# Patient Record
Sex: MALE | Race: WHITE | NOT HISPANIC OR LATINO | Employment: OTHER | URBAN - METROPOLITAN AREA
[De-identification: names, ages, dates, MRNs, and addresses within clinical notes are randomized per-mention and may not be internally consistent; named-entity substitution may affect disease eponyms.]

---

## 2017-02-21 ENCOUNTER — ALLSCRIPTS OFFICE VISIT (OUTPATIENT)
Dept: OTHER | Facility: OTHER | Age: 62
End: 2017-02-21

## 2017-02-21 DIAGNOSIS — E78.5 HYPERLIPIDEMIA: ICD-10-CM

## 2017-02-21 DIAGNOSIS — Z12.5 ENCOUNTER FOR SCREENING FOR MALIGNANT NEOPLASM OF PROSTATE: ICD-10-CM

## 2017-02-21 DIAGNOSIS — I10 ESSENTIAL (PRIMARY) HYPERTENSION: ICD-10-CM

## 2017-02-21 DIAGNOSIS — E55.9 VITAMIN D DEFICIENCY: ICD-10-CM

## 2017-02-23 ENCOUNTER — APPOINTMENT (OUTPATIENT)
Dept: LAB | Facility: CLINIC | Age: 62
End: 2017-02-23
Payer: COMMERCIAL

## 2017-02-23 DIAGNOSIS — I10 ESSENTIAL (PRIMARY) HYPERTENSION: ICD-10-CM

## 2017-02-23 DIAGNOSIS — Z12.5 ENCOUNTER FOR SCREENING FOR MALIGNANT NEOPLASM OF PROSTATE: ICD-10-CM

## 2017-02-23 DIAGNOSIS — E55.9 VITAMIN D DEFICIENCY: ICD-10-CM

## 2017-02-23 DIAGNOSIS — E78.5 HYPERLIPIDEMIA: ICD-10-CM

## 2017-02-23 LAB
25(OH)D3 SERPL-MCNC: 26.4 NG/ML (ref 30–100)
ALBUMIN SERPL BCP-MCNC: 4.1 G/DL (ref 3.5–5)
ALP SERPL-CCNC: 80 U/L (ref 46–116)
ALT SERPL W P-5'-P-CCNC: 56 U/L (ref 12–78)
ANION GAP SERPL CALCULATED.3IONS-SCNC: 8 MMOL/L (ref 4–13)
AST SERPL W P-5'-P-CCNC: 23 U/L (ref 5–45)
BASOPHILS # BLD AUTO: 0.06 THOUSANDS/ΜL (ref 0–0.1)
BASOPHILS NFR BLD AUTO: 1 % (ref 0–1)
BILIRUB SERPL-MCNC: 1.08 MG/DL (ref 0.2–1)
BUN SERPL-MCNC: 14 MG/DL (ref 5–25)
CALCIUM SERPL-MCNC: 8.7 MG/DL (ref 8.3–10.1)
CHLORIDE SERPL-SCNC: 102 MMOL/L (ref 100–108)
CHOLEST SERPL-MCNC: 177 MG/DL (ref 50–200)
CO2 SERPL-SCNC: 29 MMOL/L (ref 21–32)
CREAT SERPL-MCNC: 1.07 MG/DL (ref 0.6–1.3)
EOSINOPHIL # BLD AUTO: 0.38 THOUSAND/ΜL (ref 0–0.61)
EOSINOPHIL NFR BLD AUTO: 5 % (ref 0–6)
ERYTHROCYTE [DISTWIDTH] IN BLOOD BY AUTOMATED COUNT: 12.5 % (ref 11.6–15.1)
GFR SERPL CREATININE-BSD FRML MDRD: >60 ML/MIN/1.73SQ M
GLUCOSE SERPL-MCNC: 131 MG/DL (ref 65–140)
HCT VFR BLD AUTO: 42.8 % (ref 36.5–49.3)
HDLC SERPL-MCNC: 52 MG/DL (ref 40–60)
HGB BLD-MCNC: 14.7 G/DL (ref 12–17)
LDLC SERPL CALC-MCNC: 99 MG/DL (ref 0–100)
LYMPHOCYTES # BLD AUTO: 2.23 THOUSANDS/ΜL (ref 0.6–4.47)
LYMPHOCYTES NFR BLD AUTO: 29 % (ref 14–44)
MCH RBC QN AUTO: 31.3 PG (ref 26.8–34.3)
MCHC RBC AUTO-ENTMCNC: 34.3 G/DL (ref 31.4–37.4)
MCV RBC AUTO: 91 FL (ref 82–98)
MONOCYTES # BLD AUTO: 0.88 THOUSAND/ΜL (ref 0.17–1.22)
MONOCYTES NFR BLD AUTO: 11 % (ref 4–12)
NEUTROPHILS # BLD AUTO: 4.27 THOUSANDS/ΜL (ref 1.85–7.62)
NEUTS SEG NFR BLD AUTO: 54 % (ref 43–75)
NRBC BLD AUTO-RTO: 0 /100 WBCS
PLATELET # BLD AUTO: 208 THOUSANDS/UL (ref 149–390)
PMV BLD AUTO: 12.1 FL (ref 8.9–12.7)
POTASSIUM SERPL-SCNC: 4.4 MMOL/L (ref 3.5–5.3)
PROT SERPL-MCNC: 7.6 G/DL (ref 6.4–8.2)
PSA SERPL-MCNC: 0.6 NG/ML (ref 0–4)
RBC # BLD AUTO: 4.7 MILLION/UL (ref 3.88–5.62)
SODIUM SERPL-SCNC: 139 MMOL/L (ref 136–145)
TRIGL SERPL-MCNC: 131 MG/DL
TSH SERPL DL<=0.05 MIU/L-ACNC: 0.99 UIU/ML (ref 0.36–3.74)
WBC # BLD AUTO: 7.82 THOUSAND/UL (ref 4.31–10.16)

## 2017-02-23 PROCEDURE — 80053 COMPREHEN METABOLIC PANEL: CPT

## 2017-02-23 PROCEDURE — 84443 ASSAY THYROID STIM HORMONE: CPT

## 2017-02-23 PROCEDURE — 85025 COMPLETE CBC W/AUTO DIFF WBC: CPT

## 2017-02-23 PROCEDURE — 36415 COLL VENOUS BLD VENIPUNCTURE: CPT

## 2017-02-23 PROCEDURE — 80061 LIPID PANEL: CPT

## 2017-02-23 PROCEDURE — G0103 PSA SCREENING: HCPCS

## 2017-02-23 PROCEDURE — 82306 VITAMIN D 25 HYDROXY: CPT

## 2017-06-26 ENCOUNTER — ALLSCRIPTS OFFICE VISIT (OUTPATIENT)
Dept: OTHER | Facility: OTHER | Age: 62
End: 2017-06-26

## 2017-10-30 ENCOUNTER — GENERIC CONVERSION - ENCOUNTER (OUTPATIENT)
Dept: OTHER | Facility: OTHER | Age: 62
End: 2017-10-30

## 2018-01-12 VITALS
TEMPERATURE: 97.8 F | BODY MASS INDEX: 28.74 KG/M2 | DIASTOLIC BLOOD PRESSURE: 72 MMHG | RESPIRATION RATE: 16 BRPM | SYSTOLIC BLOOD PRESSURE: 126 MMHG | HEART RATE: 72 BPM | WEIGHT: 183.1 LBS | HEIGHT: 67 IN

## 2018-01-14 VITALS
HEART RATE: 88 BPM | SYSTOLIC BLOOD PRESSURE: 138 MMHG | HEIGHT: 68 IN | RESPIRATION RATE: 16 BRPM | BODY MASS INDEX: 27.74 KG/M2 | DIASTOLIC BLOOD PRESSURE: 80 MMHG | WEIGHT: 183 LBS

## 2018-02-26 ENCOUNTER — TELEPHONE (OUTPATIENT)
Dept: FAMILY MEDICINE CLINIC | Facility: CLINIC | Age: 63
End: 2018-02-26

## 2018-02-26 DIAGNOSIS — I10 BENIGN ESSENTIAL HTN: ICD-10-CM

## 2018-02-26 DIAGNOSIS — Z12.5 SCREENING PSA (PROSTATE SPECIFIC ANTIGEN): ICD-10-CM

## 2018-02-26 DIAGNOSIS — E78.2 HYPERLIPEMIA, MIXED: Primary | ICD-10-CM

## 2018-02-26 RX ORDER — AMLODIPINE BESYLATE 5 MG/1
5 TABLET ORAL EVERY EVENING
Refills: 3 | COMMUNITY
Start: 2017-12-06 | End: 2018-02-26 | Stop reason: SDUPTHER

## 2018-02-26 RX ORDER — AMLODIPINE BESYLATE 5 MG/1
5 TABLET ORAL EVERY EVENING
Qty: 30 TABLET | Refills: 1 | Status: SHIPPED | OUTPATIENT
Start: 2018-02-26 | End: 2018-03-12 | Stop reason: SDUPTHER

## 2018-02-26 NOTE — TELEPHONE ENCOUNTER
Called Orin Gasca and he will  the lab slips  He set an appt for April 2nd as you are on vacation for a bit  Can you send in 1 month refills of the Amodipine and Metoprolol to the [facility] in [place]?

## 2018-02-26 NOTE — TELEPHONE ENCOUNTER
German Washington called and would like to schedule his yearly visit with you  Would you like him to have his BW first or visit first?  If Bw first, please order

## 2018-03-12 DIAGNOSIS — I10 BENIGN ESSENTIAL HTN: ICD-10-CM

## 2018-03-12 RX ORDER — AMLODIPINE BESYLATE 5 MG/1
TABLET ORAL
Qty: 90 TABLET | Refills: 3 | Status: SHIPPED | OUTPATIENT
Start: 2018-03-12 | End: 2019-05-06 | Stop reason: SDUPTHER

## 2018-03-26 ENCOUNTER — APPOINTMENT (OUTPATIENT)
Dept: LAB | Facility: CLINIC | Age: 63
End: 2018-03-26
Payer: COMMERCIAL

## 2018-03-26 LAB
ALBUMIN SERPL BCP-MCNC: 4.4 G/DL (ref 3.5–5)
ALP SERPL-CCNC: 82 U/L (ref 46–116)
ALT SERPL W P-5'-P-CCNC: 50 U/L (ref 12–78)
ANION GAP SERPL CALCULATED.3IONS-SCNC: 6 MMOL/L (ref 4–13)
AST SERPL W P-5'-P-CCNC: 25 U/L (ref 5–45)
BASOPHILS # BLD AUTO: 0.06 THOUSANDS/ΜL (ref 0–0.1)
BASOPHILS NFR BLD AUTO: 1 % (ref 0–1)
BILIRUB SERPL-MCNC: 0.92 MG/DL (ref 0.2–1)
BUN SERPL-MCNC: 18 MG/DL (ref 5–25)
CALCIUM SERPL-MCNC: 9.2 MG/DL (ref 8.3–10.1)
CHLORIDE SERPL-SCNC: 104 MMOL/L (ref 100–108)
CHOLEST SERPL-MCNC: 212 MG/DL (ref 50–200)
CO2 SERPL-SCNC: 28 MMOL/L (ref 21–32)
CREAT SERPL-MCNC: 1.21 MG/DL (ref 0.6–1.3)
EOSINOPHIL # BLD AUTO: 0.33 THOUSAND/ΜL (ref 0–0.61)
EOSINOPHIL NFR BLD AUTO: 5 % (ref 0–6)
ERYTHROCYTE [DISTWIDTH] IN BLOOD BY AUTOMATED COUNT: 13 % (ref 11.6–15.1)
GFR SERPL CREATININE-BSD FRML MDRD: 63 ML/MIN/1.73SQ M
GLUCOSE P FAST SERPL-MCNC: 138 MG/DL (ref 65–99)
HCT VFR BLD AUTO: 44.1 % (ref 36.5–49.3)
HDLC SERPL-MCNC: 49 MG/DL (ref 40–60)
HGB BLD-MCNC: 15.1 G/DL (ref 12–17)
LDLC SERPL CALC-MCNC: 132 MG/DL (ref 0–100)
LYMPHOCYTES # BLD AUTO: 2.35 THOUSANDS/ΜL (ref 0.6–4.47)
LYMPHOCYTES NFR BLD AUTO: 37 % (ref 14–44)
MCH RBC QN AUTO: 30.9 PG (ref 26.8–34.3)
MCHC RBC AUTO-ENTMCNC: 34.2 G/DL (ref 31.4–37.4)
MCV RBC AUTO: 90 FL (ref 82–98)
MONOCYTES # BLD AUTO: 0.57 THOUSAND/ΜL (ref 0.17–1.22)
MONOCYTES NFR BLD AUTO: 9 % (ref 4–12)
NEUTROPHILS # BLD AUTO: 3.01 THOUSANDS/ΜL (ref 1.85–7.62)
NEUTS SEG NFR BLD AUTO: 48 % (ref 43–75)
NRBC BLD AUTO-RTO: 0 /100 WBCS
PLATELET # BLD AUTO: 218 THOUSANDS/UL (ref 149–390)
PMV BLD AUTO: 12.2 FL (ref 8.9–12.7)
POTASSIUM SERPL-SCNC: 4.5 MMOL/L (ref 3.5–5.3)
PROT SERPL-MCNC: 8.3 G/DL (ref 6.4–8.2)
RBC # BLD AUTO: 4.88 MILLION/UL (ref 3.88–5.62)
SODIUM SERPL-SCNC: 138 MMOL/L (ref 136–145)
TRIGL SERPL-MCNC: 157 MG/DL
TSH SERPL DL<=0.05 MIU/L-ACNC: 1.65 UIU/ML (ref 0.36–3.74)
WBC # BLD AUTO: 6.34 THOUSAND/UL (ref 4.31–10.16)

## 2018-03-26 PROCEDURE — 85025 COMPLETE CBC W/AUTO DIFF WBC: CPT | Performed by: NURSE PRACTITIONER

## 2018-03-26 PROCEDURE — 84443 ASSAY THYROID STIM HORMONE: CPT | Performed by: NURSE PRACTITIONER

## 2018-03-26 PROCEDURE — 84153 ASSAY OF PSA TOTAL: CPT | Performed by: NURSE PRACTITIONER

## 2018-03-26 PROCEDURE — 80061 LIPID PANEL: CPT | Performed by: NURSE PRACTITIONER

## 2018-03-26 PROCEDURE — 84154 ASSAY OF PSA FREE: CPT | Performed by: NURSE PRACTITIONER

## 2018-03-26 PROCEDURE — 36415 COLL VENOUS BLD VENIPUNCTURE: CPT | Performed by: NURSE PRACTITIONER

## 2018-03-26 PROCEDURE — 80053 COMPREHEN METABOLIC PANEL: CPT | Performed by: NURSE PRACTITIONER

## 2018-03-27 LAB
PSA FREE MFR SERPL: 38.6 %
PSA FREE SERPL-MCNC: 0.27 NG/ML
PSA SERPL-MCNC: 0.7 NG/ML (ref 0–4)

## 2018-04-02 ENCOUNTER — OFFICE VISIT (OUTPATIENT)
Dept: FAMILY MEDICINE CLINIC | Facility: CLINIC | Age: 63
End: 2018-04-02
Payer: COMMERCIAL

## 2018-04-02 VITALS
DIASTOLIC BLOOD PRESSURE: 80 MMHG | HEART RATE: 88 BPM | SYSTOLIC BLOOD PRESSURE: 138 MMHG | BODY MASS INDEX: 28.41 KG/M2 | HEIGHT: 67 IN | RESPIRATION RATE: 12 BRPM | WEIGHT: 181 LBS

## 2018-04-02 DIAGNOSIS — I10 HYPERTENSION, UNSPECIFIED TYPE: Primary | ICD-10-CM

## 2018-04-02 DIAGNOSIS — E78.5 HYPERLIPIDEMIA, UNSPECIFIED HYPERLIPIDEMIA TYPE: ICD-10-CM

## 2018-04-02 DIAGNOSIS — E55.9 VITAMIN D DEFICIENCY: ICD-10-CM

## 2018-04-02 DIAGNOSIS — L30.9 ECZEMA, UNSPECIFIED TYPE: ICD-10-CM

## 2018-04-02 DIAGNOSIS — R73.9 ELEVATED BLOOD SUGAR: ICD-10-CM

## 2018-04-02 PROBLEM — Z00.00 HEALTHCARE MAINTENANCE: Status: ACTIVE | Noted: 2018-04-02

## 2018-04-02 PROCEDURE — 3008F BODY MASS INDEX DOCD: CPT | Performed by: NURSE PRACTITIONER

## 2018-04-02 PROCEDURE — 1036F TOBACCO NON-USER: CPT | Performed by: NURSE PRACTITIONER

## 2018-04-02 PROCEDURE — 3075F SYST BP GE 130 - 139MM HG: CPT | Performed by: NURSE PRACTITIONER

## 2018-04-02 PROCEDURE — 93000 ELECTROCARDIOGRAM COMPLETE: CPT | Performed by: NURSE PRACTITIONER

## 2018-04-02 PROCEDURE — 3079F DIAST BP 80-89 MM HG: CPT | Performed by: NURSE PRACTITIONER

## 2018-04-02 PROCEDURE — 99214 OFFICE O/P EST MOD 30 MIN: CPT | Performed by: NURSE PRACTITIONER

## 2018-04-02 RX ORDER — AMLODIPINE BESYLATE 5 MG/1
1 TABLET ORAL
COMMUNITY
Start: 2012-02-28 | End: 2018-04-02 | Stop reason: ALTCHOICE

## 2018-04-02 RX ORDER — AMINO ACIDS/MV,IRON,MIN
1 TABLET ORAL DAILY
COMMUNITY

## 2018-04-02 RX ORDER — MOMETASONE FUROATE 1 MG/G
CREAM TOPICAL DAILY
Qty: 45 G | Refills: 0 | Status: SHIPPED | OUTPATIENT
Start: 2018-04-02 | End: 2019-09-19 | Stop reason: SDUPTHER

## 2018-04-02 NOTE — PROGRESS NOTES
Chief Complaint   Patient presents with    Follow-up     Assessment/Plan:      1  Hypertension, unspecified type  This is good with the amlodipine and the metoprolol  Your ekg is normal   - POCT ECG    2  Hyperlipidemia, unspecified hyperlipidemia type  Your chol is good but did go up a bit  Please watch the diet and increase the exercise  3  Vitamin D deficiency  Please increase your Vitd to 2000 a day and wew ill brenda this in 1 year  4  Elevated blood sugar  We will brenda this in 3 months please clean up  the diet and exercise    - HEMOGLOBIN A1C W/ EAG ESTIMATION; Future  - Comprehensive metabolic panel; Future    5  Eczema, unspecified type  - mometasone (ELOCON) 0 1 % cream; Apply topically daily  Dispense: 45 g; Refill: 0   rto       rto 3 months with labs prior  Subjective:      Patient ID: Kaylee Lowe  is a 57 y o  male  Here today to brenda on several chronic issues  Has changed his diet and is eating more meat and more eggs  Has had chronic L shoulder pain and has stopped his cycling due to t he shoulder pain  Is treating this with massage and this seems to have helped  Is walking most days for 40 minutes  No other issues           The following portions of the patient's history were reviewed and updated as appropriate: allergies, current medications, past family history, past medical history, past social history, past surgical history and problem list     Past Medical History:   Diagnosis Date    Adjustment disorder     last assessed - 25Mar2014    Allergic rhinitis 02/06/2012    Cubital tunnel syndrome 04/20/2010    last assessed - 13Apr2012    Dermatitis 10/17/2011    last assessed - 13Apr2012    Electrolyte or fluid disorder 01/24/2011    Fatty liver 92/33/6881    Lichen planus 89/01/0861     Past Surgical History:   Procedure Laterality Date    CYSTOSCOPY W/ URETEROSCOPY W/ LITHOTRIPSY      MOUTH SURGERY      WISDOM TOOTH EXTRACTION       Family History   Problem Relation Age of Onset    Cancer Mother     Diabetes Mother     Arthritis Father     Heart failure Father      Congesrive heart failure    Hypertension Father     Prostate cancer Father     Substance Abuse Neg Hx     Mental illness Neg Hx      Social History   Social History     Social History    Marital status: Single     Spouse name: N/A    Number of children: N/A    Years of education: N/A     Occupational History    Not on file  Social History Main Topics    Smoking status: Never Smoker    Smokeless tobacco: Never Used    Alcohol use Yes      Comment: social alcohol use, drinks 1 glass of wine twice a week    Drug use: No    Sexual activity: Not on file     Other Topics Concern    Not on file     Social History Narrative    Drinks coffee - drinks 2 cups a day    Has  Smoke detectors    Uses safety equipment - seatbelts         Review of Systems   Constitutional: Negative  Respiratory: Negative  Cardiovascular: Negative  Skin: Negative  Neurological: Negative  Hematological: Negative            Vitals:    04/02/18 0855   BP: 138/80   BP Location: Left arm   Patient Position: Sitting   Pulse: 88   Resp: 12   Weight: 82 1 kg (181 lb)   Height: 5' 7" (1 702 m)       Objective:  Office Visit on 04/02/2018   Component Date Value Ref Range Status    OTHER 04/02/2018    Final    04/02/2018   Orders Only on 02/26/2018   Component Date Value Ref Range Status    Cholesterol 03/26/2018 212* 50 - 200 mg/dL Final    Triglycerides 03/26/2018 157* <=150 mg/dL Final    HDL, Direct 03/26/2018 49  40 - 60 mg/dL Final    LDL Calculated 03/26/2018 132* 0 - 100 mg/dL Final    WBC 03/26/2018 6 34  4 31 - 10 16 Thousand/uL Final    RBC 03/26/2018 4 88  3 88 - 5 62 Million/uL Final    Hemoglobin 03/26/2018 15 1  12 0 - 17 0 g/dL Final    Hematocrit 03/26/2018 44 1  36 5 - 49 3 % Final    MCV 03/26/2018 90  82 - 98 fL Final    MCH 03/26/2018 30 9  26 8 - 34 3 pg Final    MCHC 03/26/2018 34 2  31 4 - 37 4 g/dL Final    RDW 03/26/2018 13 0  11 6 - 15 1 % Final    MPV 03/26/2018 12 2  8 9 - 12 7 fL Final    Platelets 86/57/0714 218  149 - 390 Thousands/uL Final    nRBC 03/26/2018 0  /100 WBCs Final    Neutrophils Relative 03/26/2018 48  43 - 75 % Final    Lymphocytes Relative 03/26/2018 37  14 - 44 % Final    Monocytes Relative 03/26/2018 9  4 - 12 % Final    Eosinophils Relative 03/26/2018 5  0 - 6 % Final    Basophils Relative 03/26/2018 1  0 - 1 % Final    Neutrophils Absolute 03/26/2018 3 01  1 85 - 7 62 Thousands/µL Final    Lymphocytes Absolute 03/26/2018 2 35  0 60 - 4 47 Thousands/µL Final    Monocytes Absolute 03/26/2018 0 57  0 17 - 1 22 Thousand/µL Final    Eosinophils Absolute 03/26/2018 0 33  0 00 - 0 61 Thousand/µL Final    Basophils Absolute 03/26/2018 0 06  0 00 - 0 10 Thousands/µL Final    Sodium 03/26/2018 138  136 - 145 mmol/L Final    Potassium 03/26/2018 4 5  3 5 - 5 3 mmol/L Final    Chloride 03/26/2018 104  100 - 108 mmol/L Final    CO2 03/26/2018 28  21 - 32 mmol/L Final    Anion Gap 03/26/2018 6  4 - 13 mmol/L Final    BUN 03/26/2018 18  5 - 25 mg/dL Final    Creatinine 03/26/2018 1 21  0 60 - 1 30 mg/dL Final    Glucose, Fasting 03/26/2018 138* 65 - 99 mg/dL Final    Calcium 03/26/2018 9 2  8 3 - 10 1 mg/dL Final    AST 03/26/2018 25  5 - 45 U/L Final    ALT 03/26/2018 50  12 - 78 U/L Final    Alkaline Phosphatase 03/26/2018 82  46 - 116 U/L Final    Total Protein 03/26/2018 8 3* 6 4 - 8 2 g/dL Final    Albumin 03/26/2018 4 4  3 5 - 5 0 g/dL Final    Total Bilirubin 03/26/2018 0 92  0 20 - 1 00 mg/dL Final    eGFR 03/26/2018 63  ml/min/1 73sq m Final    Prostate Specific Antigen Total 03/26/2018 0 7  0 0 - 4 0 ng/mL Final    PSA, Free 03/26/2018 0 27  N/A ng/mL Final    PSA, Free Pct 03/26/2018 38 6  % Final    [facility] 1 650  0 358 - 3 740 uIU/mL Final   Appointment on 02/23/2017   Component Date Value Ref Range Status    Cholesterol 02/23/2017 177  50 - 200 mg/dL Final    Triglycerides 02/23/2017 131  <=150 mg/dL Final    HDL, Direct 02/23/2017 52  40 - 60 mg/dL Final    LDL Calculated 02/23/2017 99  0 - 100 mg/dL Final    WBC 02/23/2017 7 82  4 31 - 10 16 Thousand/uL Final    RBC 02/23/2017 4 70  3 88 - 5 62 Million/uL Final    Hemoglobin 02/23/2017 14 7  12 0 - 17 0 g/dL Final    Hematocrit 02/23/2017 42 8  36 5 - 49 3 % Final    MCV 02/23/2017 91  82 - 98 fL Final    MCH 02/23/2017 31 3  26 8 - 34 3 pg Final    MCHC 02/23/2017 34 3  31 4 - 37 4 g/dL Final    RDW 02/23/2017 12 5  11 6 - 15 1 % Final    MPV 02/23/2017 12 1  8 9 - 12 7 fL Final    Platelets 51/25/4758 208  149 - 390 Thousands/uL Final    nRBC 02/23/2017 0  /100 WBCs Final    Neutrophils Relative 02/23/2017 54  43 - 75 % Final    Lymphocytes Relative 02/23/2017 29  14 - 44 % Final    Monocytes Relative 02/23/2017 11  4 - 12 % Final    Eosinophils Relative 02/23/2017 5  0 - 6 % Final    Basophils Relative 02/23/2017 1  0 - 1 % Final    Neutrophils Absolute 02/23/2017 4 27  1 85 - 7 62 Thousands/µL Final    Lymphocytes Absolute 02/23/2017 2 23  0 60 - 4 47 Thousands/µL Final    Monocytes Absolute 02/23/2017 0 88  0 17 - 1 22 Thousand/µL Final    Eosinophils Absolute 02/23/2017 0 38  0 00 - 0 61 Thousand/µL Final    Basophils Absolute 02/23/2017 0 06  0 00 - 0 10 Thousands/µL Final    Sodium 02/23/2017 139  136 - 145 mmol/L Final    Potassium 02/23/2017 4 4  3 5 - 5 3 mmol/L Final    Chloride 02/23/2017 102  100 - 108 mmol/L Final    CO2 02/23/2017 29  21 - 32 mmol/L Final    Anion Gap 02/23/2017 8  4 - 13 mmol/L Final    BUN 02/23/2017 14  5 - 25 mg/dL Final    Creatinine 02/23/2017 1 07  0 60 - 1 30 mg/dL Final    Glucose 02/23/2017 131  65 - 140 mg/dL Final    Calcium 02/23/2017 8 7  8 3 - 10 1 mg/dL Final    AST 02/23/2017 23  5 - 45 U/L Final    ALT 02/23/2017 56  12 - 78 U/L Final    Alkaline Phosphatase 02/23/2017 80  46 - 116 U/L Final  Total Protein 02/23/2017 7 6  6 4 - 8 2 g/dL Final    Albumin 02/23/2017 4 1  3 5 - 5 0 g/dL Final    Total Bilirubin 02/23/2017 1 08* 0 20 - 1 00 mg/dL Final    eGFR 02/23/2017 >60 0  ml/min/1 73sq m Final    PSA 02/23/2017 0 6  0 0 - 4 0 ng/mL Final    TSH 3RD GENERATON 02/23/2017 0 992  0 358 - 3 740 uIU/mL Final    Vit D, 25-Hydroxy 02/23/2017 26 4* 30 0 - 100 0 ng/mL Final          Physical Exam   Constitutional: He appears well-developed and well-nourished  Neck: Normal range of motion  Neck supple  Pulmonary/Chest: Effort normal and breath sounds normal    Musculoskeletal: Normal range of motion  Neurological: He is alert  Skin: Skin is warm and dry  Psychiatric: He has a normal mood and affect   His behavior is normal  Judgment and thought content normal

## 2018-06-02 DIAGNOSIS — I10 BENIGN ESSENTIAL HTN: ICD-10-CM

## 2018-06-25 ENCOUNTER — APPOINTMENT (OUTPATIENT)
Dept: LAB | Facility: CLINIC | Age: 63
End: 2018-06-25
Payer: COMMERCIAL

## 2018-06-25 DIAGNOSIS — R73.9 ELEVATED BLOOD SUGAR: ICD-10-CM

## 2018-06-25 LAB
ALBUMIN SERPL BCP-MCNC: 4.4 G/DL (ref 3.5–5)
ALP SERPL-CCNC: 87 U/L (ref 46–116)
ALT SERPL W P-5'-P-CCNC: 39 U/L (ref 12–78)
ANION GAP SERPL CALCULATED.3IONS-SCNC: 7 MMOL/L (ref 4–13)
AST SERPL W P-5'-P-CCNC: 23 U/L (ref 5–45)
BILIRUB SERPL-MCNC: 1.29 MG/DL (ref 0.2–1)
BUN SERPL-MCNC: 20 MG/DL (ref 5–25)
CALCIUM SERPL-MCNC: 9 MG/DL (ref 8.3–10.1)
CHLORIDE SERPL-SCNC: 102 MMOL/L (ref 100–108)
CO2 SERPL-SCNC: 28 MMOL/L (ref 21–32)
CREAT SERPL-MCNC: 1.15 MG/DL (ref 0.6–1.3)
EST. AVERAGE GLUCOSE BLD GHB EST-MCNC: 117 MG/DL
GFR SERPL CREATININE-BSD FRML MDRD: 67 ML/MIN/1.73SQ M
GLUCOSE P FAST SERPL-MCNC: 100 MG/DL (ref 65–99)
HBA1C MFR BLD: 5.7 % (ref 4.2–6.3)
POTASSIUM SERPL-SCNC: 4.4 MMOL/L (ref 3.5–5.3)
PROT SERPL-MCNC: 7.8 G/DL (ref 6.4–8.2)
SODIUM SERPL-SCNC: 137 MMOL/L (ref 136–145)

## 2018-06-25 PROCEDURE — 83036 HEMOGLOBIN GLYCOSYLATED A1C: CPT

## 2018-06-25 PROCEDURE — 80053 COMPREHEN METABOLIC PANEL: CPT

## 2018-06-25 PROCEDURE — 36415 COLL VENOUS BLD VENIPUNCTURE: CPT

## 2018-07-05 ENCOUNTER — TELEPHONE (OUTPATIENT)
Dept: FAMILY MEDICINE CLINIC | Facility: CLINIC | Age: 63
End: 2018-07-05

## 2018-07-05 NOTE — TELEPHONE ENCOUNTER
Called and left message to inform him /[email or web address] labs are improved  Good job! RDBjr      ----- Message from Bernardo Ballesteros Dr sent at 7/2/2018  7:25 AM EDT -----  Call pt: labs are improved  Good job!

## 2018-07-05 NOTE — TELEPHONE ENCOUNTER
----- Message from Bernardo Ballesteros Dr sent at 7/2/2018  7:25 AM EDT -----  Call pt: labs are improved  Good job!

## 2018-07-05 NOTE — TELEPHONE ENCOUNTER
You left message telling the patient that his blood work had improved  He is curious as to what exactly improved, what numbers, etc     You can leave a message with the details      Best number for Noreen Anthony:  [phone]

## 2018-11-03 DIAGNOSIS — I10 BENIGN ESSENTIAL HTN: ICD-10-CM

## 2018-11-05 ENCOUNTER — TELEPHONE (OUTPATIENT)
Dept: FAMILY MEDICINE CLINIC | Facility: CLINIC | Age: 63
End: 2018-11-05

## 2018-11-05 NOTE — TELEPHONE ENCOUNTER
Pt was informed via VM to schedule an office visit      ----- Message from PAU Ballesteros sent at 11/5/2018  8:57 AM EST -----  Call pt and let him know that I got a refill request for her med's and that I did refill but she is due tracy OV   please have him schedule   Thanks

## 2018-11-05 NOTE — TELEPHONE ENCOUNTER
Patient called back  He said he only ever has to come in once a year for a physical and all his medications are renewed for 1 year  He was in for a physical in the spring  He's questioning why he would need to come in for an OV

## 2018-11-13 DIAGNOSIS — I10 BENIGN ESSENTIAL HTN: ICD-10-CM

## 2018-11-13 NOTE — TELEPHONE ENCOUNTER
Patient is booked for a yearly recheck in March, please refill his Metoprolol, as he will be running out in a few days        [facility]      [phone]

## 2019-03-12 ENCOUNTER — OFFICE VISIT (OUTPATIENT)
Dept: FAMILY MEDICINE CLINIC | Facility: CLINIC | Age: 64
End: 2019-03-12
Payer: COMMERCIAL

## 2019-03-12 VITALS
BODY MASS INDEX: 27.62 KG/M2 | WEIGHT: 176 LBS | HEIGHT: 67 IN | RESPIRATION RATE: 12 BRPM | DIASTOLIC BLOOD PRESSURE: 80 MMHG | HEART RATE: 74 BPM | SYSTOLIC BLOOD PRESSURE: 152 MMHG

## 2019-03-12 DIAGNOSIS — Z00.00 MEDICARE ANNUAL WELLNESS VISIT, INITIAL: Primary | ICD-10-CM

## 2019-03-12 DIAGNOSIS — I10 ESSENTIAL HYPERTENSION: ICD-10-CM

## 2019-03-12 DIAGNOSIS — Z23 NEED FOR VACCINATION: ICD-10-CM

## 2019-03-12 DIAGNOSIS — Z12.5 SCREENING PSA (PROSTATE SPECIFIC ANTIGEN): ICD-10-CM

## 2019-03-12 DIAGNOSIS — E78.49 OTHER HYPERLIPIDEMIA: ICD-10-CM

## 2019-03-12 PROCEDURE — G0009 ADMIN PNEUMOCOCCAL VACCINE: HCPCS

## 2019-03-12 PROCEDURE — 1170F FXNL STATUS ASSESSED: CPT | Performed by: NURSE PRACTITIONER

## 2019-03-12 PROCEDURE — 4040F PNEUMOC VAC/ADMIN/RCVD: CPT

## 2019-03-12 PROCEDURE — 90670 PCV13 VACCINE IM: CPT

## 2019-03-12 PROCEDURE — 99214 OFFICE O/P EST MOD 30 MIN: CPT | Performed by: NURSE PRACTITIONER

## 2019-03-12 PROCEDURE — 1125F AMNT PAIN NOTED PAIN PRSNT: CPT | Performed by: NURSE PRACTITIONER

## 2019-03-12 PROCEDURE — G0438 PPPS, INITIAL VISIT: HCPCS | Performed by: NURSE PRACTITIONER

## 2019-03-12 RX ORDER — HYDROCHLOROTHIAZIDE 25 MG/1
12.5 TABLET ORAL DAILY
Qty: 30 TABLET | Refills: 5 | Status: SHIPPED | OUTPATIENT
Start: 2019-03-12 | End: 2019-10-24 | Stop reason: SDUPTHER

## 2019-03-12 NOTE — PROGRESS NOTES
Chief Complaint   Patient presents with    Follow-up    Hypertension    Vitamin D Deficiency    Medicare Wellness Visit     Assessment/Plan:    1  Essential hypertension  This is not in optimal control  We will add the water pill one half in the am  Continue to monitor and we will brenda this in 6 months   - hydrochlorothiazide (HYDRODIURIL) 25 mg tablet; Take 0 5 tablets (12 5 mg total) by mouth daily  Dispense: 30 tablet; Refill: 5    2  Other hyperlipidemia  You have done a really good job with the diet   We will brenda this lipids in 6 months with all your labs  rto 6 months with labs     Subjective:      Patient ID: Sruthi Troy  is a 70 y o  male  Here today to brenda on several chronic issues  States that his BP is often elevated at the dentist as well  Has really cleaned up his diet and is no longer eating sugars  Feels great         The following portions of the patient's history were reviewed and updated as appropriate: allergies, current medications, past family history, past medical history, past social history, past surgical history and problem list     Past Medical History:   Diagnosis Date    Adjustment disorder     last assessed - 25Mar2014    Allergic rhinitis 02/06/2012    Cubital tunnel syndrome 04/20/2010    last assessed - 13Apr2012    Dermatitis 10/17/2011    last assessed - 13Apr2012    Electrolyte or fluid disorder 01/24/2011    Fatty liver 23/92/5583    Lichen planus 48/80/2778     Past Surgical History:   Procedure Laterality Date    CYSTOSCOPY W/ URETEROSCOPY W/ LITHOTRIPSY      MOUTH SURGERY      WISDOM TOOTH EXTRACTION       Family History   Problem Relation Age of Onset    Cancer Mother     Diabetes Mother     Arthritis Father     Heart failure Father         Congesrive heart failure    Hypertension Father     Prostate cancer Father     Substance Abuse Neg Hx     Mental illness Neg Hx      Social History   Social History     Socioeconomic History    Marital status: Single     Spouse name: Not on file    Number of children: Not on file    Years of education: Not on file    Highest education level: Not on file   Occupational History    Not on file   Social Needs    Financial resource strain: Not on file    Food insecurity:     Worry: Not on file     Inability: Not on file    Transportation needs:     Medical: Not on file     Non-medical: Not on file   Tobacco Use    Smoking status: Never Smoker    Smokeless tobacco: Never Used   Substance and Sexual Activity    Alcohol use: Yes     Comment: social alcohol use, drinks 1 glass of wine twice a week    Drug use: No    Sexual activity: Not on file   Lifestyle    Physical activity:     Days per week: Not on file     Minutes per session: Not on file    Stress: Not on file   Relationships    Social connections:     Talks on phone: Not on file     Gets together: Not on file     Attends Taoism service: Not on file     Active member of club or organization: Not on file     Attends meetings of clubs or organizations: Not on file     Relationship status: Not on file    Intimate partner violence:     Fear of current or ex partner: Not on file     Emotionally abused: Not on file     Physically abused: Not on file     Forced sexual activity: Not on file   Other Topics Concern    Not on file   Social History Narrative    Drinks coffee - drinks 2 cups a day    Has  Smoke detectors    Uses safety equipment - seatbelts     Review of Systems   Constitutional: Negative  Respiratory: Negative  Cardiovascular: Negative  Musculoskeletal: Negative  Skin: Negative  Neurological: Negative  Psychiatric/Behavioral: Negative  Vitals:    03/12/19 0826   BP: 152/80   BP Location: Left arm   Patient Position: Sitting   Pulse: 74   Resp: 12   Weight: 79 8 kg (176 lb)   Height: 5' 7 25" (1 708 m)       Objective:   Wt Readings from Last 3 Encounters:   03/12/19 79 8 kg (176 lb)   04/02/18 82 1 kg (181 lb) 06/26/17 83 1 kg (183 lb 1 6 oz)     BP Readings from Last 3 Encounters:   03/12/19 152/80   04/02/18 138/80   06/26/17 126/72     Pulse Readings from Last 3 Encounters:   03/12/19 74   04/02/18 88   06/26/17 72     BMI Readings from Last 3 Encounters:   03/12/19 27 36 kg/m²   04/02/18 28 35 kg/m²   06/26/17 28 68 kg/m²     Appointment on 06/25/2018   Component Date Value Ref Range Status    Hemoglobin A1C 06/25/2018 5 7  4 2 - 6 3 % Final    EAG 06/25/2018 117  mg/dl Final    Sodium 06/25/2018 137  136 - 145 mmol/L Final    Potassium 06/25/2018 4 4  3 5 - 5 3 mmol/L Final    Chloride 06/25/2018 102  100 - 108 mmol/L Final    CO2 06/25/2018 28  21 - 32 mmol/L Final    ANION GAP 06/25/2018 7  4 - 13 mmol/L Final    BUN 06/25/2018 20  5 - 25 mg/dL Final    Creatinine 06/25/2018 1 15  0 60 - 1 30 mg/dL Final    Standardized to IDMS reference method    Glucose, Fasting 06/25/2018 100* 65 - 99 mg/dL Final      Specimen collection should occur prior to Sulfasalazine administration due to the potential for falsely depressed results  Specimen collection should occur prior to Sulfapyridine administration due to the potential for falsely elevated results   Calcium 06/25/2018 9 0  8 3 - 10 1 mg/dL Final    AST 06/25/2018 23  5 - 45 U/L Final      Specimen collection should occur prior to Sulfasalazine administration due to the potential for falsely depressed results   ALT 06/25/2018 39  12 - 78 U/L Final      Specimen collection should occur prior to Sulfasalazine and/or Sulfapyridine administration due to the potential for falsely depressed results       Alkaline Phosphatase 06/25/2018 87  46 - 116 U/L Final    Total Protein 06/25/2018 7 8  6 4 - 8 2 g/dL Final    Albumin 06/25/2018 4 4  3 5 - 5 0 g/dL Final    Total Bilirubin 06/25/2018 1 29* 0 20 - 1 00 mg/dL Final    eGFR 06/25/2018 67  ml/min/1 73sq m Final   Office Visit on 04/02/2018   Component Date Value Ref Range Status    OTHER 04/02/2018 Final    04/02/2018   Orders Only on 02/26/2018   Component Date Value Ref Range Status    Cholesterol 03/26/2018 212* 50 - 200 mg/dL Final      Cholesterol:       Desirable         <200 mg/dl       Borderline         200-239 mg/dl       High              >239           Triglycerides 03/26/2018 157* <=150 mg/dL Final    Specimen collection should occur prior to N-Acetylcysteine or Metamizole administration due to the potential for falsely depressed results   HDL, Direct 03/26/2018 49  40 - 60 mg/dL Final      HDL Cholesterol:       High    >59 mg/dL       Low     <41 mg/dL    LDL Calculated 03/26/2018 132* 0 - 100 mg/dL Final      This screening LDL is a calculated result  It does not have the accuracy of the Direct Measured LDL in the monitoring of patients with hyperlipidemia and/or statin therapy  Direct Measure LDL (XEY574) must be ordered separately in these patients      WBC 03/26/2018 6 34  4 31 - 10 16 Thousand/uL Final    RBC 03/26/2018 4 88  3 88 - 5 62 Million/uL Final    Hemoglobin 03/26/2018 15 1  12 0 - 17 0 g/dL Final    Hematocrit 03/26/2018 44 1  36 5 - 49 3 % Final    MCV 03/26/2018 90  82 - 98 fL Final    MCH 03/26/2018 30 9  26 8 - 34 3 pg Final    MCHC 03/26/2018 34 2  31 4 - 37 4 g/dL Final    RDW 03/26/2018 13 0  11 6 - 15 1 % Final    MPV 03/26/2018 12 2  8 9 - 12 7 fL Final    Platelets 54/57/7936 218  149 - 390 Thousands/uL Final    nRBC 03/26/2018 0  /100 WBCs Final    Neutrophils Relative 03/26/2018 48  43 - 75 % Final    Lymphocytes Relative 03/26/2018 37  14 - 44 % Final    Monocytes Relative 03/26/2018 9  4 - 12 % Final    Eosinophils Relative 03/26/2018 5  0 - 6 % Final    Basophils Relative 03/26/2018 1  0 - 1 % Final    Neutrophils Absolute 03/26/2018 3 01  1 85 - 7 62 Thousands/µL Final    Lymphocytes Absolute 03/26/2018 2 35  0 60 - 4 47 Thousands/µL Final    Monocytes Absolute 03/26/2018 0 57  0 17 - 1 22 Thousand/µL Final    Eosinophils Absolute 03/26/2018 0 33  0 00 - 0 61 Thousand/µL Final    Basophils Absolute 03/26/2018 0 06  0 00 - 0 10 Thousands/µL Final    Sodium 03/26/2018 138  136 - 145 mmol/L Final    Potassium 03/26/2018 4 5  3 5 - 5 3 mmol/L Final    Chloride 03/26/2018 104  100 - 108 mmol/L Final    CO2 03/26/2018 28  21 - 32 mmol/L Final    ANION GAP 03/26/2018 6  4 - 13 mmol/L Final    BUN 03/26/2018 18  5 - 25 mg/dL Final    Creatinine 03/26/2018 1 21  0 60 - 1 30 mg/dL Final    Standardized to IDMS reference method    Glucose, Fasting 03/26/2018 138* 65 - 99 mg/dL Final      Specimen collection should occur prior to Sulfasalazine administration due to the potential for falsely depressed results  Specimen collection should occur prior to Sulfapyridine administration due to the potential for falsely elevated results   Calcium 03/26/2018 9 2  8 3 - 10 1 mg/dL Final    AST 03/26/2018 25  5 - 45 U/L Final      Specimen collection should occur prior to Sulfasalazine administration due to the potential for falsely depressed results   ALT 03/26/2018 50  12 - 78 U/L Final      Specimen collection should occur prior to Sulfasalazine and/or Sulfapyridine administration due to the potential for falsely depressed results   Alkaline Phosphatase 03/26/2018 82  46 - 116 U/L Final    Total Protein 03/26/2018 8 3* 6 4 - 8 2 g/dL Final    Albumin 03/26/2018 4 4  3 5 - 5 0 g/dL Final    Total Bilirubin 03/26/2018 0 92  0 20 - 1 00 mg/dL Final    eGFR 03/26/2018 63  ml/min/1 73sq m Final    Prostate Specific Antigen Total 03/26/2018 0 7  0 0 - 4 0 ng/mL Final    Roche ECLIA methodology  According to the American Urological Association, Serum PSA should  decrease and remain at undetectable levels after radical  prostatectomy  The AUA defines biochemical recurrence as an initial  PSA value 0 2 ng/mL or greater followed by a subsequent confirmatory  PSA value 0 2 ng/mL or greater    Values obtained with different assay methods or kits cannot be used  interchangeably  Results cannot be interpreted as absolute evidence  of the presence or absence of malignant disease   PSA, Free 03/26/2018 0 27  N/A ng/mL Final    Roche ECLIA methodology   PSA, Free Pct 03/26/2018 38 6  % Final    The table below lists the probability of prostate cancer for  men with non-suspicious JACKSON results and total PSA between  4 and 10 ng/mL, by patient age Timothy Rhodes, [facility],  2:1322)  % Free PSA       50-64 yr        65-75 yr                    0 00-10 00%        56%             55%                   10 01-15 00%        24%             35%                   15 01-20 00%        17%             23%                   20 01-25 00%        10%             20%                        >25 00%         5%              9%  Please note:  Jackie et al did not make specific                recommendations regarding the use of                percent free PSA for any other population                of men   [address] 03/26/2018 1 650  0 358 - 3 740 uIU/mL Final          Physical Exam   Constitutional: He is oriented to person, place, and time  He appears well-developed and well-nourished  HENT:   Right Ear: Tympanic membrane and ear canal normal    Left Ear: Tympanic membrane and ear canal normal    Neck: Normal range of motion  Neck supple  Cardiovascular: Normal rate, regular rhythm, normal heart sounds and intact distal pulses  Exam reveals no decreased pulses  Pulmonary/Chest: Effort normal and breath sounds normal  No respiratory distress  He has no wheezes  He has no rales  Musculoskeletal: Normal range of motion  Neurological: He is alert and oriented to person, place, and time  He has normal reflexes  No cranial nerve deficit  Skin: Skin is warm and dry  Psychiatric: He has a normal mood and affect   His behavior is normal  Judgment and thought content normal

## 2019-03-12 NOTE — PROGRESS NOTES
Assessment and Plan:  Medicare wellness conducted  Pt started the pneumonia imm today with [facility] and will get the pneumovax at [facility] in 6 months  Is UTD with zostavax and flu  Is utd on screenings and has advanced directives  Will bring in a copy     Problem List Items Addressed This Visit     None        Health Maintenance Due   Topic Date Due    Hepatitis C Screening  1955   Kareem Philip Medicare Annual Wellness Visit (AWV)  1955    BMI: Followup Plan  08/08/1971    INFLUENZA VACCINE  07/01/2018    Fall Risk  08/08/2018    Pneumococcal PPSV23/PCV13 65+ Years / Low and Medium Risk (1 of 2 - PCV13) 08/08/2018         HPI:  Patient Active Problem List   Diagnosis    Diverticulosis    Hyperlipidemia    Hypertension    Vitamin D deficiency    Healthcare maintenance    Elevated blood sugar     Past Medical History:   Diagnosis Date    Adjustment disorder     last assessed - [id number]    Allergic rhinitis 02/06/2012    Cubital tunnel syndrome 04/20/2010    last assessed - [id number]    Dermatitis 10/17/2011    last assessed - [id number]    Electrolyte or fluid disorder 01/24/2011    Fatty liver 93/28/2380    Lichen planus 74/31/2864     Past Surgical History:   Procedure Laterality Date    CYSTOSCOPY W/ URETEROSCOPY W/ LITHOTRIPSY      MOUTH SURGERY      WISDOM TOOTH EXTRACTION       Family History   Problem Relation Age of Onset    Cancer Mother     Diabetes Mother     Arthritis Father     Heart failure Father         Congesrive heart failure    Hypertension Father     Prostate cancer Father     Substance Abuse Neg Hx     Mental illness Neg Hx      Social History     Tobacco Use   Smoking Status Never Smoker   Smokeless Tobacco Never Used     Social History     Substance and Sexual Activity   Alcohol Use Yes    Comment: social alcohol use, drinks 1 glass of wine twice a week      Social History     Substance and Sexual Activity   Drug Use No         Current Outpatient Medications   Medication Sig Dispense Refill    amLODIPine (NORVASC) 5 mg tablet TAKE 1 TABLET BY MOUTH EVERY EVENING 90 tablet 3    Cholecalciferol (D-400) 400 units TABS Take 4 tablets by mouth daily      metoprolol tartrate (LOPRESSOR) 25 mg tablet Take 1 tablet (25 mg total) by mouth 2 (two) times a day 180 tablet 2    Misc Natural Products (SAW PALMETTO) CAPS Take 3 capsules by mouth daily      mometasone (ELOCON) 0 1 % cream Apply topically daily 45 g 0    Multiple Vitamins-Minerals (OCUVITE EXTRA) TABS Take 1 tablet by mouth daily       No current facility-administered medications for this visit  Allergies   Allergen Reactions    Shellfish Allergy Hives and GI Intolerance    Allopurinol      Other reaction(s): Reverse reaction    Aspirin Hives    Other Allergic Rhinitis, Cough and Edema    Penicillins Hives    Valsartan Edema     Immunization History   Administered Date(s) Administered    INFLUENZA 10/01/2015, 09/21/2016, 10/30/2017, 11/03/2017    Influenza Quadrivalent Preservative Free 3 years and older IM 10/01/2015    Influenza Quadrivalent, 6-35 Months IM 11/03/2017    Influenza TIV (IM) 10/19/2010, 09/06/2012, 09/16/2013, 09/21/2016    Tdap 01/24/2011    Zoster 04/01/2014       Patient Care Team:  Roddy Pandey as PCP - General (Family Medicine)    Medicare Screening Tests and Risk Assessments:  Yue Barahona is here for his Welcome to Medicare and Initial Wellness visit  Health Risk Assessment:  Patient rates overall health as excellent  Patient feels that their physical health rating is Much better  Eyesight was rated as Same  Hearing was rated as Same  Patient feels that their emotional and mental health rating is Same  Pain experienced by patient in the last 7 days has been None  Patient states that he has experienced no weight loss or gain in last 6 months  Emotional/Mental Health:  Patient has been feeling nervous/anxious      PHQ-9 Depression Screening:    Frequency of the following problems over the past two weeks:      1  Little interest or pleasure in doing things: 0 - not at all      2  Feeling down, depressed, or hopeless: 0 - not at all  PHQ-2 Score: 0          Broken Bones/Falls: Fall Risk Assessment:    In the past year, patient has experienced: No history of falling in past year          Bladder/Bowel:  Patient has not leaked urine accidently in the last six months  Patient reports no loss of bowel control  Immunizations:  Patient has had a flu vaccination within the last year  Patient has not received a pneumonia shot  Patient has received a shingles shot  Patient has received tetanus/diphtheria shot  Home Safety:  Patient does not have trouble with stairs inside or outside of their home  Patient currently reports that there are no safety hazards present in home, working smoke alarms, no working carbon monoxide detectors  Preventative Screenings:   prostate cancer screen performed, colon cancer screen completed, cholesterol screen completed, glaucoma eye exam completed,     Nutrition:  Current diet: Regular with servings of the following:    Medications:  Patient is currently taking over-the-counter supplements  Patient is able to manage medications  Lifestyle Choices:  Patient reports no tobacco use  Patient has not smoked or used tobacco in the past   Patient reports alcohol use  Patient drives a vehicle  Patient wears seat belt  Activities of Daily Living:  Can get out of bed by his or her self, able to dress self, able to make own meals, able to do own shopping, able to bathe self, can do own laundry/housekeeping, can manage own money, pay bills and track expenses    Previous Hospitalizations:  No hospitalization or ED visit in past 12 months        Advanced Directives:  Patient has decided on a power of   Patient has spoken to designated power of   Patient has completed advanced directive          Preventative Screening/Counseling: Cardiovascular:      General: Risks and Benefits Discussed and Screening Current          Diabetes:      General: Risks and Benefits Discussed and Screening Current          Colorectal Cancer:      General: Risks and Benefits Discussed and Screening Current          Prostate Cancer:      General: Risks and Benefits Discussed and Screening Current          Osteoporosis:      General: Screening Not Indicated          AAA:      General: Screening Not Indicated          Glaucoma:      General: Screening Current          HIV:      General: Screening Not Indicated          Hepatitis C:      General: Screening Not Indicated        Advanced Directives:   Patient has living will for healthcare, has durable POA for healthcare, patient has an advanced directive  Immunizations:      Influenza: Risks & Benefits Discussed and Influenza UTD This Year      Pneumococcal: Pneumococcal Due Today      Zostavax: Risks & Benefits Discussed and Zostavax Vaccine UTD            No exam data present    Physical Exam:  Review of Systems   Constitutional: Negative  Respiratory: Negative  Cardiovascular: Negative  Gastrointestinal: Negative for bowel incontinence  Musculoskeletal: Negative  Skin: Negative  Neurological: Negative  Psychiatric/Behavioral: Negative  The patient is not nervous/anxious  Vitals:    03/12/19 0826   BP: 152/80   BP Location: Left arm   Patient Position: Sitting   Pulse: 74   Resp: 12   Weight: 79 8 kg (176 lb)   Height: 5' 7 25" (1 708 m)   Body mass index is 27 36 kg/m²  Physical Exam   Constitutional: He is oriented to person, place, and time  He appears well-developed and well-nourished  Neck: Normal range of motion  Neck supple  Cardiovascular: Normal rate, regular rhythm and normal heart sounds  Pulmonary/Chest: Effort normal and breath sounds normal    Abdominal: Soft  Neurological: He is alert and oriented to person, place, and time  Skin: Skin is warm and dry  Psychiatric: He has a normal mood and affect   His behavior is normal  Judgment and thought content normal

## 2019-05-06 DIAGNOSIS — I10 BENIGN ESSENTIAL HTN: ICD-10-CM

## 2019-05-06 RX ORDER — AMLODIPINE BESYLATE 5 MG/1
TABLET ORAL
Qty: 90 TABLET | Refills: 3 | Status: SHIPPED | OUTPATIENT
Start: 2019-05-06 | End: 2020-05-11

## 2019-08-01 DIAGNOSIS — I10 BENIGN ESSENTIAL HTN: ICD-10-CM

## 2019-09-03 ENCOUNTER — TELEPHONE (OUTPATIENT)
Dept: FAMILY MEDICINE CLINIC | Facility: CLINIC | Age: 64
End: 2019-09-03

## 2019-09-12 ENCOUNTER — APPOINTMENT (OUTPATIENT)
Dept: LAB | Facility: CLINIC | Age: 64
End: 2019-09-12
Payer: COMMERCIAL

## 2019-09-12 DIAGNOSIS — Z12.5 SCREENING PSA (PROSTATE SPECIFIC ANTIGEN): ICD-10-CM

## 2019-09-12 DIAGNOSIS — E78.49 OTHER HYPERLIPIDEMIA: ICD-10-CM

## 2019-09-12 DIAGNOSIS — I10 ESSENTIAL HYPERTENSION: ICD-10-CM

## 2019-09-12 LAB
ALBUMIN SERPL BCP-MCNC: 5 G/DL (ref 3.5–5)
ALP SERPL-CCNC: 87 U/L (ref 46–116)
ALT SERPL W P-5'-P-CCNC: 37 U/L (ref 12–78)
ANION GAP SERPL CALCULATED.3IONS-SCNC: 8 MMOL/L (ref 4–13)
AST SERPL W P-5'-P-CCNC: 23 U/L (ref 5–45)
BASOPHILS # BLD AUTO: 0.04 THOUSANDS/ΜL (ref 0–0.1)
BASOPHILS NFR BLD AUTO: 1 % (ref 0–1)
BILIRUB SERPL-MCNC: 1.52 MG/DL (ref 0.2–1)
BUN SERPL-MCNC: 17 MG/DL (ref 5–25)
CALCIUM SERPL-MCNC: 9.2 MG/DL (ref 8.3–10.1)
CHLORIDE SERPL-SCNC: 103 MMOL/L (ref 100–108)
CHOLEST SERPL-MCNC: 197 MG/DL (ref 50–200)
CO2 SERPL-SCNC: 27 MMOL/L (ref 21–32)
CREAT SERPL-MCNC: 1.07 MG/DL (ref 0.6–1.3)
EOSINOPHIL # BLD AUTO: 0.21 THOUSAND/ΜL (ref 0–0.61)
EOSINOPHIL NFR BLD AUTO: 5 % (ref 0–6)
ERYTHROCYTE [DISTWIDTH] IN BLOOD BY AUTOMATED COUNT: 12.4 % (ref 11.6–15.1)
GFR SERPL CREATININE-BSD FRML MDRD: 72 ML/MIN/1.73SQ M
GLUCOSE P FAST SERPL-MCNC: 118 MG/DL (ref 65–99)
HCT VFR BLD AUTO: 44.2 % (ref 36.5–49.3)
HDLC SERPL-MCNC: 50 MG/DL (ref 40–60)
HGB BLD-MCNC: 14.6 G/DL (ref 12–17)
IMM GRANULOCYTES # BLD AUTO: 0.01 THOUSAND/UL (ref 0–0.2)
IMM GRANULOCYTES NFR BLD AUTO: 0 % (ref 0–2)
LDLC SERPL CALC-MCNC: 122 MG/DL (ref 0–100)
LYMPHOCYTES # BLD AUTO: 1.59 THOUSANDS/ΜL (ref 0.6–4.47)
LYMPHOCYTES NFR BLD AUTO: 34 % (ref 14–44)
MCH RBC QN AUTO: 30.5 PG (ref 26.8–34.3)
MCHC RBC AUTO-ENTMCNC: 33 G/DL (ref 31.4–37.4)
MCV RBC AUTO: 92 FL (ref 82–98)
MONOCYTES # BLD AUTO: 0.59 THOUSAND/ΜL (ref 0.17–1.22)
MONOCYTES NFR BLD AUTO: 13 % (ref 4–12)
NEUTROPHILS # BLD AUTO: 2.2 THOUSANDS/ΜL (ref 1.85–7.62)
NEUTS SEG NFR BLD AUTO: 47 % (ref 43–75)
NONHDLC SERPL-MCNC: 147 MG/DL
NRBC BLD AUTO-RTO: 0 /100 WBCS
PLATELET # BLD AUTO: 195 THOUSANDS/UL (ref 149–390)
PMV BLD AUTO: 11.6 FL (ref 8.9–12.7)
POTASSIUM SERPL-SCNC: 4.4 MMOL/L (ref 3.5–5.3)
PROT SERPL-MCNC: 7.9 G/DL (ref 6.4–8.2)
RBC # BLD AUTO: 4.79 MILLION/UL (ref 3.88–5.62)
SODIUM SERPL-SCNC: 138 MMOL/L (ref 136–145)
TRIGL SERPL-MCNC: 124 MG/DL
TSH SERPL DL<=0.05 MIU/L-ACNC: 1.13 UIU/ML (ref 0.36–3.74)
WBC # BLD AUTO: 4.64 THOUSAND/UL (ref 4.31–10.16)

## 2019-09-12 PROCEDURE — 85025 COMPLETE CBC W/AUTO DIFF WBC: CPT

## 2019-09-12 PROCEDURE — 84153 ASSAY OF PSA TOTAL: CPT

## 2019-09-12 PROCEDURE — 80061 LIPID PANEL: CPT

## 2019-09-12 PROCEDURE — 80053 COMPREHEN METABOLIC PANEL: CPT

## 2019-09-12 PROCEDURE — 84443 ASSAY THYROID STIM HORMONE: CPT

## 2019-09-12 PROCEDURE — 36415 COLL VENOUS BLD VENIPUNCTURE: CPT

## 2019-09-12 PROCEDURE — 84154 ASSAY OF PSA FREE: CPT

## 2019-09-13 LAB
PSA FREE MFR SERPL: 40 %
PSA FREE SERPL-MCNC: 0.44 NG/ML
PSA SERPL-MCNC: 1.1 NG/ML (ref 0–4)

## 2019-09-19 ENCOUNTER — OFFICE VISIT (OUTPATIENT)
Dept: FAMILY MEDICINE CLINIC | Facility: CLINIC | Age: 64
End: 2019-09-19
Payer: COMMERCIAL

## 2019-09-19 VITALS
DIASTOLIC BLOOD PRESSURE: 72 MMHG | RESPIRATION RATE: 12 BRPM | SYSTOLIC BLOOD PRESSURE: 138 MMHG | HEIGHT: 63 IN | HEART RATE: 72 BPM | BODY MASS INDEX: 30.48 KG/M2 | WEIGHT: 172 LBS

## 2019-09-19 DIAGNOSIS — E78.49 OTHER HYPERLIPIDEMIA: ICD-10-CM

## 2019-09-19 DIAGNOSIS — L30.9 ECZEMA, UNSPECIFIED TYPE: ICD-10-CM

## 2019-09-19 DIAGNOSIS — Z23 NEED FOR VACCINATION: ICD-10-CM

## 2019-09-19 DIAGNOSIS — I10 ESSENTIAL HYPERTENSION: Primary | ICD-10-CM

## 2019-09-19 PROCEDURE — G0008 ADMIN INFLUENZA VIRUS VAC: HCPCS

## 2019-09-19 PROCEDURE — 90662 IIV NO PRSV INCREASED AG IM: CPT

## 2019-09-19 PROCEDURE — 93000 ELECTROCARDIOGRAM COMPLETE: CPT | Performed by: NURSE PRACTITIONER

## 2019-09-19 PROCEDURE — 99214 OFFICE O/P EST MOD 30 MIN: CPT | Performed by: NURSE PRACTITIONER

## 2019-09-19 RX ORDER — MOMETASONE FUROATE 1 MG/G
CREAM TOPICAL DAILY
Qty: 45 G | Refills: 0 | Status: SHIPPED | OUTPATIENT
Start: 2019-09-19 | End: 2020-06-11 | Stop reason: ALTCHOICE

## 2019-09-19 NOTE — PROGRESS NOTES
Chief Complaint   Patient presents with    Hypertension    Hyperlipidemia    Vitamin D Deficiency     Assessment/Plan:    1  Essential hypertension  This is good with the amlodipine and the metoprolol and the HCTZ     2  Need for vaccination  Flu shot given   - influenza vaccine, 3212-7633, high-dose, PF 0 5 mL (FLUZONE HIGH-DOSE)    3  BMI 30 0-30 9,adult  Please watch your diet and increase exercise as we discussed  Please let us know if there is anythign we can do to help you     4  Eczema, unspecified type  Refill of the mometasone for occasional use  - mometasone (ELOCON) 0 1 % cream; Apply topically daily  Dispense: 45 g; Refill: 0    5  Other hyperlipidemia  This is improved with lifestyle  Continue same and we will brenda in 6 months      rto 6 months to brenda         Subjective:      Patient ID: Gleda Eisenmenger  is a 75 y o  male  Here today  To brenda on several chronic issues  Has been doing a lot of aerobic exercise and has noted that his BP is improved as well as his weight  Is feeling great   Has been watching his diet  Does not check his BP when he is not here        The following portions of the patient's history were reviewed and updated as appropriate: allergies, current medications, past family history, past medical history, past social history, past surgical history and problem list     Past Medical History:   Diagnosis Date    Adjustment disorder     last assessed - 25Mar2014    Allergic rhinitis 02/06/2012    Cubital tunnel syndrome 04/20/2010    last assessed - 13Apr2012    Dermatitis 10/17/2011    last assessed - 13Apr2012    Electrolyte or fluid disorder 01/24/2011    Fatty liver 12/50/7589    Lichen planus 81/06/4825     Past Surgical History:   Procedure Laterality Date    CYSTOSCOPY W/ URETEROSCOPY W/ LITHOTRIPSY      MOUTH SURGERY      WISDOM TOOTH EXTRACTION       Family History   Problem Relation Age of Onset    Cancer Mother     Diabetes Mother     Arthritis Father     Heart failure Father         Congesrive heart failure    Hypertension Father     Prostate cancer Father     Substance Abuse Neg Hx     Mental illness Neg Hx      Social History   Social History     Socioeconomic History    Marital status: Single     Spouse name: Not on file    Number of children: Not on file    Years of education: Not on file    Highest education level: Not on file   Occupational History    Not on file   Social Needs    Financial resource strain: Not on file    Food insecurity:     Worry: Not on file     Inability: Not on file    Transportation needs:     Medical: Not on file     Non-medical: Not on file   Tobacco Use    Smoking status: Never Smoker    Smokeless tobacco: Never Used   Substance and Sexual Activity    Alcohol use: Yes     Comment: social alcohol use, drinks 1 glass of wine twice a week    Drug use: No    Sexual activity: Not on file   Lifestyle    Physical activity:     Days per week: Not on file     Minutes per session: Not on file    Stress: Not on file   Relationships    Social connections:     Talks on phone: Not on file     Gets together: Not on file     Attends Mosque service: Not on file     Active member of club or organization: Not on file     Attends meetings of clubs or organizations: Not on file     Relationship status: Not on file    Intimate partner violence:     Fear of current or ex partner: Not on file     Emotionally abused: Not on file     Physically abused: Not on file     Forced sexual activity: Not on file   Other Topics Concern    Not on file   Social History Narrative    Drinks coffee - drinks 2 cups a day    Has  Smoke detectors    Uses safety equipment - seatbelts     Review of Systems   Constitutional: Negative  Respiratory: Negative  Cardiovascular: Negative  Musculoskeletal: Negative  Skin: Negative  Neurological: Negative  Psychiatric/Behavioral: Negative            Vitals:    09/19/19 0753   BP: 138/72   BP Location: Left arm   Patient Position: Sitting   Pulse: 72   Resp: 12   Weight: 78 kg (172 lb)   Height: 5' 2 5" (1 588 m)       Objective: Wt Readings from Last 3 Encounters:   09/19/19 78 kg (172 lb)   03/12/19 79 8 kg (176 lb)   04/02/18 82 1 kg (181 lb)     BP Readings from Last 3 Encounters:   09/19/19 138/72   03/12/19 152/80   04/02/18 138/80     Pulse Readings from Last 3 Encounters:   09/19/19 72   03/12/19 74   04/02/18 88     BMI Readings from Last 3 Encounters:   09/19/19 30 96 kg/m²   03/12/19 27 36 kg/m²   04/02/18 28 35 kg/m²     Office Visit on 09/19/2019   Component Date Value Ref Range Status    OTHER 09/19/2019    Final    9/19/2019   Appointment on 09/12/2019   Component Date Value Ref Range Status    Cholesterol 09/12/2019 197  50 - 200 mg/dL Final      Cholesterol:       Desirable         <200 mg/dl       Borderline         200-239 mg/dl       High              >239           Triglycerides 09/12/2019 124  <=150 mg/dL Final      Triglyceride:     Normal          <150 mg/dl     Borderline High 150-199 mg/dl     High            200-499 mg/dl        Very High       >499 mg/dl    Specimen collection should occur prior to N-Acetylcysteine or Metamizole administration due to the potential for falsely depressed results   HDL, Direct 09/12/2019 50  40 - 60 mg/dL Final      HDL Cholesterol:       High    >60 mg/dL       Low     <41 mg/dL  Specimen collection should occur prior to Metamizole administration due to the potential for falsley depressed results   LDL Calculated 09/12/2019 122* 0 - 100 mg/dL Final      LDL Cholesterol:     Optimal           <100 mg/dl     Near Optimal      100-129 mg/dl     Above Optimal       Borderline High 130-159 mg/dl       High            160-189 mg/dl       Very High       >189 mg/dl         This screening LDL is a calculated result     It does not have the accuracy of the Direct Measured LDL in the monitoring of patients with hyperlipidemia and/or statin therapy  Direct Measure LDL (JBI933) must be ordered separately in these patients      Non-HDL-Chol (CHOL-HDL) 09/12/2019 147  mg/dl Final    WBC 09/12/2019 4 64  4 31 - 10 16 Thousand/uL Final    RBC 09/12/2019 4 79  3 88 - 5 62 Million/uL Final    Hemoglobin 09/12/2019 14 6  12 0 - 17 0 g/dL Final    Hematocrit 09/12/2019 44 2  36 5 - 49 3 % Final    MCV 09/12/2019 92  82 - 98 fL Final    MCH 09/12/2019 30 5  26 8 - 34 3 pg Final    MCHC 09/12/2019 33 0  31 4 - 37 4 g/dL Final    RDW 09/12/2019 12 4  11 6 - 15 1 % Final    MPV 09/12/2019 11 6  8 9 - 12 7 fL Final    Platelets 56/30/2310 195  149 - 390 Thousands/uL Final    nRBC 09/12/2019 0  /100 WBCs Final    Neutrophils Relative 09/12/2019 47  43 - 75 % Final    Immat GRANS % 09/12/2019 0  0 - 2 % Final    Lymphocytes Relative 09/12/2019 34  14 - 44 % Final    Monocytes Relative 09/12/2019 13* 4 - 12 % Final    Eosinophils Relative 09/12/2019 5  0 - 6 % Final    Basophils Relative 09/12/2019 1  0 - 1 % Final    Neutrophils Absolute 09/12/2019 2 20  1 85 - 7 62 Thousands/µL Final    Immature Grans Absolute 09/12/2019 0 01  0 00 - 0 20 Thousand/uL Final    Lymphocytes Absolute 09/12/2019 1 59  0 60 - 4 47 Thousands/µL Final    Monocytes Absolute 09/12/2019 0 59  0 17 - 1 22 Thousand/µL Final    Eosinophils Absolute 09/12/2019 0 21  0 00 - 0 61 Thousand/µL Final    Basophils Absolute 09/12/2019 0 04  0 00 - 0 10 Thousands/µL Final    Sodium 09/12/2019 138  136 - 145 mmol/L Final    Potassium 09/12/2019 4 4  3 5 - 5 3 mmol/L Final    Chloride 09/12/2019 103  100 - 108 mmol/L Final    CO2 09/12/2019 27  21 - 32 mmol/L Final    ANION GAP 09/12/2019 8  4 - 13 mmol/L Final    BUN 09/12/2019 17  5 - 25 mg/dL Final    Creatinine 09/12/2019 1 07  0 60 - 1 30 mg/dL Final    Standardized to IDMS reference method    Glucose, Fasting 09/12/2019 118* 65 - 99 mg/dL Final      Specimen collection should occur prior to Sulfasalazine administration due to the potential for falsely depressed results  Specimen collection should occur prior to Sulfapyridine administration due to the potential for falsely elevated results   Calcium 09/12/2019 9 2  8 3 - 10 1 mg/dL Final    AST 09/12/2019 23  5 - 45 U/L Final      Specimen collection should occur prior to Sulfasalazine administration due to the potential for falsely depressed results   ALT 09/12/2019 37  12 - 78 U/L Final      Specimen collection should occur prior to Sulfasalazine and/or Sulfapyridine administration due to the potential for falsely depressed results   Alkaline Phosphatase 09/12/2019 87  46 - 116 U/L Final    Total Protein 09/12/2019 7 9  6 4 - 8 2 g/dL Final    Albumin 09/12/2019 5 0  3 5 - 5 0 g/dL Final    Total Bilirubin 09/12/2019 1 52* 0 20 - 1 00 mg/dL Final    eGFR 09/12/2019 72  ml/min/1 73sq m Final    TSH 3RD GENERATON 09/12/2019 1 130  0 358 - 3 740 uIU/mL Final      Using supplements with high doses of biotin 20 to more than 300 times greater than the adequate daily intake for adults of 30 mcg/day as established by the [place] [employer], can cause falsely depress results   Prostate Specific Antigen Total 09/12/2019 1 1  0 0 - 4 0 ng/mL Final    Roche ECLIA methodology  According to the American Urological Association, Serum PSA should  decrease and remain at undetectable levels after radical  prostatectomy  The AUA defines biochemical recurrence as an initial  PSA value 0 2 ng/mL or greater followed by a subsequent confirmatory  PSA value 0 2 ng/mL or greater  Values obtained with different assay methods or kits cannot be used  interchangeably  Results cannot be interpreted as absolute evidence  of the presence or absence of malignant disease   PSA, Free 09/12/2019 0 44  N/A ng/mL Final    Roche ECLIA methodology      PSA, Free Pct 09/12/2019 40 0  % Final    The table below lists the probability of prostate cancer for  men with non-suspicious JACKSON results and total PSA between  4 and 10 ng/mL, by patient age Zachary Major, [address] [facility],  [phone])  % Free PSA       50-64 yr        65-75 yr                    0 00-10 00%        56%             55%                   10 01-15 00%        24%             35%                   15 01-20 00%        17%             23%                   20 01-25 00%        10%             20%                        >25 00%         5%              9%  Please note:  Jackie et al did not make specific                recommendations regarding the use of                percent free PSA for any other population                of men  Appointment on 06/25/2018   Component Date Value Ref Range Status    Hemoglobin A1C 06/25/2018 5 7  4 2 - 6 3 % Final    EAG 06/25/2018 117  mg/dl Final    Sodium 06/25/2018 137  136 - 145 mmol/L Final    Potassium 06/25/2018 4 4  3 5 - 5 3 mmol/L Final    Chloride 06/25/2018 102  100 - 108 mmol/L Final    CO2 06/25/2018 28  21 - 32 mmol/L Final    ANION GAP 06/25/2018 7  4 - 13 mmol/L Final    BUN 06/25/2018 20  5 - 25 mg/dL Final    Creatinine 06/25/2018 1 15  0 60 - 1 30 mg/dL Final    Standardized to IDMS reference method    Glucose, Fasting 06/25/2018 100* 65 - 99 mg/dL Final      Specimen collection should occur prior to Sulfasalazine administration due to the potential for falsely depressed results  Specimen collection should occur prior to Sulfapyridine administration due to the potential for falsely elevated results   Calcium 06/25/2018 9 0  8 3 - 10 1 mg/dL Final    AST 06/25/2018 23  5 - 45 U/L Final      Specimen collection should occur prior to Sulfasalazine administration due to the potential for falsely depressed results   ALT 06/25/2018 39  12 - 78 U/L Final      Specimen collection should occur prior to Sulfasalazine and/or Sulfapyridine administration due to the potential for falsely depressed results       Alkaline Phosphatase 06/25/2018 87  46 - 116 U/L Final    Total Protein 06/25/2018 7 8  6 4 - 8 2 g/dL Final    Albumin 06/25/2018 4 4  3 5 - 5 0 g/dL Final    Total Bilirubin 06/25/2018 1 29* 0 20 - 1 00 mg/dL Final    eGFR 06/25/2018 67  ml/min/1 73sq m Final   Office Visit on 04/02/2018   Component Date Value Ref Range Status    OTHER 04/02/2018    Final    04/02/2018   Orders Only on 02/26/2018   Component Date Value Ref Range Status    Cholesterol 03/26/2018 212* 50 - 200 mg/dL Final      Cholesterol:       Desirable         <200 mg/dl       Borderline         200-239 mg/dl       High              >239           Triglycerides 03/26/2018 157* <=150 mg/dL Final    Specimen collection should occur prior to N-Acetylcysteine or Metamizole administration due to the potential for falsely depressed results   HDL, Direct 03/26/2018 49  40 - 60 mg/dL Final      HDL Cholesterol:       High    >59 mg/dL       Low     <41 mg/dL    LDL Calculated 03/26/2018 132* 0 - 100 mg/dL Final      This screening LDL is a calculated result  It does not have the accuracy of the Direct Measured LDL in the monitoring of patients with hyperlipidemia and/or statin therapy  Direct Measure LDL ([id number]) must be ordered separately in these patients      WBC 03/26/2018 6 34  4 31 - 10 16 Thousand/uL Final    RBC 03/26/2018 4 88  3 88 - 5 62 Million/uL Final    Hemoglobin 03/26/2018 15 1  12 0 - 17 0 g/dL Final    Hematocrit 03/26/2018 44 1  36 5 - 49 3 % Final    MCV 03/26/2018 90  82 - 98 fL Final    MCH 03/26/2018 30 9  26 8 - 34 3 pg Final    MCHC 03/26/2018 34 2  31 4 - 37 4 g/dL Final    RDW 03/26/2018 13 0  11 6 - 15 1 % Final    MPV 03/26/2018 12 2  8 9 - 12 7 fL Final    Platelets 04/54/2651 218  149 - 390 Thousands/uL Final    nRBC 03/26/2018 0  /100 WBCs Final    Neutrophils Relative 03/26/2018 48  43 - 75 % Final    Lymphocytes Relative 03/26/2018 37  14 - 44 % Final    Monocytes Relative 03/26/2018 9  4 - 12 % Final    Eosinophils Relative 03/26/2018 5  0 - 6 % Final    Basophils Relative 03/26/2018 1  0 - 1 % Final    Neutrophils Absolute 03/26/2018 3 01  1 85 - 7 62 Thousands/µL Final    Lymphocytes Absolute 03/26/2018 2 35  0 60 - 4 47 Thousands/µL Final    Monocytes Absolute 03/26/2018 0 57  0 17 - 1 22 Thousand/µL Final    Eosinophils Absolute 03/26/2018 0 33  0 00 - 0 61 Thousand/µL Final    Basophils Absolute 03/26/2018 0 06  0 00 - 0 10 Thousands/µL Final    Sodium 03/26/2018 138  136 - 145 mmol/L Final    Potassium 03/26/2018 4 5  3 5 - 5 3 mmol/L Final    Chloride 03/26/2018 104  100 - 108 mmol/L Final    CO2 03/26/2018 28  21 - 32 mmol/L Final    ANION GAP 03/26/2018 6  4 - 13 mmol/L Final    BUN 03/26/2018 18  5 - 25 mg/dL Final    Creatinine 03/26/2018 1 21  0 60 - 1 30 mg/dL Final    Standardized to IDMS reference method    Glucose, Fasting 03/26/2018 138* 65 - 99 mg/dL Final      Specimen collection should occur prior to Sulfasalazine administration due to the potential for falsely depressed results  Specimen collection should occur prior to Sulfapyridine administration due to the potential for falsely elevated results   Calcium 03/26/2018 9 2  8 3 - 10 1 mg/dL Final    AST 03/26/2018 25  5 - 45 U/L Final      Specimen collection should occur prior to Sulfasalazine administration due to the potential for falsely depressed results   ALT 03/26/2018 50  12 - 78 U/L Final      Specimen collection should occur prior to Sulfasalazine and/or Sulfapyridine administration due to the potential for falsely depressed results   Alkaline Phosphatase 03/26/2018 82  46 - 116 U/L Final    Total Protein 03/26/2018 8 3* 6 4 - 8 2 g/dL Final    Albumin 03/26/2018 4 4  3 5 - 5 0 g/dL Final    Total Bilirubin 03/26/2018 0 92  0 20 - 1 00 mg/dL Final    eGFR 03/26/2018 63  ml/min/1 73sq m Final    Prostate Specific Antigen Total 03/26/2018 0 7  0 0 - 4 0 ng/mL Final    Roche ECLIA methodology    According to the American Urological Association, Serum PSA should  decrease and remain at undetectable levels after radical  prostatectomy  The AUA defines biochemical recurrence as an initial  PSA value 0 2 ng/mL or greater followed by a subsequent confirmatory  PSA value 0 2 ng/mL or greater  Values obtained with different assay methods or kits cannot be used  interchangeably  Results cannot be interpreted as absolute evidence  of the presence or absence of malignant disease   PSA, Free 03/26/2018 0 27  N/A ng/mL Final    Roche ECLIA methodology   PSA, Free Pct 03/26/2018 38 6  % Final    The table below lists the probability of prostate cancer for  men with non-suspicious JACKSON results and total PSA between  4 and 10 ng/mL, by patient age Ousmane Eli, [address] [place] [address],  [phone])  % Free PSA       50-64 yr        65-75 yr                    0 00-10 00%        56%             55%                   10 01-15 00%        24%             35%                   15 01-20 00%        17%             23%                   20 01-25 00%        10%             20%                        >25 00%         5%              9%  Please note:  Jackie et al did not make specific                recommendations regarding the use of                percent free PSA for any other population                of men   [address] 03/26/2018 1 650  0 358 - 3 740 uIU/mL Final          Physical Exam   Constitutional: He is oriented to person, place, and time  He appears well-developed and well-nourished  HENT:   Right Ear: Tympanic membrane and ear canal normal    Left Ear: Tympanic membrane and ear canal normal    Neck: Normal range of motion  Neck supple  Cardiovascular: Normal rate, regular rhythm, normal heart sounds and intact distal pulses  Exam reveals no decreased pulses  Pulmonary/Chest: Effort normal and breath sounds normal  No respiratory distress  He has no wheezes  He has no rales     Musculoskeletal: Normal range of motion  Neurological: He is alert and oriented to person, place, and time  He has normal reflexes  No cranial nerve deficit  Skin: Skin is warm and dry  Psychiatric: He has a normal mood and affect  His behavior is normal  Judgment and thought content normal        BMI Counseling: Body mass index is 30 96 kg/m²  The BMI is above normal  Nutrition recommendations include reducing portion sizes, decreasing overall calorie intake and 3-5 servings of fruits/vegetables daily  Exercise recommendations include moderate aerobic physical activity for 150 minutes/week

## 2019-10-24 DIAGNOSIS — I10 ESSENTIAL HYPERTENSION: ICD-10-CM

## 2019-10-24 RX ORDER — HYDROCHLOROTHIAZIDE 12.5 MG/1
12.5 TABLET ORAL DAILY
Qty: 90 TABLET | Refills: 3 | Status: SHIPPED | OUTPATIENT
Start: 2019-10-24 | End: 2020-12-21

## 2019-10-24 RX ORDER — HYDROCHLOROTHIAZIDE 25 MG/1
TABLET ORAL
Qty: 45 TABLET | Refills: 3 | Status: SHIPPED | OUTPATIENT
Start: 2019-10-24 | End: 2019-10-24 | Stop reason: SDUPTHER

## 2019-12-27 ENCOUNTER — TELEPHONE (OUTPATIENT)
Dept: GASTROENTEROLOGY | Facility: CLINIC | Age: 64
End: 2019-12-27

## 2020-02-12 NOTE — TELEPHONE ENCOUNTER
Dr Jackson Seen has been contacted to schedule recall colon with no response-please advise   Thank you

## 2020-02-13 NOTE — TELEPHONE ENCOUNTER
Patient had difficulty with the colonoscopy in 2015 - had fair prep in 2015 and had some wretching during exam - recall was for 5 years given the limitations of the prep - have patient get a colo- gurard study - we can order - if he has questions then let me know and I can call him thanks - can use this for sceeening and repeat in 3 years

## 2020-03-16 ENCOUNTER — APPOINTMENT (OUTPATIENT)
Dept: LAB | Facility: CLINIC | Age: 65
End: 2020-03-16
Payer: COMMERCIAL

## 2020-03-16 DIAGNOSIS — I10 ESSENTIAL HYPERTENSION: ICD-10-CM

## 2020-03-16 LAB
ALBUMIN SERPL BCP-MCNC: 4.3 G/DL (ref 3.5–5)
ALP SERPL-CCNC: 92 U/L (ref 46–116)
ALT SERPL W P-5'-P-CCNC: 37 U/L (ref 12–78)
ANION GAP SERPL CALCULATED.3IONS-SCNC: 6 MMOL/L (ref 4–13)
AST SERPL W P-5'-P-CCNC: 22 U/L (ref 5–45)
BILIRUB SERPL-MCNC: 1 MG/DL (ref 0.2–1)
BUN SERPL-MCNC: 16 MG/DL (ref 5–25)
CALCIUM SERPL-MCNC: 9.1 MG/DL (ref 8.3–10.1)
CHLORIDE SERPL-SCNC: 105 MMOL/L (ref 100–108)
CO2 SERPL-SCNC: 28 MMOL/L (ref 21–32)
CREAT SERPL-MCNC: 1.01 MG/DL (ref 0.6–1.3)
GFR SERPL CREATININE-BSD FRML MDRD: 77 ML/MIN/1.73SQ M
GLUCOSE P FAST SERPL-MCNC: 130 MG/DL (ref 65–99)
POTASSIUM SERPL-SCNC: 4.1 MMOL/L (ref 3.5–5.3)
PROT SERPL-MCNC: 8 G/DL (ref 6.4–8.2)
SODIUM SERPL-SCNC: 139 MMOL/L (ref 136–145)

## 2020-03-16 PROCEDURE — 36415 COLL VENOUS BLD VENIPUNCTURE: CPT

## 2020-03-16 PROCEDURE — 80053 COMPREHEN METABOLIC PANEL: CPT

## 2020-04-20 ENCOUNTER — TELEPHONE (OUTPATIENT)
Dept: FAMILY MEDICINE CLINIC | Facility: CLINIC | Age: 65
End: 2020-04-20

## 2020-04-20 ENCOUNTER — TELEMEDICINE (OUTPATIENT)
Dept: FAMILY MEDICINE CLINIC | Facility: CLINIC | Age: 65
End: 2020-04-20
Payer: COMMERCIAL

## 2020-04-20 VITALS — HEIGHT: 63 IN | WEIGHT: 173.2 LBS | BODY MASS INDEX: 30.69 KG/M2 | TEMPERATURE: 96.7 F

## 2020-04-20 DIAGNOSIS — R73.9 ELEVATED BLOOD SUGAR: ICD-10-CM

## 2020-04-20 DIAGNOSIS — R22.0 LIP SWELLING: ICD-10-CM

## 2020-04-20 DIAGNOSIS — I10 ESSENTIAL HYPERTENSION: ICD-10-CM

## 2020-04-20 DIAGNOSIS — E78.49 OTHER HYPERLIPIDEMIA: ICD-10-CM

## 2020-04-20 DIAGNOSIS — Z12.11 ENCOUNTER FOR SCREENING COLONOSCOPY: Primary | ICD-10-CM

## 2020-04-20 PROCEDURE — G2012 BRIEF CHECK IN BY MD/QHP: HCPCS | Performed by: NURSE PRACTITIONER

## 2020-05-09 DIAGNOSIS — I10 BENIGN ESSENTIAL HTN: ICD-10-CM

## 2020-05-11 RX ORDER — AMLODIPINE BESYLATE 5 MG/1
TABLET ORAL
Qty: 90 TABLET | Refills: 3 | Status: SHIPPED | OUTPATIENT
Start: 2020-05-11 | End: 2020-08-13

## 2020-06-03 ENCOUNTER — APPOINTMENT (OUTPATIENT)
Dept: LAB | Facility: HOSPITAL | Age: 65
End: 2020-06-03
Payer: COMMERCIAL

## 2020-06-03 DIAGNOSIS — R73.9 ELEVATED BLOOD SUGAR: ICD-10-CM

## 2020-06-03 DIAGNOSIS — Z12.11 ENCOUNTER FOR SCREENING COLONOSCOPY: ICD-10-CM

## 2020-06-03 DIAGNOSIS — I10 ESSENTIAL HYPERTENSION: ICD-10-CM

## 2020-06-03 DIAGNOSIS — E78.49 OTHER HYPERLIPIDEMIA: ICD-10-CM

## 2020-06-03 DIAGNOSIS — R22.0 LIP SWELLING: ICD-10-CM

## 2020-06-03 LAB
ALBUMIN SERPL BCP-MCNC: 4.4 G/DL (ref 3.5–5)
ALP SERPL-CCNC: 87 U/L (ref 46–116)
ALT SERPL W P-5'-P-CCNC: 44 U/L (ref 12–78)
ANION GAP SERPL CALCULATED.3IONS-SCNC: 4 MMOL/L (ref 4–13)
AST SERPL W P-5'-P-CCNC: 26 U/L (ref 5–45)
BASOPHILS # BLD AUTO: 0.06 THOUSANDS/ΜL (ref 0–0.1)
BASOPHILS NFR BLD AUTO: 1 % (ref 0–1)
BILIRUB SERPL-MCNC: 1.19 MG/DL (ref 0.2–1)
BUN SERPL-MCNC: 19 MG/DL (ref 5–25)
CALCIUM SERPL-MCNC: 9 MG/DL (ref 8.3–10.1)
CHLORIDE SERPL-SCNC: 102 MMOL/L (ref 100–108)
CHOLEST SERPL-MCNC: 198 MG/DL (ref 50–200)
CO2 SERPL-SCNC: 30 MMOL/L (ref 21–32)
CREAT SERPL-MCNC: 1.04 MG/DL (ref 0.6–1.3)
EOSINOPHIL # BLD AUTO: 0.01 THOUSAND/ΜL (ref 0–0.61)
EOSINOPHIL NFR BLD AUTO: 0 % (ref 0–6)
ERYTHROCYTE [DISTWIDTH] IN BLOOD BY AUTOMATED COUNT: 12.4 % (ref 11.6–15.1)
EST. AVERAGE GLUCOSE BLD GHB EST-MCNC: 123 MG/DL
GFR SERPL CREATININE-BSD FRML MDRD: 74 ML/MIN/1.73SQ M
GLUCOSE P FAST SERPL-MCNC: 112 MG/DL (ref 65–99)
HBA1C MFR BLD: 5.9 %
HCT VFR BLD AUTO: 42.4 % (ref 36.5–49.3)
HDLC SERPL-MCNC: 51 MG/DL
HGB BLD-MCNC: 14.4 G/DL (ref 12–17)
IMM GRANULOCYTES # BLD AUTO: 0.01 THOUSAND/UL (ref 0–0.2)
IMM GRANULOCYTES NFR BLD AUTO: 0 % (ref 0–2)
LDLC SERPL CALC-MCNC: 120 MG/DL (ref 0–100)
LYMPHOCYTES # BLD AUTO: 1.47 THOUSANDS/ΜL (ref 0.6–4.47)
LYMPHOCYTES NFR BLD AUTO: 27 % (ref 14–44)
MCH RBC QN AUTO: 31.6 PG (ref 26.8–34.3)
MCHC RBC AUTO-ENTMCNC: 34 G/DL (ref 31.4–37.4)
MCV RBC AUTO: 93 FL (ref 82–98)
MONOCYTES # BLD AUTO: 0.67 THOUSAND/ΜL (ref 0.17–1.22)
MONOCYTES NFR BLD AUTO: 12 % (ref 4–12)
NEUTROPHILS # BLD AUTO: 3.3 THOUSANDS/ΜL (ref 1.85–7.62)
NEUTS SEG NFR BLD AUTO: 60 % (ref 43–75)
NONHDLC SERPL-MCNC: 147 MG/DL
NRBC BLD AUTO-RTO: 0 /100 WBCS
PLATELET # BLD AUTO: 180 THOUSANDS/UL (ref 149–390)
PMV BLD AUTO: 11.8 FL (ref 8.9–12.7)
POTASSIUM SERPL-SCNC: 4 MMOL/L (ref 3.5–5.3)
PROT SERPL-MCNC: 8 G/DL (ref 6.4–8.2)
RBC # BLD AUTO: 4.55 MILLION/UL (ref 3.88–5.62)
SODIUM SERPL-SCNC: 136 MMOL/L (ref 136–145)
TRIGL SERPL-MCNC: 137 MG/DL
WBC # BLD AUTO: 5.52 THOUSAND/UL (ref 4.31–10.16)

## 2020-06-03 PROCEDURE — 85025 COMPLETE CBC W/AUTO DIFF WBC: CPT

## 2020-06-03 PROCEDURE — 80061 LIPID PANEL: CPT

## 2020-06-03 PROCEDURE — 83036 HEMOGLOBIN GLYCOSYLATED A1C: CPT

## 2020-06-03 PROCEDURE — 36415 COLL VENOUS BLD VENIPUNCTURE: CPT

## 2020-06-03 PROCEDURE — 80053 COMPREHEN METABOLIC PANEL: CPT

## 2020-06-11 ENCOUNTER — OFFICE VISIT (OUTPATIENT)
Dept: FAMILY MEDICINE CLINIC | Facility: CLINIC | Age: 65
End: 2020-06-11
Payer: COMMERCIAL

## 2020-06-11 VITALS
WEIGHT: 173 LBS | RESPIRATION RATE: 12 BRPM | HEIGHT: 62 IN | TEMPERATURE: 98.2 F | HEART RATE: 88 BPM | BODY MASS INDEX: 31.83 KG/M2 | SYSTOLIC BLOOD PRESSURE: 130 MMHG | DIASTOLIC BLOOD PRESSURE: 80 MMHG

## 2020-06-11 DIAGNOSIS — M54.42 ACUTE LEFT-SIDED LOW BACK PAIN WITH LEFT-SIDED SCIATICA: ICD-10-CM

## 2020-06-11 DIAGNOSIS — R73.03 PREDIABETES: ICD-10-CM

## 2020-06-11 DIAGNOSIS — T78.3XXD ANGIOEDEMA, SUBSEQUENT ENCOUNTER: ICD-10-CM

## 2020-06-11 DIAGNOSIS — Z00.00 MEDICARE ANNUAL WELLNESS VISIT, SUBSEQUENT: Primary | ICD-10-CM

## 2020-06-11 DIAGNOSIS — I10 ESSENTIAL HYPERTENSION: ICD-10-CM

## 2020-06-11 DIAGNOSIS — Z23 NEED FOR VACCINATION: ICD-10-CM

## 2020-06-11 DIAGNOSIS — E78.49 OTHER HYPERLIPIDEMIA: ICD-10-CM

## 2020-06-11 PROCEDURE — 90732 PPSV23 VACC 2 YRS+ SUBQ/IM: CPT

## 2020-06-11 PROCEDURE — 4040F PNEUMOC VAC/ADMIN/RCVD: CPT | Performed by: NURSE PRACTITIONER

## 2020-06-11 PROCEDURE — G0009 ADMIN PNEUMOCOCCAL VACCINE: HCPCS

## 2020-06-11 PROCEDURE — 1125F AMNT PAIN NOTED PAIN PRSNT: CPT | Performed by: NURSE PRACTITIONER

## 2020-06-11 PROCEDURE — 99214 OFFICE O/P EST MOD 30 MIN: CPT | Performed by: NURSE PRACTITIONER

## 2020-06-11 PROCEDURE — 1160F RVW MEDS BY RX/DR IN RCRD: CPT | Performed by: NURSE PRACTITIONER

## 2020-06-11 PROCEDURE — 3075F SYST BP GE 130 - 139MM HG: CPT | Performed by: NURSE PRACTITIONER

## 2020-06-11 PROCEDURE — G0439 PPPS, SUBSEQ VISIT: HCPCS | Performed by: NURSE PRACTITIONER

## 2020-06-11 PROCEDURE — 1036F TOBACCO NON-USER: CPT | Performed by: NURSE PRACTITIONER

## 2020-06-11 PROCEDURE — 3079F DIAST BP 80-89 MM HG: CPT | Performed by: NURSE PRACTITIONER

## 2020-06-11 PROCEDURE — 3008F BODY MASS INDEX DOCD: CPT | Performed by: NURSE PRACTITIONER

## 2020-06-11 PROCEDURE — 1170F FXNL STATUS ASSESSED: CPT | Performed by: NURSE PRACTITIONER

## 2020-06-12 ENCOUNTER — EVALUATION (OUTPATIENT)
Dept: PHYSICAL THERAPY | Facility: CLINIC | Age: 65
End: 2020-06-12
Payer: COMMERCIAL

## 2020-06-12 DIAGNOSIS — M54.42 ACUTE LEFT-SIDED LOW BACK PAIN WITH LEFT-SIDED SCIATICA: Primary | ICD-10-CM

## 2020-06-12 PROCEDURE — 97161 PT EVAL LOW COMPLEX 20 MIN: CPT | Performed by: PHYSICAL THERAPIST

## 2020-06-12 PROCEDURE — 97110 THERAPEUTIC EXERCISES: CPT | Performed by: PHYSICAL THERAPIST

## 2020-06-16 ENCOUNTER — OFFICE VISIT (OUTPATIENT)
Dept: PHYSICAL THERAPY | Facility: CLINIC | Age: 65
End: 2020-06-16
Payer: COMMERCIAL

## 2020-06-16 DIAGNOSIS — M54.42 ACUTE LEFT-SIDED LOW BACK PAIN WITH LEFT-SIDED SCIATICA: Primary | ICD-10-CM

## 2020-06-16 PROCEDURE — 97140 MANUAL THERAPY 1/> REGIONS: CPT | Performed by: PHYSICAL THERAPIST

## 2020-06-16 PROCEDURE — 97110 THERAPEUTIC EXERCISES: CPT | Performed by: PHYSICAL THERAPIST

## 2020-06-16 PROCEDURE — 97112 NEUROMUSCULAR REEDUCATION: CPT | Performed by: PHYSICAL THERAPIST

## 2020-06-19 ENCOUNTER — OFFICE VISIT (OUTPATIENT)
Dept: PHYSICAL THERAPY | Facility: CLINIC | Age: 65
End: 2020-06-19
Payer: COMMERCIAL

## 2020-06-19 DIAGNOSIS — M54.42 ACUTE LEFT-SIDED LOW BACK PAIN WITH LEFT-SIDED SCIATICA: Primary | ICD-10-CM

## 2020-06-19 PROCEDURE — 97110 THERAPEUTIC EXERCISES: CPT

## 2020-06-19 PROCEDURE — 97530 THERAPEUTIC ACTIVITIES: CPT

## 2020-06-23 ENCOUNTER — OFFICE VISIT (OUTPATIENT)
Dept: PHYSICAL THERAPY | Facility: CLINIC | Age: 65
End: 2020-06-23
Payer: COMMERCIAL

## 2020-06-23 DIAGNOSIS — M54.42 ACUTE LEFT-SIDED LOW BACK PAIN WITH LEFT-SIDED SCIATICA: Primary | ICD-10-CM

## 2020-06-23 PROCEDURE — 97140 MANUAL THERAPY 1/> REGIONS: CPT

## 2020-06-23 PROCEDURE — 97110 THERAPEUTIC EXERCISES: CPT

## 2020-06-26 ENCOUNTER — OFFICE VISIT (OUTPATIENT)
Dept: PHYSICAL THERAPY | Facility: CLINIC | Age: 65
End: 2020-06-26
Payer: COMMERCIAL

## 2020-06-26 DIAGNOSIS — M54.42 ACUTE LEFT-SIDED LOW BACK PAIN WITH LEFT-SIDED SCIATICA: Primary | ICD-10-CM

## 2020-06-26 PROCEDURE — 97140 MANUAL THERAPY 1/> REGIONS: CPT

## 2020-06-26 PROCEDURE — 97110 THERAPEUTIC EXERCISES: CPT

## 2020-06-30 ENCOUNTER — OFFICE VISIT (OUTPATIENT)
Dept: PHYSICAL THERAPY | Facility: CLINIC | Age: 65
End: 2020-06-30
Payer: COMMERCIAL

## 2020-06-30 DIAGNOSIS — M54.42 ACUTE LEFT-SIDED LOW BACK PAIN WITH LEFT-SIDED SCIATICA: Primary | ICD-10-CM

## 2020-06-30 PROCEDURE — 97112 NEUROMUSCULAR REEDUCATION: CPT | Performed by: PHYSICAL THERAPIST

## 2020-06-30 PROCEDURE — 97110 THERAPEUTIC EXERCISES: CPT | Performed by: PHYSICAL THERAPIST

## 2020-06-30 PROCEDURE — 97140 MANUAL THERAPY 1/> REGIONS: CPT | Performed by: PHYSICAL THERAPIST

## 2020-07-02 ENCOUNTER — OFFICE VISIT (OUTPATIENT)
Dept: PHYSICAL THERAPY | Facility: CLINIC | Age: 65
End: 2020-07-02
Payer: COMMERCIAL

## 2020-07-02 DIAGNOSIS — M54.42 ACUTE LEFT-SIDED LOW BACK PAIN WITH LEFT-SIDED SCIATICA: Primary | ICD-10-CM

## 2020-07-02 PROCEDURE — 97140 MANUAL THERAPY 1/> REGIONS: CPT

## 2020-07-02 PROCEDURE — 97110 THERAPEUTIC EXERCISES: CPT

## 2020-07-02 NOTE — PROGRESS NOTES
Daily Note     Today's date: 2020  Patient name: Yamila Omalley  : 1955  MRN: [id number]  Referring provider: PEYTON Schwab  Dx:   Encounter Diagnosis     ICD-10-CM    1  Acute left-sided low back pain with left-sided sciatica M54 42                   Subjective: Patient reports he has been feeling better  Patient reports he walked almost 2 miles yesterday - "I didn't really have any leg cramps or pain "    Objective: See treatment diary below  Assessment: Bridging is slightly more painful today therefore modified therapeutic exercise program as noted  Patient will benefit from continued PT in order to further decrease back pain  Plan: Continue treatment as per PT plan of care         Precautions: chronic lumbar pain, HTN    Manuals        Lumbar pa's kl  KL KL kl KL       L sideglide if unable to perform indep             Laser paraspinals 5' 5' 5' 5' 5' 5'                    Neuro Re-Ed             TaA 5"x10 5"x10           TaA bridge with ball squeeze 5"x20  NP due to pain 5"x20 5"x30 5"x10       TaA bridge with clamshell Green tb 5"x20 green  20 blue  20 blue  20 Blue x20 NP       Prone hip ext nv HEP  20 ea 20 20 ea       TaA step out with press red tb 5"x10ea red  15 ea red  20 ea NP np NP       TaA LPD Blue tb x20 blue  20 blue  20 blue  20 20 black  20       TaA rows      black  20                                                                                                  Ther Ex             HS stretch 30"x4 30"x4 30"x4 30"x4 30"x4 30"x4       ltr 5"x10 HEP 5"x10 5"x10 5"x10 5"x10       Prone press ups 5" 2x10 20 NP 20         L sideglides 10 10 NP          Hep review             Repeated ext stand with strap   20 20 20 20       Hip flexor stretch     High low table with pball 30"x4ea HEP                    Ther Activity             bike 8' 8' 8' 8' 8' lvl 4 8'                    Gait Training                                       Modalities TENS prn             MHP prn

## 2020-07-03 ENCOUNTER — APPOINTMENT (OUTPATIENT)
Dept: PHYSICAL THERAPY | Facility: CLINIC | Age: 65
End: 2020-07-03
Payer: COMMERCIAL

## 2020-07-07 ENCOUNTER — APPOINTMENT (OUTPATIENT)
Dept: PHYSICAL THERAPY | Facility: CLINIC | Age: 65
End: 2020-07-07
Payer: COMMERCIAL

## 2020-07-10 ENCOUNTER — APPOINTMENT (OUTPATIENT)
Dept: PHYSICAL THERAPY | Facility: CLINIC | Age: 65
End: 2020-07-10
Payer: COMMERCIAL

## 2020-07-10 ENCOUNTER — OFFICE VISIT (OUTPATIENT)
Dept: PHYSICAL THERAPY | Facility: CLINIC | Age: 65
End: 2020-07-10
Payer: COMMERCIAL

## 2020-07-10 DIAGNOSIS — M54.42 ACUTE LEFT-SIDED LOW BACK PAIN WITH LEFT-SIDED SCIATICA: Primary | ICD-10-CM

## 2020-07-10 PROCEDURE — 97110 THERAPEUTIC EXERCISES: CPT

## 2020-07-10 NOTE — PROGRESS NOTES
Daily Note     Today's date: 7/10/2020  Patient name: Keiko Chambers  : 1955  MRN: [id number]  Referring provider: PEYTON Sharp  Dx:   Encounter Diagnosis     ICD-10-CM    1  Acute left-sided low back pain with left-sided sciatica M54 42                   Subjective: Patient reports he has been feeling significantly better over the last several days  Patient reports compliance with the HEP as well as walking  Objective: See treatment diary below  Assessment: Progression of therapeutic exercise program is tolerated well without complaints  Patient denies back pain post treatment  Plan: Continue treatment as per PT plan of care         Precautions: chronic lumbar pain, HTN    Manuals 6/16 6/19 6/22 6/26 6/30 7/2 7/10      Lumbar pa's kl  KL KL kl KL       L sideglide if unable to perform indep             Laser paraspinals 5' 5' 5' 5' 5' 5' 5'                   Neuro Re-Ed             TaA 5"x10 5"x10           TaA bridge with ball squeeze 5"x20  NP due to pain 5"x20 5"x30 5"x10 feet on pball  5"x20      TaA bridge with clamshell Green tb 5"x20 green  20 blue  20 blue  20 Blue x20 NP blue  20      Prone hip ext nv HEP  20 ea 20 20 ea 20 ea      TaA step out with press red tb 5"x10ea red  15 ea red  20 ea NP np NP       TaA LPD Blue tb x20 blue  20 blue  20 blue  20 20 black  20 black  30      TaA rows      black  20 black  30                                                                                                 Ther Ex             HS stretch 30"x4 30"x4 30"x4 30"x4 30"x4 30"x4 30"x3      ltr 5"x10 HEP 5"x10 5"x10 5"x10 5"x10       Prone press ups 5" 2x10 20 NP 20         L sideglides 10 10 NP          Hep review             Repeated ext stand with strap   20 20 20 20       Hip flexor stretch     High low table with pball 30"x4ea HEP       sidestepping       blue  12ft x4      Ther Activity             bike 8' 8' 8' 8' 8' lvl 4 8' 8'                   Gait Training Modalities             TENS prn             MHP prn

## 2020-07-14 ENCOUNTER — APPOINTMENT (OUTPATIENT)
Dept: PHYSICAL THERAPY | Facility: CLINIC | Age: 65
End: 2020-07-14
Payer: COMMERCIAL

## 2020-07-15 ENCOUNTER — OFFICE VISIT (OUTPATIENT)
Dept: PHYSICAL THERAPY | Facility: CLINIC | Age: 65
End: 2020-07-15
Payer: COMMERCIAL

## 2020-07-15 DIAGNOSIS — M54.42 ACUTE LEFT-SIDED LOW BACK PAIN WITH LEFT-SIDED SCIATICA: Primary | ICD-10-CM

## 2020-07-15 PROCEDURE — 97110 THERAPEUTIC EXERCISES: CPT

## 2020-07-15 NOTE — PROGRESS NOTES
Daily Note     Today's date: 7/15/2020  Patient name: Priscilla Hicks  : 1955  MRN: [id number]  Referring provider: PEYTON River  Dx:   Encounter Diagnosis     ICD-10-CM    1  Acute left-sided low back pain with left-sided sciatica M54 42                   Subjective: Patient reports he continues to be feeling better  Patient states, "If anything, I felt a slight tingling in my left leg this morning "  Patient reports he walked 2 1/2 miles yesterday  Patient reports he has been able to get on and off his bicycle and motorcycle more easily  Objective: See treatment diary below  Assessment: Increased weight when performing rows is tolerated well  Fatigue noted in bilateral lower extremities when performing theraband sidestepping  Plan: Continue treatment as per PT plan of care         Precautions: chronic lumbar pain, HTN    Manuals 6/16 6/19 6/22 6/26 6/30 7/2 7/10 7/15     Lumbar pa's kl  KL KL kl KL       L sideglide if unable to perform indep             Laser paraspinals 5' 5' 5' 5' 5' 5' 5' 5'                  Neuro Re-Ed             TaA 5"x10 5"x10           TaA bridge with ball squeeze 5"x20  NP due to pain 5"x20 5"x30 5"x10 feet on pball  5"x20 feet on pball  5"x20     TaA bridge with clamshell Green tb 5"x20 green  20 blue  20 blue  20 Blue x20 NP blue  20 blue  25     Prone hip ext nv HEP  20 ea 20 20 ea 20 ea 25 ea     TaA step out with press red tb 5"x10ea red  15 ea red  20 ea NP np NP       TaA LPD Blue tb x20 blue  20 blue  20 blue  20 20 black  20 black  30 black  30     TaA rows      black  20 black  30 30#  20                                                                                                Ther Ex             HS stretch 30"x4 30"x4 30"x4 30"x4 30"x4 30"x4 30"x3 30"x3     ltr 5"x10 HEP 5"x10 5"x10 5"x10 5"x10  5"x10     Prone press ups 5" 2x10 20 NP 20         L sideglides 10 10 NP          Hep review             Repeated ext stand with strap   20 20 20 20 Hip flexor stretch     High low table with pball 30"x4ea HEP       sidestepping       blue  12ft x4 blue  12ft x6     Ther Activity             bike 8' 8' 8' 8' 8' lvl 4 8' 8' 10'                  Gait Training                                       Modalities             TENS prn             MHP prn

## 2020-07-17 ENCOUNTER — OFFICE VISIT (OUTPATIENT)
Dept: PHYSICAL THERAPY | Facility: CLINIC | Age: 65
End: 2020-07-17
Payer: COMMERCIAL

## 2020-07-17 DIAGNOSIS — M54.42 ACUTE LEFT-SIDED LOW BACK PAIN WITH LEFT-SIDED SCIATICA: Primary | ICD-10-CM

## 2020-07-17 PROCEDURE — 97110 THERAPEUTIC EXERCISES: CPT

## 2020-07-17 NOTE — PROGRESS NOTES
Daily Note     Today's date: 2020  Patient name: Dharmesh Kim  : 1955  MRN: [id number]  Referring provider: PEYTON Arteaga  Dx:   Encounter Diagnosis     ICD-10-CM    1  Acute left-sided low back pain with left-sided sciatica M54 42                   Subjective: Patient reports he had tingling in the left leg after Wednesday's PT treatment however it didn't last   Patient reports he had left sided low back pain first thing this morning however it also didn't last   Patient reports performing the HEP offers pain relief and he performed this early this morning  Objective: See treatment diary below  Assessment: Treatment is tolerated well overall  Mild discomfort reported left side low back with progression to performing prone alternating arm and leg lifts  Bilateral lower extremity fatigue noted when performing theraband sidestepping  Plan: Continue treatment as per PT plan of care         Precautions: chronic lumbar pain, HTN    Manuals 6/16 6/19 6/22 6/26 6/30 7/2 7/10 7/15 7/17    Lumbar pa's kl  KL KL kl KL       L sideglide if unable to perform indep             Laser paraspinals 5' 5' 5' 5' 5' 5' 5' 5' 5'                 Neuro Re-Ed             TaA 5"x10 5"x10           TaA bridge with ball squeeze 5"x20  NP due to pain 5"x20 5"x30 5"x10 feet on pball  5"x20 feet on pball  5"x20 feet on pball  5"x20    TaA bridge with clamshell Green tb 5"x20 green  20 blue  20 blue  20 Blue x20 NP blue  20 blue  25 blue  2x10    Prone hip ext nv HEP  20 ea 20 20 ea 20 ea 25 ea     TaA step out with press red tb 5"x10ea red  15 ea red  20 ea NP np NP       TaA LPD Blue tb x20 blue  20 blue  20 blue  20 20 black  20 black  30 black  30 black  3x10    TaA rows      black  20 black  30 30#  20 30#  3x10    Prone alt ue/le         2x10                                                                                  Ther Ex             HS stretch 30"x4 30"x4 30"x4 30"x4 30"x4 30"x4 30"x3 30"x3 30"x3 ltr 5"x10 HEP 5"x10 5"x10 5"x10 5"x10  5"x10 5"x10    Prone press ups 5" 2x10 20 NP 20         L sideglides 10 10 NP          Hep review             Repeated ext stand with strap   20 20 20 20       Hip flexor stretch     High low table with pball 30"x4ea HEP       sidestepping       blue  12ft x4 blue  12ft x6 blue  12ft x6    LPD          30#   2x10                              Ther Activity             bike 8' 8' 8' 8' 8' lvl 4 8' 8' 10' NP                 Gait Training                                       Modalities             TENS prn             MHP prn

## 2020-07-21 ENCOUNTER — OFFICE VISIT (OUTPATIENT)
Dept: PHYSICAL THERAPY | Facility: CLINIC | Age: 65
End: 2020-07-21
Payer: COMMERCIAL

## 2020-07-21 DIAGNOSIS — M54.42 ACUTE LEFT-SIDED LOW BACK PAIN WITH LEFT-SIDED SCIATICA: Primary | ICD-10-CM

## 2020-07-21 PROCEDURE — 97112 NEUROMUSCULAR REEDUCATION: CPT

## 2020-07-21 PROCEDURE — 97110 THERAPEUTIC EXERCISES: CPT

## 2020-07-21 PROCEDURE — 97530 THERAPEUTIC ACTIVITIES: CPT

## 2020-07-21 NOTE — PROGRESS NOTES
Daily Note     Today's date: 2020  Patient name: Keiko Chambers  : 1955  MRN: [id number]  Referring provider: PEYTON Sharp  Dx:   Encounter Diagnosis     ICD-10-CM    1  Acute left-sided low back pain with left-sided sciatica M54 42                   Subjective: Over the weekend, patient reports he was able to comfortably ride his bike on the trail for 40 miles  Patient reports feeling occasional tingling in the left leg when walking in the evening - "It comes and goes" adding it completely resolved in 15 minutes during his walk last night  Patient reports he is doing well with the HEP  Objective: See treatment diary below  Assessment: Progression of therapeutic exercise program is tolerated well  After reviewing proper body mechanics, patient is able to lift 15 pound crate without significant difficulty  Plan: Continue treatment as per PT plan of care         Precautions: chronic lumbar pain, HTN    Manuals  7/2 7/10 7/15 7/17 7/21   Lumbar pa's kl  KL KL kl KL       L sideglide if unable to perform indep             Laser paraspinals 5' 5' 5' 5' 5' 5' 5' 5' 5'                 Neuro Re-Ed             TaA 5"x10 5"x10           TaA bridge with ball squeeze 5"x20  NP due to pain 5"x20 5"x30 5"x10 feet on pball  5"x20 feet on pball  5"x20 feet on pball  5"x20 feet on pball  5"x20   TaA bridge with clamshell Green tb 5"x20 green  20 blue  20 blue  20 Blue x20 NP blue  20 blue  25 blue  2x10    Prone hip ext nv HEP  20 ea 20 20 ea 20 ea 25 ea     TaA step out with press red tb 5"x10ea red  15 ea red  20 ea NP np NP    red  2x10 ea side   TaA LPD Blue tb x20 blue  20 blue  20 blue  20 20 black  20 black  30 black  30 black  3x10 black  2x10   TaA rows      black  20 black  30 30#  20 30#  3x10 40#  2x10   Prone alt ue/le         2x10                                                                                  Ther Ex             HS stretch 30"x4 30"x4 30"x4 30"x4 30"x4 30"x4 30"x3 30"x3 30"x3 30"x3   ltr 5"x10 HEP 5"x10 5"x10 5"x10 5"x10  5"x10 5"x10    Prone press ups 5" 2x10 20 NP 20      2x10   L sideglides 10 10 NP          Hep review             Repeated ext stand with strap   20 20 20 20       Hip flexor stretch     High low table with pball 30"x4ea HEP       sidestepping       blue  12ft x4 blue  12ft x6 blue  12ft x6 blue  12ft x6   LPD          30#   2x10 40#  2x10   Leg press squats          30#  2x10                Ther Activity             bike 8' 8' 8' 8' 8' lvl 4 8' 8' 10' NP 5'   Crate lift          15 lbs  x10   Gait Training                                       Modalities             TENS prn             MHP prn

## 2020-07-24 ENCOUNTER — OFFICE VISIT (OUTPATIENT)
Dept: PHYSICAL THERAPY | Facility: CLINIC | Age: 65
End: 2020-07-24
Payer: COMMERCIAL

## 2020-07-24 DIAGNOSIS — M54.42 ACUTE LEFT-SIDED LOW BACK PAIN WITH LEFT-SIDED SCIATICA: Primary | ICD-10-CM

## 2020-07-24 PROCEDURE — 97110 THERAPEUTIC EXERCISES: CPT

## 2020-07-24 PROCEDURE — 97530 THERAPEUTIC ACTIVITIES: CPT

## 2020-07-24 PROCEDURE — 97112 NEUROMUSCULAR REEDUCATION: CPT

## 2020-07-24 NOTE — PROGRESS NOTES
Daily Note     Today's date: 2020  Patient name: Irene Smith  : 1955  MRN: [id number]  Referring provider: PEYTON Epstein  Dx:   Encounter Diagnosis     ICD-10-CM    1  Acute left-sided low back pain with left-sided sciatica M54 42                   Subjective: Patient reports he has been feeling good  Patient states, "I was asymptomatic yesterday "    Objective: See treatment diary below  Assessment: Therapeutic exercise program is tolerated well  Slight discomfort in the left low back noted when performing prone alternating arm and leg lift however after performing prone press ups symptoms resolve  Plan: Continue treatment as per PT plan of care         Precautions: chronic lumbar pain, HTN    Manuals 7/24    6/30 7/2 7/10 7/15 7/17 7/21   Lumbar pa's     kl KL       L sideglide if unable to perform indep             Laser paraspinals     5' 5' 5' 5' 5'                 Neuro Re-Ed             TaA             TaA bridge with ball squeeze     5"x30 5"x10 feet on pball  5"x20 feet on pball  5"x20 feet on pball  5"x20 feet on pball  5"x20   TaA bridge with clamshell blue  3x10    Blue x20 NP blue  20 blue  25 blue  2x10    Prone hip ext     20 20 ea 20 ea 25 ea     TaA step out with press green  2x10 ea side    np NP    red  2x10 ea side   TaA LPD black  3x10    20 black  20 black  30 black  30 black  3x10 black  2x10   TaA rows 40#  3x10     black  20 black  30 30#  20 30#  3x10 40#  2x10   Prone alt ue/le 3x10        2x10                                                                                  Ther Ex             HS stretch 30"x3    30"x4 30"x4 30"x3 30"x3 30"x3 30"x3   ltr     5"x10 5"x10  5"x10 5"x10    Prone press ups 3x10         2x10   L sideglides             Repeated ext stand with strap     20 20       Hip flexor stretch     High low table with pball 30"x4ea HEP       sidestepping blue  12ft x10      blue  12ft x4 blue  12ft x6 blue  12ft x6 blue  12ft x6   LPD 40#  3x10 30#   2x10 40#  2x10   Leg press squats 30#  3x10         30#  2x10                Ther Activity             bike 8'    8' lvl 4 8' 8' 10' NP 5'   Crate lift 20 lbs  x10         15 lbs  x10   Gait Training                                       Modalities             TENS prn             MHP prn

## 2020-07-28 ENCOUNTER — OFFICE VISIT (OUTPATIENT)
Dept: PHYSICAL THERAPY | Facility: CLINIC | Age: 65
End: 2020-07-28
Payer: COMMERCIAL

## 2020-07-28 DIAGNOSIS — M54.42 ACUTE LEFT-SIDED LOW BACK PAIN WITH LEFT-SIDED SCIATICA: Primary | ICD-10-CM

## 2020-07-28 PROCEDURE — 97530 THERAPEUTIC ACTIVITIES: CPT

## 2020-07-28 PROCEDURE — 97112 NEUROMUSCULAR REEDUCATION: CPT

## 2020-07-28 PROCEDURE — 97110 THERAPEUTIC EXERCISES: CPT

## 2020-07-28 NOTE — PROGRESS NOTES
Daily Note     Today's date: 2020  Patient name: Migue Del Toro  : 1955  MRN: [id number]  Referring provider: PEYTON Goode  Dx:   Encounter Diagnosis     ICD-10-CM    1  Acute left-sided low back pain with left-sided sciatica M54 42                   Subjective: Patient reports he continues to feel better  Patient reports he has been more comfortable walking and riding his motorcycle  Objective: See treatment diary below  Assessment: Patient is doing well with therapeutic exercise program   No complaints of significant back pain or left leg pain reported throughout today's PT treatment  Patient will benefit from continued PT to further increase overall strength and mobility  Plan: Continue treatment as per PT plan of care          Precautions: chronic lumbar pain, HTN    Manuals 7/24 7/28   6/30 7/2 7/10 7/15 7/17 7/21   Lumbar pa's     kl KL       L sideglide if unable to perform indep             Laser paraspinals     5' 5' 5' 5' 5'                 Neuro Re-Ed             TaA             TaA bridge with ball squeeze     5"x30 5"x10 feet on pball  5"x20 feet on pball  5"x20 feet on pball  5"x20 feet on pball  5"x20   TaA bridge with clamshell blue  3x10 blue  3x10   Blue x20 NP blue  20 blue  25 blue  2x10    Prone hip ext     20 20 ea 20 ea 25 ea     TaA step out with press green  2x10 ea side green  2x10 ea   np NP    red  2x10 ea side   TaA LPD black  3x10 black  3x10   20 black  20 black  30 black  30 black  3x10 black  2x10   TaA rows 40#  3x10 40#  3x10    black  20 black  30 30#  20 30#  3x10 40#  2x10   Prone alt ue/le 3x10        2x10    bosu step up forward and lateral  10 ea                                                                            Ther Ex             HS stretch 30"x3 30"x4   30"x4 30"x4 30"x3 30"x3 30"x3 30"x3   ltr     5"x10 5"x10  5"x10 5"x10    Prone press ups 3x10 3x10        2x10   L sideglides             Repeated ext stand with strap     20 20 Hip flexor stretch     High low table with pball 30"x4ea HEP       sidestepping blue  12ft x10 blue  12ft x10     blue  12ft x4 blue  12ft x6 blue  12ft x6 blue  12ft x6   LPD 40#  3x10  40#   3x10        30#   2x10 40#  2x10   Leg press squats 30#  3x10  30#   3x10        30#  2x10                Ther Activity             bike 8' 8'   8' lvl 4 8' 8' 10' NP 5'   Crate lift 20 lbs  x10 20 lbs  x10        15 lbs  x10   Gait Training                                       Modalities             TENS prn             MHP prn

## 2020-07-31 ENCOUNTER — OFFICE VISIT (OUTPATIENT)
Dept: PHYSICAL THERAPY | Facility: CLINIC | Age: 65
End: 2020-07-31
Payer: COMMERCIAL

## 2020-07-31 DIAGNOSIS — M54.42 ACUTE LEFT-SIDED LOW BACK PAIN WITH LEFT-SIDED SCIATICA: Primary | ICD-10-CM

## 2020-07-31 PROCEDURE — 97110 THERAPEUTIC EXERCISES: CPT

## 2020-07-31 PROCEDURE — 97530 THERAPEUTIC ACTIVITIES: CPT

## 2020-07-31 PROCEDURE — 97112 NEUROMUSCULAR REEDUCATION: CPT

## 2020-07-31 NOTE — PROGRESS NOTES
Daily Note     Today's date: 2020  Patient name: Jace Chadwick  : 1955  MRN: [id number]  Referring provider: PEYTON Marino  Dx:   Encounter Diagnosis     ICD-10-CM    1  Acute left-sided low back pain with left-sided sciatica M54 42                   Subjective: Patient reports his back is feeling "a little sore" this morning  Patient reports he rode his motorcycle and cut grass yesterday  Objective: See treatment diary below  Assessment: Fatigue noted with progression of therapeutic exercise program   Good body mechanics and improved lower extremity strength noted when performing crate lifts  Plan: Continue treatment as per PT plan of care         Precautions: chronic lumbar pain, HTN    Manuals 7/24 7/28 7/31   7/2 7/10 7/15 7/17 7/21   Lumbar pa's      KL       L sideglide if unable to perform indep             Laser paraspinals      5' 5' 5' 5'                 Neuro Re-Ed             TaA             TaA bridge with ball squeeze      5"x10 feet on pball  5"x20 feet on pball  5"x20 feet on pball  5"x20 feet on pball  5"x20   TaA bridge with clamshell blue  3x10 blue  3x10 blue  3x10   NP blue  20 blue  25 blue  2x10    Prone hip ext      20 ea 20 ea 25 ea     TaA step out with press green  2x10 ea side green  2x10 ea green  30 ea   NP    red  2x10 ea side   TaA LPD black  3x10 black  3x10 black   3x10   black  20 black  30 black  30 black  3x10 black  2x10   TaA rows 40#  3x10 40#  3x10 40#  3x10   black  20 black  30 30#  20 30#  3x10 40#  2x10   Prone alt ue/le 3x10        2x10    bosu step up forward and lateral  10 ea 15 ea                                                                           Ther Ex             HS stretch 30"x3 30"x4 30"x4   30"x4 30"x3 30"x3 30"x3 30"x3   ltr      5"x10  5"x10 5"x10    Prone press ups 3x10 3x10 3x10       2x10   L sideglides             Repeated ext stand with strap      20       Hip flexor stretch      HEP       sidestepping blue  12ft x10 blue  12ft x10 blue  12ft x10    blue  12ft x4 blue  12ft x6 blue  12ft x6 blue  12ft x6   LPD 40#  3x10  40#   3x10  40#   3x10       30#   2x10 40#  2x10   Leg press squats 30#  3x10  30#   3x10  40#   3x10       30#  2x10                Ther Activity             bike 8' 8' 8'   8' 8' 10' NP 5'   Crate lift 20 lbs  x10 20 lbs  x10 25 lbs  x10       15 lbs  x10   Gait Training                                       Modalities             TENS prn             MHP prn

## 2020-08-04 ENCOUNTER — OFFICE VISIT (OUTPATIENT)
Dept: PHYSICAL THERAPY | Facility: CLINIC | Age: 65
End: 2020-08-04
Payer: COMMERCIAL

## 2020-08-04 DIAGNOSIS — M54.42 ACUTE LEFT-SIDED LOW BACK PAIN WITH LEFT-SIDED SCIATICA: Primary | ICD-10-CM

## 2020-08-04 PROCEDURE — 97110 THERAPEUTIC EXERCISES: CPT | Performed by: PHYSICAL THERAPIST

## 2020-08-04 PROCEDURE — 97112 NEUROMUSCULAR REEDUCATION: CPT | Performed by: PHYSICAL THERAPIST

## 2020-08-04 PROCEDURE — 97530 THERAPEUTIC ACTIVITIES: CPT | Performed by: PHYSICAL THERAPIST

## 2020-08-04 NOTE — PROGRESS NOTES
Daily Note/DC summary    Today's date: 2020  Patient name: Shelly Major  : 1955  MRN: [id number]  Referring provider: PEYTON Heart  Dx:   Encounter Diagnosis     ICD-10-CM    1  Acute left-sided low back pain with left-sided sciatica  M54 42                   Subjective: Pt reports that he is pleased with his progress  Reports that he is compliant with his hep and feel confident in his ability to perform it indep  Objective: See treatment diary below  Assessment: Pt demonstrates independence with therex program  rom is [facility] t/o lumbar spine    Pt was instructed in proper progression of his stabilization program      Plan: DC to hep with instruction to call the clinic       Precautions: chronic lumbar pain, HTN    Manuals 7/24 7/28 7/31 8/4  7/2 7/10 7/15 7/17 7/21   Lumbar pa's      KL       L sideglide if unable to perform indep             Laser paraspinals      5' 5' 5' 5'                 Neuro Re-Ed             TaA             TaA bridge with ball squeeze      5"x10 feet on pball  5"x20 feet on pball  5"x20 feet on pball  5"x20 feet on pball  5"x20   TaA bridge with clamshell blue  3x10 blue  3x10 blue  3x10 Black x30  NP blue  20 blue  25 blue  2x10    Prone hip ext      20 ea 20 ea 25 ea     TaA step out with press green  2x10 ea side green  2x10 ea green  30 ea Green x30  NP    red  2x10 ea side   TaA LPD black  3x10 black  3x10 black   3x10 Black x30  black  20 black  30 black  30 black  3x10 black  2x10   TaA rows 40#  3x10 40#  3x10 40#  3x10 40#x30  black  20 black  30 30#  20 30#  3x10 40#  2x10   Prone alt ue/le 3x10        2x10    bosu step up forward and lateral  10 ea 15 ea 15ea                                                                          Ther Ex             HS stretch 30"x3 30"x4 30"x4 30"x4  30"x4 30"x3 30"x3 30"x3 30"x3   ltr      5"x10  5"x10 5"x10    Prone press ups 3x10 3x10 3x10 3x10      2x10   L sideglides             Repeated ext stand with strap 20       Hip flexor stretch      HEP       sidestepping blue  12ft x10 blue  12ft x10 blue  12ft x10 Blue x10   blue  12ft x4 blue  12ft x6 blue  12ft x6 blue  12ft x6   LPD 40#  3x10  40#   3x10  40#   3x10 40#x30      30#   2x10 40#  2x10   Leg press squats 30#  3x10  30#   3x10  40#   3x10 40#x30      30#  2x10                Ther Activity             bike 8' 8' 8' 8'  8' 8' 10' NP 5'   Crate lift 20 lbs  x10 20 lbs  x10 25 lbs  x10 25lbs      15 lbs  x10   Gait Training    10                                   Modalities             TENS prn             MHP prn

## 2020-08-07 ENCOUNTER — APPOINTMENT (OUTPATIENT)
Dept: PHYSICAL THERAPY | Facility: CLINIC | Age: 65
End: 2020-08-07
Payer: COMMERCIAL

## 2020-08-13 PROBLEM — J30.1 CHRONIC SEASONAL ALLERGIC RHINITIS DUE TO POLLEN: Status: ACTIVE | Noted: 2020-08-13

## 2020-08-13 PROBLEM — T46.4X5A ANGIOTENSIN CONVERTING ENZYME INHIBITOR (ACE-I) INDUCED ANGIOEDEMA OF INTESTINE: Status: ACTIVE | Noted: 2020-08-13

## 2020-08-13 PROBLEM — H10.13 ALLERGIC CONJUNCTIVITIS OF BOTH EYES: Status: ACTIVE | Noted: 2020-08-13

## 2020-08-13 PROBLEM — T78.3XXA ANGIOTENSIN CONVERTING ENZYME INHIBITOR (ACE-I) INDUCED ANGIOEDEMA OF INTESTINE: Status: ACTIVE | Noted: 2020-08-13

## 2020-08-13 PROBLEM — J30.89 ALLERGIC RHINITIS DUE TO HOUSE DUST MITE: Status: ACTIVE | Noted: 2020-08-13

## 2020-08-13 PROBLEM — L20.84 INTRINSIC ATOPIC DERMATITIS: Status: ACTIVE | Noted: 2020-08-13

## 2020-08-28 ENCOUNTER — LAB (OUTPATIENT)
Dept: LAB | Facility: HOSPITAL | Age: 65
End: 2020-08-28
Payer: COMMERCIAL

## 2020-08-28 DIAGNOSIS — T78.3XXA ANGIOEDEMA, INITIAL ENCOUNTER: Primary | ICD-10-CM

## 2020-08-28 DIAGNOSIS — T78.3XXA ANGIOTENSIN CONVERTING ENZYME INHIBITOR (ACE-I) INDUCED ANGIOEDEMA OF INTESTINE: ICD-10-CM

## 2020-08-28 DIAGNOSIS — L20.84 INTRINSIC ATOPIC DERMATITIS: ICD-10-CM

## 2020-08-28 LAB
BASOPHILS # BLD AUTO: 0.04 THOUSANDS/ΜL (ref 0–0.1)
BASOPHILS NFR BLD AUTO: 1 % (ref 0–1)
C4 SERPL-MCNC: 23 MG/DL (ref 10–40)
EOSINOPHIL # BLD AUTO: 0.13 THOUSAND/ΜL (ref 0–0.61)
EOSINOPHIL NFR BLD AUTO: 2 % (ref 0–6)
ERYTHROCYTE [DISTWIDTH] IN BLOOD BY AUTOMATED COUNT: 12.2 % (ref 11.6–15.1)
HCT VFR BLD AUTO: 43 % (ref 36.5–49.3)
HGB BLD-MCNC: 14.4 G/DL (ref 12–17)
IGA SERPL-MCNC: 224 MG/DL (ref 70–400)
IGG SERPL-MCNC: 1090 MG/DL (ref 700–1600)
IGM SERPL-MCNC: 83 MG/DL (ref 40–230)
IMM GRANULOCYTES # BLD AUTO: 0.01 THOUSAND/UL (ref 0–0.2)
IMM GRANULOCYTES NFR BLD AUTO: 0 % (ref 0–2)
LYMPHOCYTES # BLD AUTO: 1.14 THOUSANDS/ΜL (ref 0.6–4.47)
LYMPHOCYTES NFR BLD AUTO: 21 % (ref 14–44)
MCH RBC QN AUTO: 31.6 PG (ref 26.8–34.3)
MCHC RBC AUTO-ENTMCNC: 33.5 G/DL (ref 31.4–37.4)
MCV RBC AUTO: 94 FL (ref 82–98)
MONOCYTES # BLD AUTO: 0.48 THOUSAND/ΜL (ref 0.17–1.22)
MONOCYTES NFR BLD AUTO: 9 % (ref 4–12)
NEUTROPHILS # BLD AUTO: 3.61 THOUSANDS/ΜL (ref 1.85–7.62)
NEUTS SEG NFR BLD AUTO: 67 % (ref 43–75)
NRBC BLD AUTO-RTO: 0 /100 WBCS
PLATELET # BLD AUTO: 193 THOUSANDS/UL (ref 149–390)
PMV BLD AUTO: 11.8 FL (ref 8.9–12.7)
RBC # BLD AUTO: 4.56 MILLION/UL (ref 3.88–5.62)
TSH SERPL DL<=0.05 MIU/L-ACNC: 1.1 UIU/ML (ref 0.36–3.74)
WBC # BLD AUTO: 5.41 THOUSAND/UL (ref 4.31–10.16)

## 2020-08-28 PROCEDURE — 84166 PROTEIN E-PHORESIS/URINE/CSF: CPT

## 2020-08-28 PROCEDURE — 84165 PROTEIN E-PHORESIS SERUM: CPT

## 2020-08-28 PROCEDURE — 36415 COLL VENOUS BLD VENIPUNCTURE: CPT

## 2020-08-28 PROCEDURE — 85025 COMPLETE CBC W/AUTO DIFF WBC: CPT

## 2020-08-28 PROCEDURE — 86160 COMPLEMENT ANTIGEN: CPT

## 2020-08-28 PROCEDURE — 86161 COMPLEMENT/FUNCTION ACTIVITY: CPT

## 2020-08-28 PROCEDURE — 83520 IMMUNOASSAY QUANT NOS NONAB: CPT

## 2020-08-28 PROCEDURE — 84443 ASSAY THYROID STIM HORMONE: CPT

## 2020-08-28 PROCEDURE — 84165 PROTEIN E-PHORESIS SERUM: CPT | Performed by: PATHOLOGY

## 2020-08-28 PROCEDURE — 86038 ANTINUCLEAR ANTIBODIES: CPT

## 2020-08-28 PROCEDURE — 84166 PROTEIN E-PHORESIS/URINE/CSF: CPT | Performed by: PATHOLOGY

## 2020-08-28 PROCEDURE — 82784 ASSAY IGA/IGD/IGG/IGM EACH: CPT

## 2020-08-30 LAB — TRYPTASE SERPL-MCNC: 5.1 UG/L (ref 2.2–13.2)

## 2020-08-31 LAB
ALBUMIN SERPL ELPH-MCNC: 4.79 G/DL (ref 3.5–5)
ALBUMIN SERPL ELPH-MCNC: 62.2 % (ref 52–65)
ALBUMIN UR ELPH-MCNC: 100 %
ALPHA1 GLOB MFR UR ELPH: 0 %
ALPHA1 GLOB SERPL ELPH-MCNC: 0.33 G/DL (ref 0.1–0.4)
ALPHA1 GLOB SERPL ELPH-MCNC: 4.3 % (ref 2.5–5)
ALPHA2 GLOB MFR UR ELPH: 0 %
ALPHA2 GLOB SERPL ELPH-MCNC: 0.7 G/DL (ref 0.4–1.2)
ALPHA2 GLOB SERPL ELPH-MCNC: 9.1 % (ref 7–13)
B-GLOBULIN MFR UR ELPH: 0 %
BETA GLOB ABNORMAL SERPL ELPH-MCNC: 0.45 G/DL (ref 0.4–0.8)
BETA1 GLOB SERPL ELPH-MCNC: 5.9 % (ref 5–13)
BETA2 GLOB SERPL ELPH-MCNC: 5 % (ref 2–8)
BETA2+GAMMA GLOB SERPL ELPH-MCNC: 0.39 G/DL (ref 0.2–0.5)
C1INH ACT/NOR SERPL: 87 %MEAN NORMAL
C1INH SERPL-MCNC: 32 MG/DL (ref 21–39)
GAMMA GLOB ABNORMAL SERPL ELPH-MCNC: 1.04 G/DL (ref 0.5–1.6)
GAMMA GLOB MFR UR ELPH: 0 %
GAMMA GLOB SERPL ELPH-MCNC: 13.5 % (ref 12–22)
IGG/ALB SER: 1.65 {RATIO} (ref 1.1–1.8)
PROT PATTERN SERPL ELPH-IMP: NORMAL
PROT PATTERN UR ELPH-IMP: NORMAL
PROT SERPL-MCNC: 7.7 G/DL (ref 6.4–8.2)
PROT UR-MCNC: 6 MG/DL

## 2020-09-02 LAB — MISCELLANEOUS LAB TEST RESULT: NORMAL

## 2020-09-21 ENCOUNTER — APPOINTMENT (OUTPATIENT)
Dept: LAB | Facility: HOSPITAL | Age: 65
End: 2020-09-21
Payer: COMMERCIAL

## 2020-09-21 DIAGNOSIS — E78.49 OTHER HYPERLIPIDEMIA: ICD-10-CM

## 2020-09-21 DIAGNOSIS — T78.3XXD ANGIOEDEMA, SUBSEQUENT ENCOUNTER: ICD-10-CM

## 2020-09-21 DIAGNOSIS — M54.42 ACUTE LEFT-SIDED LOW BACK PAIN WITH LEFT-SIDED SCIATICA: ICD-10-CM

## 2020-09-21 DIAGNOSIS — I10 ESSENTIAL HYPERTENSION: ICD-10-CM

## 2020-09-21 DIAGNOSIS — R73.03 PREDIABETES: ICD-10-CM

## 2020-09-21 LAB
ALBUMIN SERPL BCP-MCNC: 4.4 G/DL (ref 3.5–5)
ALP SERPL-CCNC: 94 U/L (ref 46–116)
ALT SERPL W P-5'-P-CCNC: 35 U/L (ref 12–78)
ANION GAP SERPL CALCULATED.3IONS-SCNC: 3 MMOL/L (ref 4–13)
AST SERPL W P-5'-P-CCNC: 23 U/L (ref 5–45)
BILIRUB SERPL-MCNC: 1.41 MG/DL (ref 0.2–1)
BUN SERPL-MCNC: 17 MG/DL (ref 5–25)
CALCIUM SERPL-MCNC: 9 MG/DL (ref 8.3–10.1)
CHLORIDE SERPL-SCNC: 103 MMOL/L (ref 100–108)
CO2 SERPL-SCNC: 31 MMOL/L (ref 21–32)
CREAT SERPL-MCNC: 0.95 MG/DL (ref 0.6–1.3)
GFR SERPL CREATININE-BSD FRML MDRD: 82 ML/MIN/1.73SQ M
GLUCOSE P FAST SERPL-MCNC: 110 MG/DL (ref 65–99)
POTASSIUM SERPL-SCNC: 4.2 MMOL/L (ref 3.5–5.3)
PROT SERPL-MCNC: 7.7 G/DL (ref 6.4–8.2)
SODIUM SERPL-SCNC: 137 MMOL/L (ref 136–145)

## 2020-09-21 PROCEDURE — 84153 ASSAY OF PSA TOTAL: CPT

## 2020-09-21 PROCEDURE — 36415 COLL VENOUS BLD VENIPUNCTURE: CPT

## 2020-09-21 PROCEDURE — 80053 COMPREHEN METABOLIC PANEL: CPT

## 2020-09-21 PROCEDURE — 84154 ASSAY OF PSA FREE: CPT

## 2020-09-23 LAB
PSA FREE MFR SERPL: 27.5 %
PSA FREE SERPL-MCNC: 0.22 NG/ML
PSA SERPL-MCNC: 0.8 NG/ML (ref 0–4)

## 2020-09-28 LAB — MISCELLANEOUS LAB TEST RESULT: NORMAL

## 2020-09-29 ENCOUNTER — OFFICE VISIT (OUTPATIENT)
Dept: FAMILY MEDICINE CLINIC | Facility: CLINIC | Age: 65
End: 2020-09-29
Payer: COMMERCIAL

## 2020-09-29 VITALS
SYSTOLIC BLOOD PRESSURE: 122 MMHG | HEART RATE: 77 BPM | HEIGHT: 67 IN | TEMPERATURE: 97.8 F | RESPIRATION RATE: 18 BRPM | DIASTOLIC BLOOD PRESSURE: 68 MMHG | BODY MASS INDEX: 25.47 KG/M2 | WEIGHT: 162.3 LBS

## 2020-09-29 DIAGNOSIS — Z12.11 SCREENING FOR MALIGNANT NEOPLASM OF COLON: Primary | ICD-10-CM

## 2020-09-29 DIAGNOSIS — R73.03 PREDIABETES: ICD-10-CM

## 2020-09-29 DIAGNOSIS — Z23 NEED FOR PROPHYLACTIC VACCINATION AND INOCULATION AGAINST INFLUENZA: ICD-10-CM

## 2020-09-29 DIAGNOSIS — E78.49 OTHER HYPERLIPIDEMIA: ICD-10-CM

## 2020-09-29 DIAGNOSIS — Z12.5 SCREENING PSA (PROSTATE SPECIFIC ANTIGEN): ICD-10-CM

## 2020-09-29 DIAGNOSIS — I10 ESSENTIAL HYPERTENSION: ICD-10-CM

## 2020-09-29 DIAGNOSIS — R73.9 ELEVATED BLOOD SUGAR: ICD-10-CM

## 2020-09-29 PROBLEM — T78.3XXA ANGIOEDEMA: Status: ACTIVE | Noted: 2020-09-29

## 2020-09-29 PROCEDURE — 99214 OFFICE O/P EST MOD 30 MIN: CPT | Performed by: NURSE PRACTITIONER

## 2020-09-29 PROCEDURE — 93000 ELECTROCARDIOGRAM COMPLETE: CPT | Performed by: NURSE PRACTITIONER

## 2020-09-29 PROCEDURE — G0008 ADMIN INFLUENZA VIRUS VAC: HCPCS

## 2020-09-29 PROCEDURE — 90662 IIV NO PRSV INCREASED AG IM: CPT

## 2020-09-29 NOTE — PROGRESS NOTES
@[facility]@  Chief Complaint   Patient presents with    Hypertension    Hyperlipidemia    pre diabetes     Assessment/Plan:      1  Screening for malignant neoplasm of colon  Has the script and will schedule   - Ambulatory referral to Gastroenterology; Future    2  Essential hypertension  This is good with the HCTZ,  Will hold the HCTZ and pt will monitor his BP at home and will brenda this here in 3 months   - POCT ECG    3  Need for prophylactic vaccination and inoculation against influenza  Flu shot today   - influenza vaccine, high-dose, PF 0 7 mL (FLUZONE HIGH-DOSE)    4  Other hyperlipidemia  Will brenda this in 3 months   5  Angioedema  We will hold the HCTZ and see if this is helpful  Will continue to follow with allergy   6  Elevated Blood sugar  Will continue to watch diet and will brenda     rto 3 months with labs prior   Subjective:      Patient ID: Migue Del Toro  is a 77 y o  male  Here today to reckon his BP and angioedema  States that he has lost weight due to increased activity   Has stopped the metoprolol and amlodipine due to angioedema of the lips   Has has been seeing allergist for this and has been being worked up for this   Has been holding the ibuprofen for this as well but is still getting the swelling 2 times a week  Is taking allegra for this when he gets this and it this helps  Is still on the HCTZ  Follows BP at home and gets consistently good readings  Hypertension     Hyperlipidemia         The following portions of the patient's history were reviewed and updated as appropriate: allergies, current medications, past family history, past medical history, past social history, past surgical history and problem list   BMI Counseling: Body mass index is 25 42 kg/m²  The BMI is above normal  Nutrition recommendations include reducing portion sizes, decreasing overall calorie intake and 3-5 servings of fruits/vegetables daily   Exercise recommendations include moderate aerobic physical activity for 150 minutes/week    Past Medical History:   Diagnosis Date    Adjustment disorder     last assessed - 25Mar2014    Allergic rhinitis 02/06/2012    Cubital tunnel syndrome 04/20/2010    last assessed - 13Apr2012    Dermatitis 10/17/2011    last assessed - 13Apr2012    Electrolyte or fluid disorder 01/24/2011    Fatty liver 10/06/2008    Gout     Hypertension     Lichen planus 39/15/1049    Varicella     Wears glasses      Past Surgical History:   Procedure Laterality Date    COLONOSCOPY      CYSTOSCOPY W/ URETEROSCOPY W/ LITHOTRIPSY      MOUTH SURGERY      duct blockage after injury-inside cheek     VAS DUPLEX (HISTORICAL)  (DUPLEX EXT VEINS COMPLETE BL)       Family History   Problem Relation Age of Onset    Cancer Mother     Diabetes Mother     Arthritis Father     Heart failure Father         Congesrive heart failure    Hypertension Father     Prostate cancer Father     COPD Sister     Substance Abuse Neg Hx     Mental illness Neg Hx      Social History   Social History     Socioeconomic History    Marital status: Single     Spouse name: Not on file    Number of children: 0    Years of education: Not on file    Highest education level: Not on file   Occupational History    Occupation: [employer]      Comment: retired     Occupation: OR Tech      Employer: [employer]     Comment: retired    Social Needs    Financial resource strain: Not on file    Food insecurity     Worry: Not on file     Inability: Not on file    Transportation needs     Medical: Not on file     Non-medical: Not on file   Tobacco Use    Smoking status: Never Smoker    Smokeless tobacco: Never Used   Substance and Sexual Activity    Alcohol use: Yes     Comment: social alcohol use, drinks 1 glass of wine twice a week    Drug use: No    Sexual activity: Not on file   Lifestyle    Physical activity     Days per week: 7 days     Minutes per session: 40 min    Stress: Not on file Relationships    Social connections     Talks on phone: Not on file     Gets together: Not on file     Attends Faith service: Not on file     Active member of club or organization: Not on file     Attends meetings of clubs or organizations: Not on file     Relationship status: Not on file    Intimate partner violence     Fear of current or ex partner: Not on file     Emotionally abused: Not on file     Physically abused: Not on file     Forced sexual activity: Not on file   Other Topics Concern    Not on file   Social History Narrative    Drinks coffee - drinks 2 cups a day    Has  Smoke detectors    Uses safety equipment - seatbelts            Who lives in your home: alone    What type of home do you live in: Single house    Age of your home: 59 yrs old     How long have you been living there: 20 yrs    Type of heat: Baseboard    Type of fuel: Oil and Gas    What type of annabelle is in your bedroom: Hardwood floor    Do you have the following in or near your home:    Air products: Central air and Dehumidifier    Pests: None    Pets: None    Are pets allowed in bedroom: N/A    Open fields, wooded areas nearby: Open fields and Wooded areas    Basement: Damp, Musty and Unfinished    Exposure to second hand smoke: No        Habits:    Caffeine: coffee 2 cups daily-hot tea occasionally     Chocolate: occasionally     Other:     Review of Systems   Constitutional: Negative  Respiratory: Negative  Cardiovascular: Negative  Musculoskeletal: Negative  Skin: Negative  Neurological: Negative  Psychiatric/Behavioral: Negative  Vitals:    09/29/20 0924   BP: 122/68   BP Location: Left arm   Patient Position: Sitting   Cuff Size: Standard   Pulse: 77   Resp: 18   Temp: 97 8 °F (36 6 °C)   TempSrc: Temporal   Weight: 73 6 kg (162 lb 4 8 oz)   Height: 5' 7" (1 702 m)       Objective:   Wt Readings from Last 3 Encounters:   09/29/20 73 6 kg (162 lb 4 8 oz)   08/13/20 79 8 kg (176 lb)   06/11/20 78 5 kg (173 lb)     BP Readings from Last 3 Encounters:   09/29/20 122/68   08/13/20 123/71   06/11/20 130/80     Pulse Readings from Last 3 Encounters:   09/29/20 77   08/13/20 88   06/11/20 88     BMI Readings from Last 3 Encounters:   09/29/20 25 42 kg/m²   08/13/20 27 16 kg/m²   06/11/20 31 39 kg/m²     Office Visit on 09/29/2020   Component Date Value Ref Range Status    OTHER 09/29/2020    Final    09/29/2020   Appointment on 09/21/2020   Component Date Value Ref Range Status    Sodium 09/21/2020 137  136 - 145 mmol/L Final    Potassium 09/21/2020 4 2  3 5 - 5 3 mmol/L Final    Chloride 09/21/2020 103  100 - 108 mmol/L Final    CO2 09/21/2020 31  21 - 32 mmol/L Final    ANION GAP 09/21/2020 3* 4 - 13 mmol/L Final    BUN 09/21/2020 17  5 - 25 mg/dL Final    Creatinine 09/21/2020 0 95  0 60 - 1 30 mg/dL Final    Standardized to IDMS reference method    Glucose, Fasting 09/21/2020 110* 65 - 99 mg/dL Final    Specimen collection should occur prior to Sulfasalazine administration due to the potential for falsely depressed results  Specimen collection should occur prior to Sulfapyridine administration due to the potential for falsely elevated results   Calcium 09/21/2020 9 0  8 3 - 10 1 mg/dL Final    AST 09/21/2020 23  5 - 45 U/L Final    Specimen collection should occur prior to Sulfasalazine administration due to the potential for falsely depressed results   ALT 09/21/2020 35  12 - 78 U/L Final    Specimen collection should occur prior to Sulfasalazine and/or Sulfapyridine administration due to the potential for falsely depressed results       Alkaline Phosphatase 09/21/2020 94  46 - 116 U/L Final    Total Protein 09/21/2020 7 7  6 4 - 8 2 g/dL Final    Albumin 09/21/2020 4 4  3 5 - 5 0 g/dL Final    Total Bilirubin 09/21/2020 1 41* 0 20 - 1 00 mg/dL Final    Use of this assay is not recommended for patients undergoing treatment with eltrombopag due to the potential for falsely elevated results   eGFR 09/21/2020 82  ml/min/1 73sq m Final    Prostate Specific Antigen Total 09/21/2020 0 8  0 0 - 4 0 ng/mL Final    Roche ECLIA methodology  According to the American U[employer] Association, Serum PSA should  decrease and remain at undetectable levels after radical  prostatectomy  The AUA defines biochemical recurrence as an initial  PSA value 0 2 ng/mL or greater followed by a subsequent confirmatory  PSA value 0 2 ng/mL or greater  Values obtained with different assay methods or kits cannot be used  interchangeably  Results cannot be interpreted as absolute evidence  of the presence or absence of malignant disease   PSA, Free 09/21/2020 0 22  N/A ng/mL Final    Roche ECLIA methodology   PSA, Free Pct 09/21/2020 27 5  % Final    The table below lists the probability of prostate cancer for  men with non-suspicious JACKSON results and total PSA between  4 and 10 ng/mL, by patient age Timothy Rhodes, [facility],  [id number])  % Free PSA       50-64 yr        65-75 yr                    0 00-10 00%        56%             55%                   10 01-15 00%        24%             35%                   15 01-20 00%        17%             23%                   20 01-25 00%        10%             20%                        >25 00%         5%              9%  Please note:  Jackie et al did not make specific                recommendations regarding the use of                percent free PSA for any other population                of men  Appointment on 08/28/2020   Component Date Value Ref Range Status    C1 Esterase Inhibitor 08/28/2020 32  21 - 39 mg/dL Final    C1 Est Inhib  Funct   08/28/2020 87  %mean normal Final                                      Abnormal       <41                                    Equivocal  41 - 67                                    Normal         >67    C4, COMPLEMENT 08/28/2020 23 0  10 0 - 40 0 mg/dL Final    WBC 08/28/2020 5 41  4 31 - 10 16 Thousand/uL Final    RBC 08/28/2020 4 56  3 88 - 5 62 Million/uL Final    Hemoglobin 08/28/2020 14 4  12 0 - 17 0 g/dL Final    Hematocrit 08/28/2020 43 0  36 5 - 49 3 % Final    MCV 08/28/2020 94  82 - 98 fL Final    MCH 08/28/2020 31 6  26 8 - 34 3 pg Final    MCHC 08/28/2020 33 5  31 4 - 37 4 g/dL Final    RDW 08/28/2020 12 2  11 6 - 15 1 % Final    MPV 08/28/2020 11 8  8 9 - 12 7 fL Final    Platelets 55/49/0349 193  149 - 390 Thousands/uL Final    nRBC 08/28/2020 0  /100 WBCs Final    Neutrophils Relative 08/28/2020 67  43 - 75 % Final    Immat GRANS % 08/28/2020 0  0 - 2 % Final    Lymphocytes Relative 08/28/2020 21  14 - 44 % Final    Monocytes Relative 08/28/2020 9  4 - 12 % Final    Eosinophils Relative 08/28/2020 2  0 - 6 % Final    Basophils Relative 08/28/2020 1  0 - 1 % Final    Neutrophils Absolute 08/28/2020 3 61  1 85 - 7 62 Thousands/µL Final    Immature Grans Absolute 08/28/2020 0 01  0 00 - 0 20 Thousand/uL Final    Lymphocytes Absolute 08/28/2020 1 14  0 60 - 4 47 Thousands/µL Final    Monocytes Absolute 08/28/2020 0 48  0 17 - 1 22 Thousand/µL Final    Eosinophils Absolute 08/28/2020 0 13  0 00 - 0 61 Thousand/µL Final    Basophils Absolute 08/28/2020 0 04  0 00 - 0 10 Thousands/µL Final    Miscellaneous Lab Test Result 08/28/2020 SEE WRITTEN REPORT   Final    Miscellaneous Lab Test Result 08/28/2020 SEE WRITTEN REPORT   Final    A/G Ratio 08/28/2020 1 65  1 10 - 1 80 Final    Albumin Electrophoresis 08/28/2020 62 2  52 0 - 65 0 % Final    Albumin CONC 08/28/2020 4 79  3 50 - 5 00 g/dl Final    Alpha 1 08/28/2020 4 3  2 5 - 5 0 % Final    ALPHA 1 CONC 08/28/2020 0 33  0 10 - 0 40 g/dL Final    Alpha 2 08/28/2020 9 1  7 0 - 13 0 % Final    ALPHA 2 CONC 08/28/2020 0 70  0 40 - 1 20 g/dL Final    Beta-1 08/28/2020 5 9  5 0 - 13 0 % Final    BETA 1 CONC 08/28/2020 0 45  0 40 - 0 80 g/dL Final    Beta-2 08/28/2020 5 0  2 0 - 8 0 % Final    BETA 2 CONC 08/28/2020 0  39  0 20 - 0 50 g/dL Final    Gamma Globulin 08/28/2020 13 5  12 0 - 22 0 % Final    GAMMA CONC 08/28/2020 1 04  0 50 - 1 60 g/dL Final    Total Protein 08/28/2020 7 7  6 4 - 8 2 g/dL Final    SPEP Interpretation 08/28/2020 No monoclonal bands noted  Reviewed by: Naman Louise MD ([id number]) **Electronic Signature**   Final    Urine Protein 08/28/2020 6 0  2 0 - 17 5 mg/dL Final    Albumin ELP, Urine 08/28/2020 100 0  % Final    Alpha 1, Urine 08/28/2020 0 0  % Final    Alpha 2, Urine 08/28/2020 0 0  % Final    Beta, Urine 08/28/2020 0 0  % Final    Gamma Globulin, Urine 08/28/2020 0 0  % Final    UPEP Interp 08/28/2020 No monoclonal bands noted  Reviewed by: Naman Louise MD ([id number])  **Electronic Signature**   Final    Tryptase 08/28/2020 5 1  2 2 - 13 2 ug/L Final    TSH 3RD GENERATON 08/28/2020 1 100  0 358 - 3 740 uIU/mL Final    Using supplements with high doses of biotin 20 to more than 300 times greater than the adequate daily intake for adults of 30 mcg/day as established by the [place] of Medicine, can cause falsely depress results   IGA 08/28/2020 224 0  70 0 - 400 0 mg/dL Final    IGG 08/28/2020 1,090 0  700 0-1,600 0 mg/dL Final    IGM 08/28/2020 83 0  40 0 - 230 0 mg/dL Final   Appointment on 06/03/2020   Component Date Value Ref Range Status    Cholesterol 06/03/2020 198  50 - 200 mg/dL Final      Cholesterol:       Desirable         <200 mg/dl       Borderline         200-239 mg/dl       High              >239           Triglycerides 06/03/2020 137  <=150 mg/dL Final      Triglyceride:     Normal          <150 mg/dl     Borderline High 150-199 mg/dl     High            200-499 mg/dl        Very High       >499 mg/dl    Specimen collection should occur prior to N-Acetylcysteine or Metamizole administration due to the potential for falsely depressed results      HDL, Direct 06/03/2020 51  >=40 mg/dL Final      HDL Cholesterol:       Low     <41 mg/dL  Specimen collection should occur prior to Metamizole administration due to the potential for falsley depressed results   LDL Calculated 06/03/2020 120* 0 - 100 mg/dL Final      LDL Cholesterol:     Optimal           <100 mg/dl     Near Optimal      100-129 mg/dl     Above Optimal       Borderline High 130-159 mg/dl       High            160-189 mg/dl       Very High       >189 mg/dl         This screening LDL is a calculated result  It does not have the accuracy of the Direct Measured LDL in the monitoring of patients with hyperlipidemia and/or statin therapy  Direct Measure LDL (CUC997) must be ordered separately in these patients      Non-HDL-Chol (CHOL-HDL) 06/03/2020 147  mg/dl Final    WBC 06/03/2020 5 52  4 31 - 10 16 Thousand/uL Final    RBC 06/03/2020 4 55  3 88 - 5 62 Million/uL Final    Hemoglobin 06/03/2020 14 4  12 0 - 17 0 g/dL Final    Hematocrit 06/03/2020 42 4  36 5 - 49 3 % Final    MCV 06/03/2020 93  82 - 98 fL Final    MCH 06/03/2020 31 6  26 8 - 34 3 pg Final    MCHC 06/03/2020 34 0  31 4 - 37 4 g/dL Final    RDW 06/03/2020 12 4  11 6 - 15 1 % Final    MPV 06/03/2020 11 8  8 9 - 12 7 fL Final    Platelets 22/03/4212 180  149 - 390 Thousands/uL Final    nRBC 06/03/2020 0  /100 WBCs Final    Neutrophils Relative 06/03/2020 60  43 - 75 % Final    Immat GRANS % 06/03/2020 0  0 - 2 % Final    Lymphocytes Relative 06/03/2020 27  14 - 44 % Final    Monocytes Relative 06/03/2020 12  4 - 12 % Final    Eosinophils Relative 06/03/2020 0  0 - 6 % Final    Basophils Relative 06/03/2020 1  0 - 1 % Final    Neutrophils Absolute 06/03/2020 3 30  1 85 - 7 62 Thousands/µL Final    Immature Grans Absolute 06/03/2020 0 01  0 00 - 0 20 Thousand/uL Final    Lymphocytes Absolute 06/03/2020 1 47  0 60 - 4 47 Thousands/µL Final    Monocytes Absolute 06/03/2020 0 67  0 17 - 1 22 Thousand/µL Final    Eosinophils Absolute 06/03/2020 0 01  0 00 - 0 61 Thousand/µL Final    Basophils Absolute 06/03/2020 0 06  0 00 - 0 10 Thousands/µL Final    Sodium 06/03/2020 136  136 - 145 mmol/L Final    Potassium 06/03/2020 4 0  3 5 - 5 3 mmol/L Final    Chloride 06/03/2020 102  100 - 108 mmol/L Final    CO2 06/03/2020 30  21 - 32 mmol/L Final    ANION GAP 06/03/2020 4  4 - 13 mmol/L Final    BUN 06/03/2020 19  5 - 25 mg/dL Final    Creatinine 06/03/2020 1 04  0 60 - 1 30 mg/dL Final    Standardized to IDMS reference method    Glucose, Fasting 06/03/2020 112* 65 - 99 mg/dL Final      Specimen collection should occur prior to Sulfasalazine administration due to the potential for falsely depressed results  Specimen collection should occur prior to Sulfapyridine administration due to the potential for falsely elevated results   Calcium 06/03/2020 9 0  8 3 - 10 1 mg/dL Final    AST 06/03/2020 26  5 - 45 U/L Final      Specimen collection should occur prior to Sulfasalazine administration due to the potential for falsely depressed results   ALT 06/03/2020 44  12 - 78 U/L Final      Specimen collection should occur prior to Sulfasalazine and/or Sulfapyridine administration due to the potential for falsely depressed results   Alkaline Phosphatase 06/03/2020 87  46 - 116 U/L Final    Total Protein 06/03/2020 8 0  6 4 - 8 2 g/dL Final    Albumin 06/03/2020 4 4  3 5 - 5 0 g/dL Final    Total Bilirubin 06/03/2020 1 19* 0 20 - 1 00 mg/dL Final      Use of this assay is not recommended for patients undergoing treatment with eltrombopag due to the potential for falsely elevated results   eGFR 06/03/2020 74  ml/min/1 73sq m Final    Hemoglobin A1C 06/03/2020 5 9* Normal 3 8-5 6%; PreDiabetic 5 7-6 4%;  Diabetic >=6 5%; Glycemic control for adults with diabetes <7 0% % Final    EAG 06/03/2020 123  mg/dl Final   Appointment on 03/16/2020   Component Date Value Ref Range Status    Sodium 03/16/2020 139  136 - 145 mmol/L Final    Potassium 03/16/2020 4 1  3 5 - 5 3 mmol/L Final    Chloride 03/16/2020 105  100 - 108 mmol/L Final  CO2 03/16/2020 28  21 - 32 mmol/L Final    ANION GAP 03/16/2020 6  4 - 13 mmol/L Final    BUN 03/16/2020 16  5 - 25 mg/dL Final    Creatinine 03/16/2020 1 01  0 60 - 1 30 mg/dL Final    Standardized to IDMS reference method    Glucose, Fasting 03/16/2020 130* 65 - 99 mg/dL Final      Specimen collection should occur prior to Sulfasalazine administration due to the potential for falsely depressed results  Specimen collection should occur prior to Sulfapyridine administration due to the potential for falsely elevated results   Calcium 03/16/2020 9 1  8 3 - 10 1 mg/dL Final    AST 03/16/2020 22  5 - 45 U/L Final      Specimen collection should occur prior to Sulfasalazine administration due to the potential for falsely depressed results   ALT 03/16/2020 37  12 - 78 U/L Final      Specimen collection should occur prior to Sulfasalazine and/or Sulfapyridine administration due to the potential for falsely depressed results   Alkaline Phosphatase 03/16/2020 92  46 - 116 U/L Final    Total Protein 03/16/2020 8 0  6 4 - 8 2 g/dL Final    Albumin 03/16/2020 4 3  3 5 - 5 0 g/dL Final    Total Bilirubin 03/16/2020 1 00  0 20 - 1 00 mg/dL Final    eGFR 03/16/2020 77  ml/min/1 73sq m Final   Office Visit on 09/19/2019   Component Date Value Ref Range Status    OTHER 09/19/2019    Final    9/19/2019   Appointment on 09/12/2019   Component Date Value Ref Range Status    Cholesterol 09/12/2019 197  50 - 200 mg/dL Final      Cholesterol:       Desirable         <200 mg/dl       Borderline         200-239 mg/dl       High              >239           Triglycerides 09/12/2019 124  <=150 mg/dL Final      Triglyceride:     Normal          <150 mg/dl     Borderline High 150-199 mg/dl     High            200-499 mg/dl        Very High       >499 mg/dl    Specimen collection should occur prior to N-Acetylcysteine or Metamizole administration due to the potential for falsely depressed results      HDL, Direct 09/12/2019 50  40 - 60 mg/dL Final      HDL Cholesterol:       High    >60 mg/dL       Low     <41 mg/dL  Specimen collection should occur prior to Metamizole administration due to the potential for falsley depressed results   LDL Calculated 09/12/2019 122* 0 - 100 mg/dL Final      LDL Cholesterol:     Optimal           <100 mg/dl     Near Optimal      100-129 mg/dl     Above Optimal       Borderline High 130-159 mg/dl       High            160-189 mg/dl       Very High       >189 mg/dl         This screening LDL is a calculated result  It does not have the accuracy of the Direct Measured LDL in the monitoring of patients with hyperlipidemia and/or statin therapy  Direct Measure LDL ([id number]) must be ordered separately in these patients      Non-HDL-Chol (CHOL-HDL) 09/12/2019 147  mg/dl Final    WBC 09/12/2019 4 64  4 31 - 10 16 Thousand/uL Final    RBC 09/12/2019 4 79  3 88 - 5 62 Million/uL Final    Hemoglobin 09/12/2019 14 6  12 0 - 17 0 g/dL Final    Hematocrit 09/12/2019 44 2  36 5 - 49 3 % Final    MCV 09/12/2019 92  82 - 98 fL Final    MCH 09/12/2019 30 5  26 8 - 34 3 pg Final    MCHC 09/12/2019 33 0  31 4 - 37 4 g/dL Final    RDW 09/12/2019 12 4  11 6 - 15 1 % Final    MPV 09/12/2019 11 6  8 9 - 12 7 fL Final    Platelets 17/32/9265 195  149 - 390 Thousands/uL Final    nRBC 09/12/2019 0  /100 WBCs Final    Neutrophils Relative 09/12/2019 47  43 - 75 % Final    Immat GRANS % 09/12/2019 0  0 - 2 % Final    Lymphocytes Relative 09/12/2019 34  14 - 44 % Final    Monocytes Relative 09/12/2019 13* 4 - 12 % Final    Eosinophils Relative 09/12/2019 5  0 - 6 % Final    Basophils Relative 09/12/2019 1  0 - 1 % Final    Neutrophils Absolute 09/12/2019 2 20  1 85 - 7 62 Thousands/µL Final    Immature Grans Absolute 09/12/2019 0 01  0 00 - 0 20 Thousand/uL Final    Lymphocytes Absolute 09/12/2019 1 59  0 60 - 4 47 Thousands/µL Final    Monocytes Absolute 09/12/2019 0 59  0 17 - 1 22 Thousand/µL Final    Eosinophils Absolute 09/12/2019 0 21  0 00 - 0 61 Thousand/µL Final    Basophils Absolute 09/12/2019 0 04  0 00 - 0 10 Thousands/µL Final    Sodium 09/12/2019 138  136 - 145 mmol/L Final    Potassium 09/12/2019 4 4  3 5 - 5 3 mmol/L Final    Chloride 09/12/2019 103  100 - 108 mmol/L Final    CO2 09/12/2019 27  21 - 32 mmol/L Final    ANION GAP 09/12/2019 8  4 - 13 mmol/L Final    BUN 09/12/2019 17  5 - 25 mg/dL Final    Creatinine 09/12/2019 1 07  0 60 - 1 30 mg/dL Final    Standardized to IDMS reference method    Glucose, Fasting 09/12/2019 118* 65 - 99 mg/dL Final      Specimen collection should occur prior to Sulfasalazine administration due to the potential for falsely depressed results  Specimen collection should occur prior to Sulfapyridine administration due to the potential for falsely elevated results   Calcium 09/12/2019 9 2  8 3 - 10 1 mg/dL Final    AST 09/12/2019 23  5 - 45 U/L Final      Specimen collection should occur prior to Sulfasalazine administration due to the potential for falsely depressed results   ALT 09/12/2019 37  12 - 78 U/L Final      Specimen collection should occur prior to Sulfasalazine and/or Sulfapyridine administration due to the potential for falsely depressed results   Alkaline Phosphatase 09/12/2019 87  46 - 116 U/L Final    Total Protein 09/12/2019 7 9  6 4 - 8 2 g/dL Final    Albumin 09/12/2019 5 0  3 5 - 5 0 g/dL Final    Total Bilirubin 09/12/2019 1 52* 0 20 - 1 00 mg/dL Final    eGFR 09/12/2019 72  ml/min/1 73sq m Final    TSH 3RD GENERATON 09/12/2019 1 130  0 358 - 3 740 uIU/mL Final      Using supplements with high doses of biotin 20 to more than 300 times greater than the adequate daily intake for adults of 30 mcg/day as established by the [place] of Medicine, can cause falsely depress results   Prostate Specific Antigen Total 09/12/2019 1 1  0 0 - 4 0 ng/mL Final    Roche ECLIA methodology    According to the [address] [place] [address] Urological Association, Serum PSA should  decrease and remain at undetectable levels after radical  prostatectomy  The AUA defines biochemical recurrence as an initial  PSA value 0 2 ng/mL or greater followed by a subsequent confirmatory  PSA value 0 2 ng/mL or greater  Values obtained with different assay methods or kits cannot be used  interchangeably  Results cannot be interpreted as absolute evidence  of the presence or absence of malignant disease   PSA, Free 09/12/2019 0 44  N/A ng/mL Final    Roche ECLIA methodology   PSA, Free Pct 09/12/2019 40 0  % Final    The table below lists the probability of prostate cancer for  men with non-suspicious JACKSON results and total PSA between  4 and 10 ng/mL, by patient age Kapil Smith, [facility],  354:2020)  % Free PSA       50-64 yr        65-75 yr                    0 00-10 00%        56%             55%                   10 01-15 00%        24%             35%                   15 01-20 00%        17%             23%                   20 01-25 00%        10%             20%                        >25 00%         5%              9%  Please note:  Jackie et al did not make specific                recommendations regarding the use of                percent free PSA for any other population                of men  Physical Exam  Constitutional:       Appearance: He is well-developed  HENT:      Right Ear: Tympanic membrane and ear canal normal       Left Ear: Tympanic membrane and ear canal normal    Neck:      Musculoskeletal: Normal range of motion and neck supple  Cardiovascular:      Rate and Rhythm: Normal rate and regular rhythm  Pulses: No decreased pulses  Heart sounds: Normal heart sounds  Pulmonary:      Effort: Pulmonary effort is normal  No respiratory distress  Breath sounds: Normal breath sounds  No wheezing or rales  Musculoskeletal: Normal range of motion  Skin:     General: Skin is warm and dry  Neurological:      Mental Status: He is alert and oriented to person, place, and time  Cranial Nerves: No cranial nerve deficit  Deep Tendon Reflexes: Reflexes are normal and symmetric  Psychiatric:         Behavior: Behavior normal          Thought Content:  Thought content normal          Judgment: Judgment normal

## 2020-12-20 DIAGNOSIS — I10 ESSENTIAL HYPERTENSION: ICD-10-CM

## 2020-12-21 DIAGNOSIS — I10 ESSENTIAL HYPERTENSION: ICD-10-CM

## 2020-12-21 RX ORDER — HYDROCHLOROTHIAZIDE 12.5 MG/1
12.5 TABLET ORAL DAILY
Qty: 90 TABLET | Refills: 0 | Status: SHIPPED | OUTPATIENT
Start: 2020-12-21 | End: 2021-06-09

## 2020-12-21 RX ORDER — HYDROCHLOROTHIAZIDE 12.5 MG/1
TABLET ORAL
Qty: 90 TABLET | Refills: 0 | Status: SHIPPED | OUTPATIENT
Start: 2020-12-21 | End: 2020-12-21 | Stop reason: SDUPTHER

## 2020-12-28 ENCOUNTER — LAB (OUTPATIENT)
Dept: LAB | Facility: HOSPITAL | Age: 65
End: 2020-12-28
Payer: COMMERCIAL

## 2020-12-28 DIAGNOSIS — I10 ESSENTIAL HYPERTENSION: ICD-10-CM

## 2020-12-28 DIAGNOSIS — E78.49 OTHER HYPERLIPIDEMIA: ICD-10-CM

## 2020-12-28 LAB
ALBUMIN SERPL BCP-MCNC: 4.6 G/DL (ref 3.5–5)
ALP SERPL-CCNC: 94 U/L (ref 46–116)
ALT SERPL W P-5'-P-CCNC: 39 U/L (ref 12–78)
ANION GAP SERPL CALCULATED.3IONS-SCNC: 3 MMOL/L (ref 4–13)
AST SERPL W P-5'-P-CCNC: 24 U/L (ref 5–45)
BILIRUB SERPL-MCNC: 1.24 MG/DL (ref 0.2–1)
BUN SERPL-MCNC: 16 MG/DL (ref 5–25)
CALCIUM SERPL-MCNC: 9.1 MG/DL (ref 8.3–10.1)
CHLORIDE SERPL-SCNC: 104 MMOL/L (ref 100–108)
CHOLEST SERPL-MCNC: 197 MG/DL (ref 50–200)
CO2 SERPL-SCNC: 32 MMOL/L (ref 21–32)
CREAT SERPL-MCNC: 0.99 MG/DL (ref 0.6–1.3)
GFR SERPL CREATININE-BSD FRML MDRD: 78 ML/MIN/1.73SQ M
GLUCOSE P FAST SERPL-MCNC: 112 MG/DL (ref 65–99)
HDLC SERPL-MCNC: 52 MG/DL
LDLC SERPL CALC-MCNC: 116 MG/DL (ref 0–100)
NONHDLC SERPL-MCNC: 145 MG/DL
POTASSIUM SERPL-SCNC: 4 MMOL/L (ref 3.5–5.3)
PROT SERPL-MCNC: 8 G/DL (ref 6.4–8.2)
SODIUM SERPL-SCNC: 139 MMOL/L (ref 136–145)
TRIGL SERPL-MCNC: 147 MG/DL

## 2020-12-28 PROCEDURE — 80053 COMPREHEN METABOLIC PANEL: CPT

## 2020-12-28 PROCEDURE — 36415 COLL VENOUS BLD VENIPUNCTURE: CPT

## 2020-12-28 PROCEDURE — 80061 LIPID PANEL: CPT

## 2021-01-08 ENCOUNTER — TELEPHONE (OUTPATIENT)
Dept: GASTROENTEROLOGY | Facility: CLINIC | Age: 66
End: 2021-01-08

## 2021-01-08 ENCOUNTER — OFFICE VISIT (OUTPATIENT)
Dept: FAMILY MEDICINE CLINIC | Facility: CLINIC | Age: 66
End: 2021-01-08
Payer: COMMERCIAL

## 2021-01-08 VITALS
HEART RATE: 74 BPM | DIASTOLIC BLOOD PRESSURE: 60 MMHG | WEIGHT: 161.5 LBS | SYSTOLIC BLOOD PRESSURE: 136 MMHG | RESPIRATION RATE: 16 BRPM | BODY MASS INDEX: 25.35 KG/M2 | TEMPERATURE: 97.7 F | HEIGHT: 67 IN

## 2021-01-08 DIAGNOSIS — T78.3XXD ANGIOEDEMA, SUBSEQUENT ENCOUNTER: ICD-10-CM

## 2021-01-08 DIAGNOSIS — I10 ESSENTIAL HYPERTENSION: ICD-10-CM

## 2021-01-08 DIAGNOSIS — R73.9 ELEVATED BLOOD SUGAR: ICD-10-CM

## 2021-01-08 DIAGNOSIS — E78.49 OTHER HYPERLIPIDEMIA: ICD-10-CM

## 2021-01-08 DIAGNOSIS — Z12.11 ENCOUNTER FOR SCREENING FOR MALIGNANT NEOPLASM OF COLON: Primary | ICD-10-CM

## 2021-01-08 PROCEDURE — 99214 OFFICE O/P EST MOD 30 MIN: CPT | Performed by: PHYSICIAN ASSISTANT

## 2021-01-08 NOTE — TELEPHONE ENCOUNTER
I returned call to [facility], she was not available (with a patient)  I left message that our office will take responsibility to contact patient regarding recall colonoscopy

## 2021-01-08 NOTE — TELEPHONE ENCOUNTER
Jeff Hernandez from [address] left  mssg stating their records indicate Pt has a 5-yr colon recall  Pt reports Insurance is not accepting that  Req CB [id number] for Jeff Hernandez to review recall

## 2021-01-08 NOTE — PROGRESS NOTES
Assessment/Plan:    1  Essential hypertension    - on HCTZ, well controlled with diet and exercise, no complaints     2  Other hyperlipidemia    - controlling well with diet, will repeat in 6 months, if stable will switch to yearly    3  Angioedema    - saw Dr Clarke Smoke, had allergy testing, thought to be hereditary, controlled with Allegra    4  Elevated Blood sugar    - will monitor A1C going forward every 6 months, c/w diet and exercise    5  Colon polyps    - due for colonoscopy now, will follow up with [facility]     rto 6 months with labs prior         Subjective:   Chief Complaint   Patient presents with    Hyperlipidemia    Hypertension    Vitamin D Deficiency      Patient ID: Anya Barerto  is a 77 y o  male  Patient here for check BP  Monitoring at home, getting 115/71, 130/80, 120/70  Has been more aware of diet, smaller portions, more veggies, bicycling  Has no complaints        The following portions of the patient's history were reviewed and updated as appropriate: allergies, current medications, past family history, past medical history, past social history, past surgical history and problem list     Past Medical History:   Diagnosis Date    Adjustment disorder     last assessed - 25Mar2014    Allergic rhinitis 02/06/2012    Cubital tunnel syndrome 04/20/2010    last assessed - 13Apr2012    Dermatitis 10/17/2011    last assessed - [id number]    Electrolyte or fluid disorder 01/24/2011    Fatty liver 10/06/2008    Gout     Hypertension     Lichen planus 70/27/9087    Varicella     Wears glasses      Past Surgical History:   Procedure Laterality Date    COLONOSCOPY      CYSTOSCOPY W/ URETEROSCOPY W/ LITHOTRIPSY      MOUTH SURGERY      duct blockage after injury-inside cheek     VAS DUPLEX (HISTORICAL)  (DUPLEX EXT VEINS COMPLETE BL)       Family History   Problem Relation Age of Onset    Cancer Mother     Diabetes Mother     Arthritis Father     Heart failure Father Congesrive heart failure    Hypertension Father     Prostate cancer Father     COPD Sister     Substance Abuse Neg Hx     Mental illness Neg Hx     Alcohol abuse Neg Hx      Social History     Socioeconomic History    Marital status: Single     Spouse name: Not on file    Number of children: 0    Years of education: Not on file    Highest education level: Not on file   Occupational History    Occupation: [employer]      Comment: retired     Occupation: OR Tech      Employer: [employer] EM[employer]YEES     Comment: retired    Social Needs    Financial resource strain: Not on file    Food insecurity     Worry: Not on file     Inability: Not on file    Transportation needs     Medical: Not on file     Non-medical: Not on file   Tobacco Use    Smoking status: Never Smoker    Smokeless tobacco: Never Used   Substance and Sexual Activity    Alcohol use: Yes     Comment: social alcohol use, drinks 1 glass of wine twice a week    Drug use: Never    Sexual activity: Not on file   Lifestyle    Physical activity     Days per week: 7 days     Minutes per session: 40 min    Stress: Not on file   Relationships    Social connections     Talks on phone: Not on file     Gets together: Not on file     Attends Latter-day service: Not on file     Active member of club or organization: Not on file     Attends meetings of clubs or organizations: Not on file     Relationship status: Not on file    Intimate partner violence     Fear of current or ex partner: Not on file     Emotionally abused: Not on file     Physically abused: Not on file     Forced sexual activity: Not on file   Other Topics Concern    Not on file   Social History Narrative    Drinks coffee - drinks 2 cups a day    Has  Smoke detectors    Uses safety equipment - seatbelts            Who lives in your home: alone    What type of home do you live in: Single house    Age of your home: 59 yrs old     How long have you been living there: 20 yrs    Type of heat: Baseboard    Type of fuel: Oil and Gas    What type of annabelle is in your bedroom: Hardwood floor    Do you have the following in or near your home:    Air products: Central air and Dehumidifier    Pests: None    Pets: None    Are pets allowed in bedroom: N/A    Open fields, wooded areas nearby: Open fields and Wooded areas    Basement: Damp, Musty and Unfinished    Exposure to second hand smoke: No        Habits:    Caffeine: coffee 2 cups daily-hot tea occasionally     Chocolate: occasionally     Other:       Current Outpatient Medications:     Calcium Carb-Cholecalciferol (CALCIUM/VITAMIN D PO), Take by mouth, Disp: , Rfl:     fexofenadine (ALLEGRA) 180 MG tablet, Take 1 tablet (180 mg total) by mouth daily IN AM, Disp: 30 tablet, Rfl: 11    hydrochlorothiazide (HYDRODIURIL) 12 5 mg tablet, Take 1 tablet (12 5 mg total) by mouth daily, Disp: 90 tablet, Rfl: 0    Misc Natural Products (SAW PALMETTO) CAPS, Take 3 capsules by mouth daily, Disp: , Rfl:     Multiple Vitamins-Minerals (OCUVITE EXTRA) TABS, Take 1 tablet by mouth daily, Disp: , Rfl:     Review of Systems   Constitutional: Negative  Eyes: Negative  Respiratory: Negative  Cardiovascular: Negative  Neurological: Negative  Objective:    Vitals:    01/08/21 0843   BP: 136/60   BP Location: Left arm   Patient Position: Sitting   Cuff Size: Standard   Pulse: 74   Resp: 16   Temp: 97 7 °F (36 5 °C)   TempSrc: Oral   Weight: 73 3 kg (161 lb 8 oz)   Height: 5' 7" (1 702 m)        Physical Exam  Constitutional:       Appearance: Normal appearance  He is well-developed and normal weight  Neck:      Musculoskeletal: Neck supple  Vascular: No carotid bruit  Cardiovascular:      Rate and Rhythm: Normal rate and regular rhythm  Pulses: Normal pulses  Heart sounds: Normal heart sounds  Pulmonary:      Effort: Pulmonary effort is normal       Breath sounds: Normal breath sounds     Musculoskeletal:      Right lower leg: No edema  Left lower leg: No edema  Skin:     General: Skin is warm  Neurological:      General: No focal deficit present  Mental Status: He is alert and oriented to person, place, and time  Psychiatric:         Mood and Affect: Mood normal          Behavior: Behavior normal          Thought Content: Thought content normal          Judgment: Judgment normal              BMI Counseling: Body mass index is 25 29 kg/m²  The BMI is above normal  Nutrition recommendations include reducing portion sizes

## 2021-01-12 NOTE — TELEPHONE ENCOUNTER
Mckayla-Dr Lua Breeding wants pt to have cologuard done instead of recall colon-wants them done every 3 three years after that

## 2021-01-12 NOTE — TELEPHONE ENCOUNTER
Spoke to pt; he is agreeable to go forward with cologuard; order was faxed successfully to [facility]; order form scanned into chart

## 2021-01-18 NOTE — TELEPHONE ENCOUNTER
Pt asks to janna vicente/ Mckayla about Cologuard - found out Ins (Medicare) will not pay for Cologuard/he would have to pay out of pocket; [phone]

## 2021-01-19 NOTE — TELEPHONE ENCOUNTER
Per nursing, we should reach out to pt to follow up in a few weeks to see if he can schedule with us

## 2021-01-19 NOTE — TELEPHONE ENCOUNTER
Pt called back to let us know his friend and transporter may not be available for a few months, in the mean time he may need to have colonoscopy at [facility] near [facility] instead as it is closer to his   Pt states we are his preferred choice and will call us when/if he can schedule with us  He is very appreciative for all our efforts

## 2021-01-19 NOTE — TELEPHONE ENCOUNTER
Spoke to pt; transferred him to Kennard Shown regarding benefits; he is ok to be scheduled and will call back to do so once he figures out a ride; I provided him with 2 local agencies for transport and care post op

## 2021-06-09 DIAGNOSIS — I10 ESSENTIAL HYPERTENSION: ICD-10-CM

## 2021-06-09 RX ORDER — HYDROCHLOROTHIAZIDE 12.5 MG/1
TABLET ORAL
Qty: 90 TABLET | Refills: 0 | Status: SHIPPED | OUTPATIENT
Start: 2021-06-09 | End: 2021-09-07

## 2021-07-01 ENCOUNTER — TELEPHONE (OUTPATIENT)
Dept: FAMILY MEDICINE CLINIC | Facility: CLINIC | Age: 66
End: 2021-07-01

## 2021-07-01 DIAGNOSIS — Z12.11 SCREENING FOR MALIGNANT NEOPLASM OF COLON: Primary | ICD-10-CM

## 2021-07-01 NOTE — TELEPHONE ENCOUNTER
Pt called back and said that he needed an order to have a regular screening colonoscopy done sent to this place, because it is time for him to have another one  Can you please enter an order for a screening colonoscopy? Beata Gutierrez - can you do this for Hillary Garay?

## 2021-07-01 NOTE — TELEPHONE ENCOUNTER
Please put a new order in for patient's referral to GI  He cannot see Dr Frankie Tubbs due to transportation issues  He would like to be referred to Dr Lillie Tolentino at [facility]  He would like the order faxed to [phone] once entered

## 2021-07-01 NOTE — TELEPHONE ENCOUNTER
Received a call from Cuauhtemoc Fields at [employer]  She received an order for a colonoscopy on this patient  She said she called the patient and he was confused as to what doctor and where this practice was  I spoke with patient  He is going to call the phone number he has and find out the name of the doctor, the practice name, the address and fax number  He will call us back with the information and we will enter the correct information on the ambulatory referral for his colonoscopy

## 2021-07-06 NOTE — TELEPHONE ENCOUNTER
Patient called back and gave me the following information:    Fax [phone] [facility]  [address] [place] [address]    I faxed the colonoscopy script to them at the above number

## 2021-07-09 ENCOUNTER — LAB (OUTPATIENT)
Dept: LAB | Facility: HOSPITAL | Age: 66
End: 2021-07-09
Payer: COMMERCIAL

## 2021-07-09 DIAGNOSIS — E78.49 OTHER HYPERLIPIDEMIA: ICD-10-CM

## 2021-07-09 LAB
ALBUMIN SERPL BCP-MCNC: 4.2 G/DL (ref 3.5–5)
ALP SERPL-CCNC: 74 U/L (ref 46–116)
ALT SERPL W P-5'-P-CCNC: 42 U/L (ref 12–78)
ANION GAP SERPL CALCULATED.3IONS-SCNC: 3 MMOL/L (ref 4–13)
AST SERPL W P-5'-P-CCNC: 17 U/L (ref 5–45)
BILIRUB SERPL-MCNC: 1.94 MG/DL (ref 0.2–1)
BUN SERPL-MCNC: 15 MG/DL (ref 5–25)
CALCIUM SERPL-MCNC: 9.1 MG/DL (ref 8.3–10.1)
CHLORIDE SERPL-SCNC: 99 MMOL/L (ref 100–108)
CHOLEST SERPL-MCNC: 171 MG/DL (ref 50–200)
CO2 SERPL-SCNC: 30 MMOL/L (ref 21–32)
CREAT SERPL-MCNC: 0.99 MG/DL (ref 0.6–1.3)
EST. AVERAGE GLUCOSE BLD GHB EST-MCNC: 114 MG/DL
GFR SERPL CREATININE-BSD FRML MDRD: 78 ML/MIN/1.73SQ M
GLUCOSE P FAST SERPL-MCNC: 99 MG/DL (ref 65–99)
HBA1C MFR BLD: 5.6 %
HDLC SERPL-MCNC: 48 MG/DL
LDLC SERPL CALC-MCNC: 102 MG/DL (ref 0–100)
POTASSIUM SERPL-SCNC: 3.6 MMOL/L (ref 3.5–5.3)
PROT SERPL-MCNC: 7.5 G/DL (ref 6.4–8.2)
SODIUM SERPL-SCNC: 132 MMOL/L (ref 136–145)
TRIGL SERPL-MCNC: 103 MG/DL

## 2021-07-09 PROCEDURE — 80061 LIPID PANEL: CPT

## 2021-07-09 PROCEDURE — 36415 COLL VENOUS BLD VENIPUNCTURE: CPT | Performed by: PHYSICIAN ASSISTANT

## 2021-07-09 PROCEDURE — 83036 HEMOGLOBIN GLYCOSYLATED A1C: CPT | Performed by: PHYSICIAN ASSISTANT

## 2021-07-09 PROCEDURE — 80053 COMPREHEN METABOLIC PANEL: CPT

## 2021-07-16 ENCOUNTER — OFFICE VISIT (OUTPATIENT)
Dept: FAMILY MEDICINE CLINIC | Facility: CLINIC | Age: 66
End: 2021-07-16
Payer: COMMERCIAL

## 2021-07-16 VITALS
OXYGEN SATURATION: 98 % | DIASTOLIC BLOOD PRESSURE: 70 MMHG | RESPIRATION RATE: 16 BRPM | BODY MASS INDEX: 25.58 KG/M2 | WEIGHT: 163 LBS | HEIGHT: 67 IN | HEART RATE: 76 BPM | TEMPERATURE: 98.3 F | SYSTOLIC BLOOD PRESSURE: 130 MMHG

## 2021-07-16 DIAGNOSIS — I10 ESSENTIAL HYPERTENSION: Primary | ICD-10-CM

## 2021-07-16 DIAGNOSIS — Z12.5 PROSTATE CANCER SCREENING: ICD-10-CM

## 2021-07-16 DIAGNOSIS — R73.9 ELEVATED BLOOD SUGAR: ICD-10-CM

## 2021-07-16 DIAGNOSIS — E78.49 OTHER HYPERLIPIDEMIA: ICD-10-CM

## 2021-07-16 PROBLEM — R73.03 PREDIABETES: Status: RESOLVED | Noted: 2020-06-11 | Resolved: 2021-07-16

## 2021-07-16 PROCEDURE — 99214 OFFICE O/P EST MOD 30 MIN: CPT | Performed by: PHYSICIAN ASSISTANT

## 2021-07-16 PROCEDURE — G0439 PPPS, SUBSEQ VISIT: HCPCS | Performed by: PHYSICIAN ASSISTANT

## 2021-07-16 NOTE — PROGRESS NOTES
Assessment/Plan:    1  Essential hypertension     - on HCTZ, well controlled with diet and exercise, no complaints     2  Other hyperlipidemia     - controlling well with diet, !! will repeat in 6 months, if stable will switch to yearly     3  Angioedema     - saw Dr Artem Reis, had allergy testing, thought to be hereditary, controlled with Allegra     4  Elevated Blood sugar     -  Excellent 5 6%, will monitor A1C in 6 months, c/w diet and exercise, will consider yearly     5  Colon polyps     - due for colonoscopy now, Dr Mino Chapman 6 months with labs prior         Subjective:   Chief Complaint   Patient presents with    Follow-up      Patient ID: Terrie Willis  is a 77 y o  male  Patient here in follow up  Here ot review labs  No complaints at this time  Eating healthy, riding bike, walking  AC broken  Still working on colonoscopy        The following portions of the patient's history were reviewed and updated as appropriate: allergies, current medications, past family history, past medical history, past social history, past surgical history and problem list     Past Medical History:   Diagnosis Date    Adjustment disorder     last assessed - 25Mar2014    Allergic rhinitis 02/06/2012    Cubital tunnel syndrome 04/20/2010    last assessed - 13Apr2012    Dermatitis 10/17/2011    last assessed - 13Apr2012    Electrolyte or fluid disorder 01/24/2011    Fatty liver 10/06/2008    Gout     Hypertension     Lichen planus 76/98/0383    Varicella     Wears glasses      Past Surgical History:   Procedure Laterality Date    COLONOSCOPY      CYSTOSCOPY W/ URETEROSCOPY W/ LITHOTRIPSY      MOUTH SURGERY      duct blockage after injury-inside cheek     VAS DUPLEX (HISTORICAL)  (DUPLEX EXT VEINS COMPLETE BL)       Family History   Problem Relation Age of Onset    Cancer Mother     Diabetes Mother     Arthritis Father     Heart failure Father         Congesrive heart failure    Hypertension Father     Prostate cancer Father     COPD Sister     Substance Abuse Neg Hx     Mental illness Neg Hx     Alcohol abuse Neg Hx      Social History     Socioeconomic History    Marital status: Single     Spouse name: Not on file    Number of children: 0    Years of education: Not on file    Highest education level: Not on file   Occupational History    Occupation: [employer]      Comment: retired     Occupation: OR Tech      Employer: [address] [place] [address] [address] [employer]     Comment: retired    Tobacco Use    Smoking status: Never Smoker    Smokeless tobacco: Never Used   Vaping Use    Vaping Use: Never used   Substance and Sexual Activity    Alcohol use: Yes     Comment: social alcohol use, drinks 1 glass of wine twice a week    Drug use: Never    Sexual activity: Not on file   Other Topics Concern    Not on file   Social History Narrative    Drinks coffee - drinks 2 cups a day    Has  Smoke detectors    Uses safety equipment - seatbelts            Who lives in your home: alone    What type of home do you live in: Single house    Age of your home: 59 yrs old     How long have you been living there: 20 yrs    Type of heat: Baseboard    Type of fuel: Oil and Gas    What type of annabelle is in your bedroom: Hardwood floor    Do you have the following in or near your home:    Air products: Central air and Dehumidifier    Pests: None    Pets: None    Are pets allowed in bedroom: N/A    Open fields, wooded areas nearby: Open fields and Wooded areas    Basement: Damp, Musty and Unfinished    Exposure to second hand smoke: No        Habits:    Caffeine: coffee 2 cups daily-hot tea occasionally     Chocolate: occasionally     Other:     Social Determinants of Health     Financial Resource Strain:     Difficulty of Paying Living Expenses:    Food Insecurity:     Worried About Running Out of Food in the Last Year:     Ran Out of Food in the Last Year:    Transportation Needs:     Lack of Transportation (Medical):  Lack of Transportation (Non-Medical):    Physical Activity: Sufficiently Active    Days of Exercise per Week: 7 days    Minutes of Exercise per Session: 40 min   Stress:     Feeling of Stress :    Social Connections:     Frequency of Communication with Friends and Family:     Frequency of Social Gatherings with Friends and Family:     Attends Christianity Services:     Active Member of Clubs or Organizations:     Attends Club or Organization Meetings:     Marital Status:    Intimate Partner Violence:     Fear of Current or Ex-Partner:     Emotionally Abused:     Physically Abused:     Sexually Abused:        Current Outpatient Medications:     Calcium Carb-Cholecalciferol (CALCIUM/VITAMIN D PO), Take by mouth, Disp: , Rfl:     fexofenadine (ALLEGRA) 180 MG tablet, Take 1 tablet (180 mg total) by mouth daily IN AM, Disp: 30 tablet, Rfl: 11    hydrochlorothiazide (HYDRODIURIL) 12 5 mg tablet, TAKE 1 TABLET BY MOUTH EVERY DAY, Disp: 90 tablet, Rfl: 0    Misc Natural Products (SAW PALMETTO) CAPS, Take 3 capsules by mouth daily, Disp: , Rfl:     Multiple Vitamins-Minerals (OCUVITE EXTRA) TABS, Take 1 tablet by mouth daily (Patient not taking: Reported on 7/16/2021), Disp: , Rfl:     Review of Systems   Constitutional: Negative for chills and fever  HENT: Negative for ear pain and sore throat  Eyes: Negative for pain and visual disturbance  Respiratory: Negative for cough and shortness of breath  Cardiovascular: Negative for chest pain and palpitations  Gastrointestinal: Negative for abdominal pain and vomiting  Genitourinary: Negative for dysuria and hematuria  Musculoskeletal: Negative for arthralgias and back pain  Skin: Negative for color change and rash  Neurological: Negative for seizures and syncope  All other systems reviewed and are negative              Objective:    Vitals:    07/16/21 0753   BP: 160/80   BP Location: Left arm   Patient Position: Sitting Cuff Size: Adult   Pulse: 76   Resp: 16   Temp: 98 3 °F (36 8 °C)   TempSrc: Oral   SpO2: 98%   Weight: 73 9 kg (163 lb)   Height: 5' 7" (1 702 m)     BP Readings from Last 3 Encounters:   07/16/21 130/70   01/08/21 136/60   10/06/20 160/82        Physical Exam  Constitutional:       Appearance: Normal appearance  He is well-developed  Cardiovascular:      Rate and Rhythm: Normal rate and regular rhythm  Heart sounds: Normal heart sounds  Pulmonary:      Effort: Pulmonary effort is normal       Breath sounds: Normal breath sounds  Musculoskeletal:      Cervical back: Neck supple  Skin:     General: Skin is warm  Neurological:      General: No focal deficit present  Mental Status: He is alert and oriented to person, place, and time  Psychiatric:         Mood and Affect: Mood normal          Behavior: Behavior normal          Thought Content: Thought content normal          Judgment: Judgment normal          BMI Counseling: Body mass index is 25 53 kg/m²  The BMI is above normal  Nutrition recommendations include reducing portion sizes  Lab on 07/09/2021   Component Date Value Ref Range Status    Cholesterol 07/09/2021 171  50 - 200 mg/dL Final    Cholesterol:       Desirable         <200 mg/dl       Borderline         200-239 mg/dl       High              >239           Triglycerides 07/09/2021 103  <=150 mg/dL Final    Triglyceride:     Normal          <150 mg/dl     Borderline High 150-199 mg/dl     High            200-499 mg/dl        Very High       >499 mg/dl    Specimen collection should occur prior to N-Acetylcysteine or Metamizole administration due to the potential for falsely depressed results   HDL, Direct 07/09/2021 48  >=40 mg/dL Final    HDL Cholesterol:       Low     <41 mg/dL  Specimen collection should occur prior to Metamizole administration due to the potential for falsley depressed results      LDL Calculated 07/09/2021 102* 0 - 100 mg/dL Final    LDL Cholesterol:   Optimal           <100 mg/dl     Near Optimal      100-129 mg/dl     Above Optimal       Borderline High 130-159 mg/dl       High            160-189 mg/dl       Very High       >189 mg/dl         This screening LDL is a calculated result  It does not have the accuracy of the Direct Measured LDL in the monitoring of patients with hyperlipidemia and/or statin therapy  Direct Measure LDL ([id number]) must be ordered separately in these patients   Sodium 07/09/2021 132* 136 - 145 mmol/L Final    Potassium 07/09/2021 3 6  3 5 - 5 3 mmol/L Final    Chloride 07/09/2021 99* 100 - 108 mmol/L Final    CO2 07/09/2021 30  21 - 32 mmol/L Final    ANION GAP 07/09/2021 3* 4 - 13 mmol/L Final    BUN 07/09/2021 15  5 - 25 mg/dL Final    Creatinine 07/09/2021 0 99  0 60 - 1 30 mg/dL Final    Standardized to IDMS reference method    Glucose, Fasting 07/09/2021 99  65 - 99 mg/dL Final    Specimen collection should occur prior to Sulfasalazine administration due to the potential for falsely depressed results  Specimen collection should occur prior to Sulfapyridine administration due to the potential for falsely elevated results   Calcium 07/09/2021 9 1  8 3 - 10 1 mg/dL Final    AST 07/09/2021 17  5 - 45 U/L Final    Specimen collection should occur prior to Sulfasalazine administration due to the potential for falsely depressed results   ALT 07/09/2021 42  12 - 78 U/L Final    Specimen collection should occur prior to Sulfasalazine and/or Sulfapyridine administration due to the potential for falsely depressed results   Alkaline Phosphatase 07/09/2021 74  46 - 116 U/L Final    Total Protein 07/09/2021 7 5  6 4 - 8 2 g/dL Final    Albumin 07/09/2021 4 2  3 5 - 5 0 g/dL Final    Total Bilirubin 07/09/2021 1 94* 0 20 - 1 00 mg/dL Final    Use of this assay is not recommended for patients undergoing treatment with eltrombopag due to the potential for falsely elevated results      eGFR 07/09/2021 78 ml/min/1 73sq m Final   ]

## 2021-07-16 NOTE — PROGRESS NOTES
Assessment and Plan:     AWV - conducted     Preventive health issues were discussed with patient, and age appropriate screening tests were ordered as noted in patient's After Visit Summary  Personalized health advice and appropriate referrals for health education or preventive services given if needed, as noted in patient's After Visit Summary       History of Present Illness:     Patient presents for Medicare Annual Wellness visit    Patient Care Team:  Trev George PA-C as PCP - General (Family Medicine)  Kalpana Stout MD as PCP - [address] [place] [address] (RTE)     Problem List:     Patient Active Problem List   Diagnosis    Diverticulosis    Hyperlipidemia    Hypertension    Vitamin D deficiency    Healthcare maintenance    Elevated blood sugar    Prediabetes    Allergic conjunctivitis of both eyes    Allergic rhinitis due to house dust mite    Intrinsic atopic dermatitis    Chronic seasonal allergic rhinitis due to pollen    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    Angioedema      Past Medical and Surgical History:     Past Medical History:   Diagnosis Date    Adjustment disorder     last assessed - [id number]    Allergic rhinitis 02/06/2012    Cubital tunnel syndrome 04/20/2010    last assessed - [id number]    Dermatitis 10/17/2011    last assessed - [id number]    Electrolyte or fluid disorder 01/24/2011    Fatty liver 10/06/2008    Gout     Hypertension     Lichen planus 65/49/6910    Varicella     Wears glasses      Past Surgical History:   Procedure Laterality Date    COLONOSCOPY      CYSTOSCOPY W/ URETEROSCOPY W/ LITHOTRIPSY      MOUTH SURGERY      duct blockage after injury-inside cheek     VAS DUPLEX (HISTORICAL)  (DUPLEX EXT VEINS COMPLETE BL)        Family History:     Family History   Problem Relation Age of Onset    Cancer Mother     Diabetes Mother     Arthritis Father     Heart failure Father         Congesrive heart failure  Hypertension Father     Prostate cancer Father     COPD Sister     Substance Abuse Neg Hx     Mental illness Neg Hx     Alcohol abuse Neg Hx       Social History:     Social History     Socioeconomic History    Marital status: Single     Spouse name: None    Number of children: 0    Years of education: None    Highest education level: None   Occupational History    Occupation: [employer]      Comment: retired     Occupation: OR Tech      Employer: [address] [place] [address] [employer]     Comment: retired    Tobacco Use    Smoking status: Never Smoker    Smokeless tobacco: Never Used   Vaping Use    Vaping Use: Never used   Substance and Sexual Activity    Alcohol use: Yes     Comment: social alcohol use, drinks 1 glass of wine twice a week    Drug use: Never    Sexual activity: None   Other Topics Concern    None   Social History Narrative    Drinks coffee - drinks 2 cups a day    Has  Smoke detectors    Uses safety equipment - seatbelts            Who lives in your home: alone    What type of home do you live in: Single house    Age of your home: 59 yrs old     How long have you been living there: 20 yrs    Type of heat: Baseboard    Type of fuel: Oil and Gas    What type of annabelle is in your bedroom: Hardwood floor    Do you have the following in or near your home:    Air products: Central air and Dehumidifier    Pests: None    Pets: None    Are pets allowed in bedroom: N/A    Open fields, wooded areas nearby: Open fields and Wooded areas    Basement: Damp, Musty and Unfinished    Exposure to second hand smoke: No        Habits:    Caffeine: coffee 2 cups daily-hot tea occasionally     Chocolate: occasionally     Other:     Social Determinants of Health     Financial Resource Strain:     Difficulty of Paying Living Expenses:    Food Insecurity:     Worried About Running Out of Food in the Last Year:     Ran Out of Food in the Last Year:    Transportation Needs:     Lack of Transportation (Medical):  Lack of Transportation (Non-Medical):    Physical Activity: Sufficiently Active    Days of Exercise per Week: 7 days    Minutes of Exercise per Session: 40 min   Stress:     Feeling of Stress :    Social Connections:     Frequency of Communication with Friends and Family:     Frequency of Social Gatherings with Friends and Family:     Attends Christian Services:     Active Member of Clubs or Organizations:     Attends Club or Organization Meetings:     Marital Status:    Intimate Partner Violence:     Fear of Current or Ex-Partner:     Emotionally Abused:     Physically Abused:     Sexually Abused:       Medications and Allergies:     Current Outpatient Medications   Medication Sig Dispense Refill    Calcium Carb-Cholecalciferol (CALCIUM/VITAMIN D PO) Take by mouth      fexofenadine (ALLEGRA) 180 MG tablet Take 1 tablet (180 mg total) by mouth daily IN AM 30 tablet 11    hydrochlorothiazide (HYDRODIURIL) 12 5 mg tablet TAKE 1 TABLET BY MOUTH EVERY DAY 90 tablet 0    Misc Natural Products (SAW PALMETTO) CAPS Take 3 capsules by mouth daily      Multiple Vitamins-Minerals (OCUVITE EXTRA) TABS Take 1 tablet by mouth daily (Patient not taking: Reported on 7/16/2021)       No current facility-administered medications for this visit       Allergies   Allergen Reactions    Shellfish Allergy - Food Allergy Hives and GI Intolerance    Allopurinol      Other reaction(s): Reverse reaction    Aspirin Hives    Lisinopril Swelling    Other Allergic Rhinitis, Cough and Edema    Penicillins Hives    Valsartan Edema      Immunizations:     Immunization History   Administered Date(s) Administered    INFLUENZA 10/01/2015, 09/21/2016, 10/30/2017, 10/30/2017, 11/05/2018    Influenza Quadrivalent Preservative Free 3 years and older IM 10/01/2015    Influenza Quadrivalent, 6-35 Months IM 11/03/2017    Influenza Split High Dose Preservative Free IM 11/05/2018    Influenza, high dose seasonal 0 7 mL 09/19/2019, 09/29/2020    Influenza, seasonal, injectable 10/19/2010, 09/06/2012, 09/16/2013, 09/21/2016    Pneumococcal Conjugate 13-Valent 03/12/2019    Pneumococcal Polysaccharide PPV23 06/11/2020    SARS-CoV-2 / COVID-19 mRNA IM (Moderna) 03/24/2021, 04/21/2021    Tdap 01/24/2011    Zoster 04/01/2014    Zoster Vaccine Recombinant 04/01/2014      Health Maintenance:         Topic Date Due    Hepatitis C Screening  Never done    Colorectal Cancer Screening  01/12/2020         Topic Date Due    DTaP,Tdap,and Td Vaccines (2 - Td or Tdap) 01/24/2021    Influenza Vaccine (1) 09/01/2021      Medicare Health Risk Assessment: Wt 73 9 kg (163 lb)   BMI 25 53 kg/m²      Luna Sanchez is here for his Subsequent Wellness visit  Health Risk Assessment:   Patient rates overall health as excellent  Patient feels that their physical health rating is much better  Patient is very satisfied with their life  Eyesight was rated as same  Hearing was rated as same  Patient feels that their emotional and mental health rating is same  Patients states they are never, rarely angry  Patient states they are never, rarely unusually tired/fatigued  Pain experienced in the last 7 days has been none  Patient states that he has experienced no weight loss or gain in last 6 months  Depression Screening:   PHQ-2 Score: 2      Fall Risk Screening: In the past year, patient has experienced: history of falling in past year    Number of falls: 1  Injured during fall?: No    Feels unsteady when standing or walking?: No    Worried about falling?: No      Home Safety:  Patient does not have trouble with stairs inside or outside of their home  Patient has working smoke alarms and has working carbon monoxide detector  Home safety hazards include: none  Nutrition:   Current diet is Regular and Low Carb  Medications:   Patient is currently taking over-the-counter supplements   OTC medications include: see medication list  Patient is able to manage medications  Activities of Daily Living (ADLs)/Instrumental Activities of Daily Living (IADLs):   Walk and transfer into and out of bed and chair?: Yes  Dress and groom yourself?: Yes    Bathe or shower yourself?: Yes    Feed yourself? Yes  Do your laundry/housekeeping?: Yes  Manage your money, pay your bills and track your expenses?: Yes  Make your own meals?: Yes    Do your own shopping?: Yes    Previous Hospitalizations:   Any hospitalizations or ED visits within the last 12 months?: No      Advance Care Planning:   Living will: Yes    Durable POA for healthcare: Yes    Advanced directive: Yes    End of Life Decisions reviewed with patient: Yes    Provider agrees with end of life decisions: Yes      Cognitive Screening:   Provider or family/friend/caregiver concerned regarding cognition?: No    PREVENTIVE SCREENINGS      Cardiovascular Screening:    General: Screening Not Indicated and History Lipid Disorder      Diabetes Screening:     General: Screening Current      Colorectal Cancer Screening:     General: Screening Current      Prostate Cancer Screening:    General: Screening Current      Osteoporosis Screening:    General: Screening Not Indicated      Abdominal Aortic Aneurysm (AAA) Screening:    Risk factors include: age between 73-69 yo        Lung Cancer Screening:     General: Screening Not Indicated      Hepatitis C Screening:    General: Screening Not Indicated    Screening, Brief Intervention, and Referral to Treatment (SBIRT)    Screening  Typical number of drinks in a day: 1  Typical number of drinks in a week: 7  Interpretation: Low risk drinking behavior      Single Item Drug Screening:  How often have you used an illegal drug (including marijuana) or a prescription medication for non-medical reasons in the past year? never    Single Item Drug Screen Score: 0  Interpretation: Negative screen for possible drug use disorder    Brief Intervention  Alcohol & drug use screenings were reviewed  No concerns regarding substance use disorder identified  Other Counseling Topics:   Car/seat belt/driving safety, skin self-exam, sunscreen and calcium and vitamin D intake and regular weightbearing exercise         Trev George PA-C

## 2021-07-16 NOTE — PATIENT INSTRUCTIONS
Medicare Preventive Visit Patient Instructions  Thank you for completing your Welcome to Medicare Visit or Medicare Annual Wellness Visit today  Your next wellness visit will be due in one year (7/17/2022)  The screening/preventive services that you may require over the next 5-10 years are detailed below  Some tests may not apply to you based off risk factors and/or age  Screening tests ordered at today's visit but not completed yet may show as past due  Also, please note that scanned in results may not display below  Preventive Screenings:  Service Recommendations Previous Testing/Comments   Colorectal Cancer Screening  · Colonoscopy    · Fecal Occult Blood Test (FOBT)/Fecal Immunochemical Test (FIT)  · Fecal DNA/Cologuard Test  · Flexible Sigmoidoscopy Age: 54-65 years old   Colonoscopy: every 10 years (May be performed more frequently if at higher risk)  OR  FOBT/FIT: every 1 year  OR  Cologuard: every 3 years  OR  Sigmoidoscopy: every 5 years  Screening may be recommended earlier than age 48 if at higher risk for colorectal cancer  Also, an individualized decision between you and your healthcare provider will decide whether screening between the ages of 74-80 would be appropriate   Colonoscopy: 01/12/2015  FOBT/FIT: Not on file  Cologuard: Not on file  Sigmoidoscopy: Not on file    Screening Current     Prostate Cancer Screening Individualized decision between patient and health care provider in men between ages of 53-78   Medicare will cover every 12 months beginning on the day after your 50th birthday PSA: 0 8 ng/mL     Screening Current     Hepatitis C Screening Once for adults born between 1945 and 1965  More frequently in patients at high risk for Hepatitis C Hep C Antibody: Not on file        Diabetes Screening 1-2 times per year if you're at risk for diabetes or have pre-diabetes Fasting glucose: 99 mg/dL   A1C: 5 6 %    Screening Current   Cholesterol Screening Once every 5 years if you don't have a lipid disorder  May order more often based on risk factors  Lipid panel: 07/09/2021    Screening Not Indicated  History Lipid Disorder      Other Preventive Screenings Covered by Medicare:  1  Abdominal Aortic Aneurysm (AAA) Screening: covered once if your at risk  You're considered to be at risk if you have a family history of AAA or a male between the age of 73-68 who smoking at least 100 cigarettes in your lifetime  2  Lung Cancer Screening: covers low dose CT scan once per year if you meet all of the following conditions: (1) Age 50-69; (2) No signs or symptoms of lung cancer; (3) Current smoker or have quit smoking within the last 15 years; (4) You have a tobacco smoking history of at least 30 pack years (packs per day x number of years you smoked); (5) You get a written order from a healthcare provider  3  Glaucoma Screening: covered annually if you're considered high risk: (1) You have diabetes OR (2) Family history of glaucoma OR (3)  aged 48 and older OR (3)  American aged 72 and older  3  Osteoporosis Screening: covered every 2 years if you meet one of the following conditions: (1) Have a vertebral abnormality; (2) On glucocorticoid therapy for more than 3 months; (3) Have primary hyperparathyroidism; (4) On osteoporosis medications and need to assess response to drug therapy  5  HIV Screening: covered annually if you're between the age of 12-76  Also covered annually if you are younger than 13 and older than 72 with risk factors for HIV infection  For pregnant patients, it is covered up to 3 times per pregnancy      Immunizations:  Immunization Recommendations   Influenza Vaccine Annual influenza vaccination during flu season is recommended for all persons aged >= 6 months who do not have contraindications   Pneumococcal Vaccine (Prevnar and Pneumovax)  * Prevnar = PCV13  * Pneumovax = PPSV23 Adults 25-60 years old: 1-3 doses may be recommended based on certain risk factors  Adults 72 years old: Prevnar (PCV13) vaccine recommended followed by Pneumovax (PPSV23) vaccine  If already received PPSV23 since turning 65, then PCV13 recommended at least one year after PPSV23 dose  Hepatitis B Vaccine 3 dose series if at intermediate or high risk (ex: diabetes, end stage renal disease, liver disease)   Tetanus (Td) Vaccine - COST NOT COVERED BY MEDICARE PART B Following completion of primary series, a booster dose should be given every 10 years to maintain immunity against tetanus  Td may also be given as tetanus wound prophylaxis  Tdap Vaccine - COST NOT COVERED BY MEDICARE PART B Recommended at least once for all adults  For pregnant patients, recommended with each pregnancy  Shingles Vaccine (Shingrix) - COST NOT COVERED BY MEDICARE PART B  2 shot series recommended in those aged 48 and above     Health Maintenance Due:      Topic Date Due    Hepatitis C Screening  Never done    Colorectal Cancer Screening  01/12/2020     Immunizations Due:      Topic Date Due    DTaP,Tdap,and Td Vaccines (2 - Td or Tdap) 01/24/2021    Influenza Vaccine (1) 09/01/2021     Advance Directives   What are advance directives? Advance directives are legal documents that state your wishes and plans for medical care  These plans are made ahead of time in case you lose your ability to make decisions for yourself  Advance directives can apply to any medical decision, such as the treatments you want, and if you want to donate organs  What are the types of advance directives? There are many types of advance directives, and each state has rules about how to use them  You may choose a combination of any of the following:  · Living will: This is a written record of the treatment you want  You can also choose which treatments you do not want, which to limit, and which to stop at a certain time  This includes surgery, medicine, IV fluid, and tube feedings     · Durable power of  for healthcare [facility]): This is a written record that states who you want to make healthcare choices for you when you are unable to make them for yourself  This person, called a proxy, is usually a family member or a friend  You may choose more than 1 proxy  · Do not resuscitate (DNR) order:  A DNR order is used in case your heart stops beating or you stop breathing  It is a request not to have certain forms of treatment, such as CPR  A DNR order may be included in other types of advance directives  · Medical directive: This covers the care that you want if you are in a coma, near death, or unable to make decisions for yourself  You can list the treatments you want for each condition  Treatment may include pain medicine, surgery, blood transfusions, dialysis, IV or tube feedings, and a ventilator (breathing machine)  · Values history: This document has questions about your views, beliefs, and how you feel and think about life  This information can help others choose the care that you would choose  Why are advance directives important? An advance directive helps you control your care  Although spoken wishes may be used, it is better to have your wishes written down  Spoken wishes can be misunderstood, or not followed  Treatments may be given even if you do not want them  An advance directive may make it easier for your family to make difficult choices about your care  Fall Prevention    Fall prevention  includes ways to make your home and other areas safer  It also includes ways you can move more carefully to prevent a fall  Health conditions that cause changes in your blood pressure, vision, or muscle strength and coordination may increase your risk for falls  Medicines may also increase your risk for falls if they make you dizzy, weak, or sleepy  Fall prevention tips:   · Stand or sit up slowly  · Use assistive devices as directed  · Wear shoes that fit well and have soles that   · Wear a personal alarm  · Stay active  · Manage your medical conditions  Home Safety Tips:  · Add items to prevent falls in the bathroom  · Keep paths clear  · Install bright lights in your home  · Keep items you use often on shelves within reach  · Paint or place reflective tape on the edges of your stairs  Weight Management   Why it is important to manage your weight:  Being overweight increases your risk of health conditions such as heart disease, high blood pressure, type 2 diabetes, and certain types of cancer  It can also increase your risk for osteoarthritis, sleep apnea, and other respiratory problems  Aim for a slow, steady weight loss  Even a small amount of weight loss can lower your risk of health problems  How to lose weight safely:  A safe and healthy way to lose weight is to eat fewer calories and get regular exercise  You can lose up about 1 pound a week by decreasing the number of calories you eat by 500 calories each day  Healthy meal plan for weight management:  A healthy meal plan includes a variety of foods, contains fewer calories, and helps you stay healthy  A healthy meal plan includes the following:  · Eat whole-grain foods more often  A healthy meal plan should contain fiber  Fiber is the part of grains, fruits, and vegetables that is not broken down by your body  Whole-grain foods are healthy and provide extra fiber in your diet  Some examples of whole-grain foods are whole-wheat breads and pastas, oatmeal, brown rice, and bulgur  · Eat a variety of vegetables every day  Include dark, leafy greens such as spinach, kale, korey greens, and mustard greens  Eat yellow and orange vegetables such as carrots, sweet potatoes, and winter squash  · Eat a variety of fruits every day  Choose fresh or canned fruit (canned in its own juice or light syrup) instead of juice  Fruit juice has very little or no fiber  · Eat low-fat dairy foods  Drink fat-free (skim) milk or 1% milk   Eat fat-free yogurt and low-fat cottage cheese  Try low-fat cheeses such as mozzarella and other reduced-fat cheeses  · Choose meat and other protein foods that are low in fat  Choose beans or other legumes such as split peas or lentils  Choose fish, skinless poultry (chicken or turkey), or lean cuts of red meat (beef or pork)  Before you cook meat or poultry, cut off any visible fat  · Use less fat and oil  Try baking foods instead of frying them  Add less fat, such as margarine, sour cream, regular salad dressing and mayonnaise to foods  Eat fewer high-fat foods  Some examples of high-fat foods include french fries, doughnuts, ice cream, and cakes  · Eat fewer sweets  Limit foods and drinks that are high in sugar  This includes candy, cookies, regular soda, and sweetened drinks  Exercise:  Exercise at least 30 minutes per day on most days of the week  Some examples of exercise include walking, biking, dancing, and swimming  You can also fit in more physical activity by taking the stairs instead of the elevator or parking farther away from stores  Ask your healthcare provider about the best exercise plan for you  © Copyright [employer] 2018 Information is for End User's use only and may not be sold, redistributed or otherwise used for commercial purposes   All illustrations and images included in CareNotes® are the copyrighted property of A D [employer]  or [address] [place] [address] [employer]

## 2021-09-05 DIAGNOSIS — I10 ESSENTIAL HYPERTENSION: ICD-10-CM

## 2021-09-07 RX ORDER — HYDROCHLOROTHIAZIDE 12.5 MG/1
TABLET ORAL
Qty: 90 TABLET | Refills: 0 | Status: SHIPPED | OUTPATIENT
Start: 2021-09-07 | End: 2021-12-09

## 2021-11-18 ENCOUNTER — VBI (OUTPATIENT)
Dept: ADMINISTRATIVE | Facility: OTHER | Age: 66
End: 2021-11-18

## 2021-12-09 DIAGNOSIS — I10 ESSENTIAL HYPERTENSION: ICD-10-CM

## 2021-12-09 RX ORDER — HYDROCHLOROTHIAZIDE 12.5 MG/1
TABLET ORAL
Qty: 90 TABLET | Refills: 0 | Status: SHIPPED | OUTPATIENT
Start: 2021-12-09 | End: 2022-03-07

## 2022-01-14 ENCOUNTER — LAB (OUTPATIENT)
Dept: LAB | Facility: HOSPITAL | Age: 67
End: 2022-01-14
Payer: COMMERCIAL

## 2022-01-14 DIAGNOSIS — Z12.5 PROSTATE CANCER SCREENING: ICD-10-CM

## 2022-01-14 DIAGNOSIS — E78.49 OTHER HYPERLIPIDEMIA: ICD-10-CM

## 2022-01-14 DIAGNOSIS — I10 ESSENTIAL HYPERTENSION: ICD-10-CM

## 2022-01-14 LAB
ALBUMIN SERPL BCP-MCNC: 4.4 G/DL (ref 3.5–5)
ALP SERPL-CCNC: 83 U/L (ref 46–116)
ALT SERPL W P-5'-P-CCNC: 37 U/L (ref 12–78)
ANION GAP SERPL CALCULATED.3IONS-SCNC: 8 MMOL/L (ref 4–13)
AST SERPL W P-5'-P-CCNC: 22 U/L (ref 5–45)
BILIRUB SERPL-MCNC: 1.24 MG/DL (ref 0.2–1)
BUN SERPL-MCNC: 19 MG/DL (ref 5–25)
CALCIUM SERPL-MCNC: 9.2 MG/DL (ref 8.3–10.1)
CHLORIDE SERPL-SCNC: 102 MMOL/L (ref 100–108)
CHOLEST SERPL-MCNC: 195 MG/DL
CO2 SERPL-SCNC: 27 MMOL/L (ref 21–32)
CREAT SERPL-MCNC: 1.06 MG/DL (ref 0.6–1.3)
EST. AVERAGE GLUCOSE BLD GHB EST-MCNC: 123 MG/DL
GFR SERPL CREATININE-BSD FRML MDRD: 71 ML/MIN/1.73SQ M
GLUCOSE P FAST SERPL-MCNC: 107 MG/DL (ref 65–99)
HBA1C MFR BLD: 5.9 %
HDLC SERPL-MCNC: 50 MG/DL
LDLC SERPL CALC-MCNC: 124 MG/DL (ref 0–100)
POTASSIUM SERPL-SCNC: 3.9 MMOL/L (ref 3.5–5.3)
PROT SERPL-MCNC: 8 G/DL (ref 6.4–8.2)
PSA SERPL-MCNC: 0.7 NG/ML (ref 0–4)
SODIUM SERPL-SCNC: 137 MMOL/L (ref 136–145)
TRIGL SERPL-MCNC: 106 MG/DL

## 2022-01-14 PROCEDURE — 80061 LIPID PANEL: CPT

## 2022-01-14 PROCEDURE — G0103 PSA SCREENING: HCPCS

## 2022-01-14 PROCEDURE — 80053 COMPREHEN METABOLIC PANEL: CPT

## 2022-01-14 PROCEDURE — 36415 COLL VENOUS BLD VENIPUNCTURE: CPT | Performed by: PHYSICIAN ASSISTANT

## 2022-01-14 PROCEDURE — 83036 HEMOGLOBIN GLYCOSYLATED A1C: CPT | Performed by: PHYSICIAN ASSISTANT

## 2022-01-21 ENCOUNTER — OFFICE VISIT (OUTPATIENT)
Dept: FAMILY MEDICINE CLINIC | Facility: CLINIC | Age: 67
End: 2022-01-21
Payer: COMMERCIAL

## 2022-01-21 VITALS
BODY MASS INDEX: 26.46 KG/M2 | HEIGHT: 67 IN | SYSTOLIC BLOOD PRESSURE: 140 MMHG | WEIGHT: 168.6 LBS | DIASTOLIC BLOOD PRESSURE: 80 MMHG | HEART RATE: 74 BPM | RESPIRATION RATE: 16 BRPM

## 2022-01-21 DIAGNOSIS — R73.9 ELEVATED BLOOD SUGAR: ICD-10-CM

## 2022-01-21 DIAGNOSIS — Z12.11 SCREENING FOR MALIGNANT NEOPLASM OF COLON: ICD-10-CM

## 2022-01-21 DIAGNOSIS — T78.3XXD ANGIOEDEMA, SUBSEQUENT ENCOUNTER: ICD-10-CM

## 2022-01-21 DIAGNOSIS — E78.49 OTHER HYPERLIPIDEMIA: ICD-10-CM

## 2022-01-21 DIAGNOSIS — I10 ESSENTIAL HYPERTENSION: Primary | ICD-10-CM

## 2022-01-21 PROCEDURE — 99214 OFFICE O/P EST MOD 30 MIN: CPT | Performed by: PHYSICIAN ASSISTANT

## 2022-01-21 NOTE — PROGRESS NOTES
Assessment/Plan:    1  Essential hypertension     - on HCTZ, well controlled with diet and exercise, no complaints     2  Other hyperlipidemia     - controlling well with diet, , will repeat in 6 months   3  Angioedema     - saw Dr Monica Domínguez, had allergy testing, thought to be hereditary, controlled with Allegra     4  Elevated Blood sugar     -  Excellent 5 6%, will monitor A1C in 6 months, c/w diet and exercise, will consider yearly     5  Colon polyps     - due for colonoscopy now, Dr Stephen Selby  rto 6 months with labs prior        Subjective:   Chief Complaint   Patient presents with    Hypertension    Hyperlipidemia    Elevated blood sugar      Patient ID: Star Nunez  is a 74 y o  male  Patient here for follow up  Admits eating more bagels and not biking due to winter  Otherwise feeling well  No complaints  Notes cannot tolerate tree nuts, switched to [address] and had immediate heart burn and acid  Stopped and drank oatmilk and completely resolved  Waiting for numbers to drop for colonoscopy        The following portions of the patient's history were reviewed and updated as appropriate: allergies, current medications, past family history, past medical history, past social history, past surgical history and problem list     Past Medical History:   Diagnosis Date    Adjustment disorder     last assessed - [id number]    Allergic rhinitis 02/06/2012    Cubital tunnel syndrome 04/20/2010    last assessed - [id number]    Dermatitis 10/17/2011    last assessed - [id number]    Electrolyte or fluid disorder 01/24/2011    Fatty liver 10/06/2008    Gout     Hypertension     Lichen planus 66/01/2230    Varicella     Wears glasses      Past Surgical History:   Procedure Laterality Date    COLONOSCOPY      CYSTOSCOPY W/ URETEROSCOPY W/ LITHOTRIPSY      MOUTH SURGERY      duct blockage after injury-inside cheek     VAS DUPLEX (HISTORICAL)  (DUPLEX EXT VEINS COMPLETE BL)       Family History   Problem Relation Age of Onset    Cancer Mother     Diabetes Mother     Arthritis Father     Heart failure Father         Congesrive heart failure    Hypertension Father     Prostate cancer Father     COPD Sister     Substance Abuse Neg Hx     Mental illness Neg Hx     Alcohol abuse Neg Hx      Social History     Socioeconomic History    Marital status: Single     Spouse name: Not on file    Number of children: 0    Years of education: Not on file    Highest education level: Not on file   Occupational History    Occupation: [employer]      Comment: retired     Occupation: OR Tech      Employer: [employer]     Comment: retired    Tobacco Use    Smoking status: Never Smoker    Smokeless tobacco: Never Used   Vaping Use    Vaping Use: Never used   Substance and Sexual Activity    Alcohol use: Yes     Comment: social alcohol use, drinks 1 glass of wine twice a week    Drug use: Never    Sexual activity: Not on file   Other Topics Concern    Not on file   Social History Narrative    Drinks coffee - drinks 2 cups a day    Has  Smoke detectors    Uses safety equipment - seatbelts            Who lives in your home: alone    What type of home do you live in: Single house    Age of your home: 59 yrs old     How long have you been living there: 20 yrs    Type of heat: Baseboard    Type of fuel: Oil and Gas    What type of annabelle is in your bedroom: Hardwood floor    Do you have the following in or near your home:    Air products: Central air and Dehumidifier    Pests: None    Pets: None    Are pets allowed in bedroom: N/A    Open fields, wooded areas nearby: Open fields and Wooded areas    Basement: Damp, Musty and Unfinished    Exposure to second hand smoke: No        Habits:    Caffeine: coffee 2 cups daily-hot tea occasionally     Chocolate: occasionally     Other:     Social Determinants of Health     Financial Resource Strain: Not on file   Food Insecurity: Not on file Transportation Needs: Not on file   Physical Activity: Not on file   Stress: Not on file   Social Connections: Not on file   Intimate Partner Violence: Not on file   Housing Stability: Not on file       Current Outpatient Medications:     Calcium Carb-Cholecalciferol (CALCIUM/VITAMIN D PO), Take by mouth, Disp: , Rfl:     hydrochlorothiazide (HYDRODIURIL) 12 5 mg tablet, TAKE 1 TABLET BY MOUTH EVERY DAY, Disp: 90 tablet, Rfl: 0    Misc Natural Products (SAW PALMETTO) CAPS, Take 3 capsules by mouth daily, Disp: , Rfl:     Multiple Vitamins-Minerals (OCUVITE EXTRA) TABS, Take 1 tablet by mouth daily  , Disp: , Rfl:     Multiple Vitamins-Minerals (ONE-A-DAY MENS 50+ PO), Take by mouth daily, Disp: , Rfl:     fexofenadine (ALLEGRA) 180 MG tablet, Take 1 tablet (180 mg total) by mouth daily IN AM, Disp: 30 tablet, Rfl: 11    Review of Systems   Constitutional: Negative for chills and fever  HENT: Negative for ear pain and sore throat  Eyes: Negative for pain and visual disturbance  Respiratory: Negative for cough and shortness of breath  Cardiovascular: Negative for chest pain and palpitations  Gastrointestinal: Negative for abdominal pain and vomiting  Genitourinary: Negative for dysuria and hematuria  Musculoskeletal: Negative for arthralgias and back pain  Skin: Negative for color change and rash  Neurological: Negative for seizures and syncope  All other systems reviewed and are negative  Objective:    Vitals:    01/21/22 0809   BP: 140/80   Pulse: 74   Resp: 16   Weight: 76 5 kg (168 lb 9 6 oz)   Height: 5' 7" (1 702 m)        Physical Exam  Constitutional:       Appearance: Normal appearance  He is well-developed and normal weight  HENT:      Head: Normocephalic and atraumatic  Neck:      Vascular: No carotid bruit  Cardiovascular:      Rate and Rhythm: Normal rate and regular rhythm  Pulses: Normal pulses  Heart sounds: Normal heart sounds     Pulmonary: Effort: Pulmonary effort is normal       Breath sounds: Normal breath sounds  Musculoskeletal:      Cervical back: Normal range of motion and neck supple  Right lower leg: No edema  Left lower leg: No edema  Skin:     General: Skin is warm  Neurological:      General: No focal deficit present  Mental Status: He is alert and oriented to person, place, and time  Psychiatric:         Mood and Affect: Mood normal          Behavior: Behavior normal          Thought Content: Thought content normal          Judgment: Judgment normal          BMI Counseling: Body mass index is 26 41 kg/m²  The BMI is above normal  Nutrition recommendations include reducing portion sizes

## 2022-03-07 DIAGNOSIS — I10 ESSENTIAL HYPERTENSION: ICD-10-CM

## 2022-03-07 RX ORDER — HYDROCHLOROTHIAZIDE 12.5 MG/1
TABLET ORAL
Qty: 90 TABLET | Refills: 0 | Status: SHIPPED | OUTPATIENT
Start: 2022-03-07 | End: 2022-06-11

## 2022-06-11 DIAGNOSIS — I10 ESSENTIAL HYPERTENSION: ICD-10-CM

## 2022-06-11 RX ORDER — HYDROCHLOROTHIAZIDE 12.5 MG/1
TABLET ORAL
Qty: 90 TABLET | Refills: 0 | Status: SHIPPED | OUTPATIENT
Start: 2022-06-11

## 2022-07-14 ENCOUNTER — APPOINTMENT (OUTPATIENT)
Dept: LAB | Facility: HOSPITAL | Age: 67
End: 2022-07-14
Payer: COMMERCIAL

## 2022-07-14 DIAGNOSIS — E78.49 OTHER HYPERLIPIDEMIA: ICD-10-CM

## 2022-07-14 DIAGNOSIS — I10 ESSENTIAL HYPERTENSION: ICD-10-CM

## 2022-07-14 DIAGNOSIS — R73.9 ELEVATED BLOOD SUGAR: ICD-10-CM

## 2022-07-14 LAB
ALBUMIN SERPL BCP-MCNC: 4.2 G/DL (ref 3.5–5)
ALP SERPL-CCNC: 76 U/L (ref 46–116)
ALT SERPL W P-5'-P-CCNC: 36 U/L (ref 12–78)
ANION GAP SERPL CALCULATED.3IONS-SCNC: 3 MMOL/L (ref 4–13)
AST SERPL W P-5'-P-CCNC: 25 U/L (ref 5–45)
BILIRUB SERPL-MCNC: 1.41 MG/DL (ref 0.2–1)
BUN SERPL-MCNC: 16 MG/DL (ref 5–25)
CALCIUM SERPL-MCNC: 9 MG/DL (ref 8.3–10.1)
CHLORIDE SERPL-SCNC: 101 MMOL/L (ref 100–108)
CHOLEST SERPL-MCNC: 171 MG/DL
CO2 SERPL-SCNC: 29 MMOL/L (ref 21–32)
CREAT SERPL-MCNC: 1.04 MG/DL (ref 0.6–1.3)
EST. AVERAGE GLUCOSE BLD GHB EST-MCNC: 117 MG/DL
GFR SERPL CREATININE-BSD FRML MDRD: 73 ML/MIN/1.73SQ M
GLUCOSE P FAST SERPL-MCNC: 105 MG/DL (ref 65–99)
HBA1C MFR BLD: 5.7 %
HDLC SERPL-MCNC: 49 MG/DL
LDLC SERPL CALC-MCNC: 106 MG/DL (ref 0–100)
POTASSIUM SERPL-SCNC: 4.1 MMOL/L (ref 3.5–5.3)
PROT SERPL-MCNC: 7.7 G/DL (ref 6.4–8.2)
SODIUM SERPL-SCNC: 133 MMOL/L (ref 136–145)
TRIGL SERPL-MCNC: 81 MG/DL

## 2022-07-14 PROCEDURE — 80053 COMPREHEN METABOLIC PANEL: CPT

## 2022-07-14 PROCEDURE — 36415 COLL VENOUS BLD VENIPUNCTURE: CPT

## 2022-07-14 PROCEDURE — 83036 HEMOGLOBIN GLYCOSYLATED A1C: CPT

## 2022-07-14 PROCEDURE — 80061 LIPID PANEL: CPT

## 2022-08-05 ENCOUNTER — OFFICE VISIT (OUTPATIENT)
Dept: FAMILY MEDICINE CLINIC | Facility: CLINIC | Age: 67
End: 2022-08-05
Payer: COMMERCIAL

## 2022-08-05 VITALS
WEIGHT: 164.1 LBS | HEIGHT: 67 IN | DIASTOLIC BLOOD PRESSURE: 80 MMHG | RESPIRATION RATE: 15 BRPM | SYSTOLIC BLOOD PRESSURE: 124 MMHG | OXYGEN SATURATION: 98 % | BODY MASS INDEX: 25.75 KG/M2 | HEART RATE: 84 BPM

## 2022-08-05 DIAGNOSIS — T46.4X5A ANGIOTENSIN CONVERTING ENZYME INHIBITOR (ACE-I) INDUCED ANGIOEDEMA OF INTESTINE: ICD-10-CM

## 2022-08-05 DIAGNOSIS — R53.83 FATIGUE, UNSPECIFIED TYPE: ICD-10-CM

## 2022-08-05 DIAGNOSIS — Z00.00 MEDICARE ANNUAL WELLNESS VISIT, SUBSEQUENT: Primary | ICD-10-CM

## 2022-08-05 DIAGNOSIS — E78.49 OTHER HYPERLIPIDEMIA: ICD-10-CM

## 2022-08-05 DIAGNOSIS — I10 ESSENTIAL HYPERTENSION: ICD-10-CM

## 2022-08-05 DIAGNOSIS — Z12.11 ENCOUNTER FOR SCREENING COLONOSCOPY: ICD-10-CM

## 2022-08-05 DIAGNOSIS — R73.9 ELEVATED BLOOD SUGAR: ICD-10-CM

## 2022-08-05 DIAGNOSIS — T78.3XXA ANGIOTENSIN CONVERTING ENZYME INHIBITOR (ACE-I) INDUCED ANGIOEDEMA OF INTESTINE: ICD-10-CM

## 2022-08-05 DIAGNOSIS — Z12.5 SCREENING PSA (PROSTATE SPECIFIC ANTIGEN): ICD-10-CM

## 2022-08-05 PROCEDURE — 99214 OFFICE O/P EST MOD 30 MIN: CPT | Performed by: PHYSICIAN ASSISTANT

## 2022-08-05 PROCEDURE — G0439 PPPS, SUBSEQ VISIT: HCPCS | Performed by: PHYSICIAN ASSISTANT

## 2022-08-05 RX ORDER — MAGNESIUM CARB/ALUMINUM HYDROX 105-160MG
296 TABLET,CHEWABLE ORAL ONCE
Qty: 296 ML | Refills: 0 | Status: SHIPPED | OUTPATIENT
Start: 2022-08-05 | End: 2022-08-05

## 2022-08-05 NOTE — PROGRESS NOTES
Assessment and Plan:     1  AWV - conducted    2  Essential hypertension     - on HCTZ, well controlled with diet and exercise, no complaints     3  Other hyperlipidemia     - controlling well with diet, , will repeat in 6 months     4  Angioedema     - saw Dr Felicita Del Rosario, had allergy testing, thought to be hereditary, controlled with Allegra     5  Elevated Blood sugar     -  Excellent 5 7%, will monitor A1C in 6 months, c/w diet and exercise, will consider yearly     6  Colon polyps     - scheduled for colonoscopy     Preventive health issues were discussed with patient, and age appropriate screening tests were ordered as noted in patient's After Visit Summary  Personalized health advice and appropriate referrals for health education or preventive services given if needed, as noted in patient's After Visit Summary  History of Present Illness:     Patient presents for a Medicare Wellness Visit    Patient here for routine check  Really working on diet and exercise  Has contniue to lose weight focusing diet  Patient Care Team:  Francis Tate PA-C as PCP - General (Family Medicine)  Marissa Nicole MD as PCP - [address] [place] [address] ([facility])     Review of Systems:     Review of Systems   Constitutional: Negative  HENT: Negative  Eyes: Negative  Respiratory: Negative  Cardiovascular: Negative  Gastrointestinal: Negative  Endocrine: Negative  Genitourinary: Negative  Musculoskeletal: Negative  Skin: Negative  Allergic/Immunologic: Negative  Neurological: Negative  Hematological: Negative  Psychiatric/Behavioral: Negative           Problem List:     Patient Active Problem List   Diagnosis    Diverticulosis    Other hyperlipidemia    Essential hypertension    Vitamin D deficiency    Healthcare maintenance    Elevated blood sugar    Allergic conjunctivitis of both eyes    Allergic rhinitis due to house dust mite    Intrinsic atopic dermatitis    Chronic seasonal allergic rhinitis due to pollen    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    Angioedema      Past Medical and Surgical History:     Past Medical History:   Diagnosis Date    Adjustment disorder     last assessed - [id number]    Allergic rhinitis 02/06/2012    Cubital tunnel syndrome 04/20/2010    last assessed - [id number]    Dermatitis 10/17/2011    last assessed - [id number]    Electrolyte or fluid disorder 01/24/2011    Fatty liver 10/06/2008    Gout     Hypertension     Lichen planus 83/40/8057    Varicella     Wears glasses      Past Surgical History:   Procedure Laterality Date    COLONOSCOPY  01/2015    Dr Chandler Adams  Diverticulosis, hemorrhoids and hyperplastic polyp removed  Prep was suboptimal    CYSTOSCOPY W/ URETEROSCOPY W/ LITHOTRIPSY      MOUTH SURGERY      duct blockage after injury-inside cheek     VAS DUPLEX (HISTORICAL)  (DUPLEX EXT VEINS COMPLETE BL)        Family History:     Family History   Problem Relation Age of Onset    Diabetes Mother     Uterine cancer Mother     Arthritis Father     Heart failure Father         Congesrive heart failure    Hypertension Father     Prostate cancer Father     COPD Sister     Substance Abuse Neg Hx     Mental illness Neg Hx     Alcohol abuse Neg Hx       Social History:     Social History     Socioeconomic History    Marital status: Single     Spouse name: None    Number of children: 0    Years of education: None    Highest education level: None   Occupational History    Occupation: [employer]      Comment: retired     Occupation: OR Tech      Employer: [employer]     Comment: retired    Tobacco Use    Smoking status: Never Smoker    Smokeless tobacco: Never Used   Vaping Use    Vaping Use: Never used   Substance and Sexual Activity    Alcohol use:  Yes     Alcohol/week: 1 0 - 2 0 standard drink     Types: 1 - 2 Standard drinks or equivalent per week     Comment: social alcohol use, drinks 1 glass of wine twice a week    Drug use: Never    Sexual activity: None   Other Topics Concern    None   Social History Narrative    Drinks coffee - drinks 2 cups a day    Has  Smoke detectors    Uses safety equipment - seatbelts            Who lives in your home: alone    What type of home do you live in: Single house    Age of your home: 59 yrs old     How long have you been living there: 20 yrs    Type of heat: Baseboard    Type of fuel: Oil and Gas    What type of annabelle is in your bedroom: Hardwood floor    Do you have the following in or near your home:    Air products: Central air and Dehumidifier    Pests: None    Pets: None    Are pets allowed in bedroom: N/A    Open fields, wooded areas nearby: Open fields and Wooded areas    Basement: Damp, Musty and Unfinished    Exposure to second hand smoke: No        Habits:    Caffeine: coffee 2 cups daily-hot tea occasionally     Chocolate: occasionally     Other:     Social Determinants of Health     Financial Resource Strain: Not on file   Food Insecurity: Not on file   Transportation Needs: Not on file   Physical Activity: Not on file   Stress: Not on file   Social Connections: Not on file   Intimate Partner Violence: Not on file   Housing Stability: Not on file      Medications and Allergies:     Current Outpatient Medications   Medication Sig Dispense Refill    Calcium Carb-Cholecalciferol (CALCIUM/VITAMIN D PO) Take by mouth      Clenpiq 10-3 5-12 MG-GM -GM/160ML SOLN Take 1 Bottle by mouth 2 (two) times a day 320 mL 0    hydrochlorothiazide (HYDRODIURIL) 12 5 mg tablet TAKE 1 TABLET BY MOUTH EVERY DAY 90 tablet 0    Misc Natural Products (SAW PALMETTO) CAPS Take 3 capsules by mouth daily      Multiple Vitamins-Minerals (OCUVITE EXTRA) TABS Take 1 tablet by mouth daily        Multiple Vitamins-Minerals (ONE-A-DAY MENS 50+ PO) Take by mouth daily      fexofenadine (ALLEGRA) 180 MG tablet Take 1 tablet (180 mg total) by mouth daily IN AM 30 tablet 11     No current facility-administered medications for this visit  Allergies   Allergen Reactions    Shellfish Allergy - Food Allergy Hives and GI Intolerance    Allopurinol      Other reaction(s): Reverse reaction    [place] (Diagnostic) - Food Allergy GI Intolerance    Aspirin Hives    Lisinopril Swelling    Other Allergic Rhinitis, Cough and Edema     Trees, tree nuts    Penicillins Hives    Treenut [Nuts - Food Allergy] GI Intolerance    Valsartan Edema      Immunizations:     Immunization History   Administered Date(s) Administered    COVID-19 MODERNA VACC 0 5 ML IM 03/24/2021, 04/21/2021    INFLUENZA 10/01/2015, 09/21/2016, 10/30/2017, 10/30/2017, 11/05/2018    Influenza Quadrivalent Preservative Free 3 years and older IM 10/01/2015    Influenza Quadrivalent, 6-35 Months IM 11/03/2017    Influenza Split High Dose Preservative Free IM 11/05/2018    Influenza, high dose seasonal 0 7 mL 09/19/2019, 09/29/2020, 10/05/2021    Influenza, seasonal, injectable 10/19/2010, 09/06/2012, 09/16/2013, 09/21/2016    Pneumococcal Conjugate 13-Valent 03/12/2019    Pneumococcal Polysaccharide PPV23 06/11/2020    Tdap 01/24/2011    Zoster 04/01/2014    Zoster Vaccine Recombinant 04/01/2014      Health Maintenance:         Topic Date Due    Colorectal Cancer Screening  01/12/2020    Hepatitis C Screening  02/21/2024 (Originally 1955)         Topic Date Due    COVID-19 Vaccine (3 - Booster for Moderna series) 09/21/2021    Influenza Vaccine (1) 09/01/2022      Medicare Screening Tests and Risk Assessments:     Phuong Hanson is here for his Subsequent Wellness visit  Health Risk Assessment:   Patient rates overall health as good  Patient feels that their physical health rating is same  Patient is satisfied with their life  Eyesight was rated as same  Hearing was rated as same  Patient feels that their emotional and mental health rating is same  Patients states they are never, rarely angry  Patient states they are sometimes unusually tired/fatigued  Pain experienced in the last 7 days has been none  Patient states that he has experienced no weight loss or gain in last 6 months  Depression Screening:   PHQ-2 Score: 0      Fall Risk Screening: In the past year, patient has experienced: no history of falling in past year      Home Safety:  Patient does not have trouble with stairs inside or outside of their home  Patient has working smoke alarms and has working carbon monoxide detector  Home safety hazards include: none  Nutrition:   Current diet is Regular and Limited junk food  Medications:   Patient is currently taking over-the-counter supplements  OTC medications include: see medication list  Patient is able to manage medications  Activities of Daily Living (ADLs)/Instrumental Activities of Daily Living (IADLs):   Walk and transfer into and out of bed and chair?: Yes  Dress and groom yourself?: Yes    Bathe or shower yourself?: Yes    Feed yourself?  Yes  Do your laundry/housekeeping?: Yes  Manage your money, pay your bills and track your expenses?: Yes  Make your own meals?: Yes    Do your own shopping?: Yes    Previous Hospitalizations:   Any hospitalizations or ED visits within the last 12 months?: No      Advance Care Planning:   Living will: Yes    Advanced directive: Yes    End of Life Decisions reviewed with patient: Yes    Provider agrees with end of life decisions: Yes      Cognitive Screening:   Provider or family/friend/caregiver concerned regarding cognition?: No    PREVENTIVE SCREENINGS      Cardiovascular Screening:    General: Screening Not Indicated and History Lipid Disorder      Diabetes Screening:     General: Screening Current      Colorectal Cancer Screening:     General: Screening Current      Prostate Cancer Screening:    General: Screening Current      Osteoporosis Screening:    General: Screening Not Indicated      Abdominal Aortic Aneurysm (AAA) Screening: Risk factors include: age between 73-67 yo        General: Screening Not Indicated      Lung Cancer Screening:     General: Screening Not Indicated      Hepatitis C Screening:    General: Screening Current    Screening, Brief Intervention, and Referral to Treatment (SBIRT)    Screening  Typical number of drinks in a day: 1  Typical number of drinks in a week: 4  Interpretation: Low risk drinking behavior  Single Item Drug Screening:  How often have you used an illegal drug (including marijuana) or a prescription medication for non-medical reasons in the past year? never    Single Item Drug Screen Score: 0  Interpretation: Negative screen for possible drug use disorder    Brief Intervention  Alcohol & drug use screenings were reviewed  No concerns regarding substance use disorder identified  Other Counseling Topics:   Car/seat belt/driving safety, skin self-exam, sunscreen and calcium and vitamin D intake and regular weightbearing exercise  No exam data present     Physical Exam:     There were no vitals taken for this visit  Physical Exam  Constitutional:       Appearance: He is well-developed  HENT:      Head: Normocephalic and atraumatic  Eyes:      Extraocular Movements: Extraocular movements intact  Conjunctiva/sclera: Conjunctivae normal       Pupils: Pupils are equal, round, and reactive to light  Neck:      Thyroid: No thyromegaly  Cardiovascular:      Rate and Rhythm: Normal rate and regular rhythm  Pulses: Normal pulses  Heart sounds: Normal heart sounds  No murmur heard  Pulmonary:      Effort: Pulmonary effort is normal       Breath sounds: Normal breath sounds  No wheezing or rales  Abdominal:      General: Abdomen is flat  Bowel sounds are normal       Palpations: Abdomen is soft  There is no mass  Tenderness: There is no abdominal tenderness  There is no rebound  Musculoskeletal:         General: Normal range of motion        Cervical back: Normal range of motion and neck supple  Lymphadenopathy:      Cervical: No cervical adenopathy  Skin:     General: Skin is warm  Neurological:      General: No focal deficit present  Mental Status: He is alert and oriented to person, place, and time  Cranial Nerves: No cranial nerve deficit  Deep Tendon Reflexes: Reflexes normal    Psychiatric:         Mood and Affect: Mood normal          Behavior: Behavior normal          Thought Content:  Thought content normal          Judgment: Judgment normal           Radha Guthrie PA-C

## 2022-08-05 NOTE — PATIENT INSTRUCTIONS

## 2022-09-09 DIAGNOSIS — I10 ESSENTIAL HYPERTENSION: ICD-10-CM

## 2022-09-09 RX ORDER — HYDROCHLOROTHIAZIDE 12.5 MG/1
TABLET ORAL
Qty: 90 TABLET | Refills: 0 | Status: SHIPPED | OUTPATIENT
Start: 2022-09-09

## 2022-12-08 DIAGNOSIS — I10 ESSENTIAL HYPERTENSION: ICD-10-CM

## 2022-12-09 RX ORDER — HYDROCHLOROTHIAZIDE 12.5 MG/1
TABLET ORAL
Qty: 90 TABLET | Refills: 0 | Status: SHIPPED | OUTPATIENT
Start: 2022-12-09

## 2023-01-19 ENCOUNTER — APPOINTMENT (OUTPATIENT)
Dept: LAB | Facility: HOSPITAL | Age: 68
End: 2023-01-19

## 2023-01-19 DIAGNOSIS — R53.83 FATIGUE, UNSPECIFIED TYPE: ICD-10-CM

## 2023-01-19 DIAGNOSIS — R73.9 ELEVATED BLOOD SUGAR: ICD-10-CM

## 2023-01-19 DIAGNOSIS — E78.49 OTHER HYPERLIPIDEMIA: ICD-10-CM

## 2023-01-19 DIAGNOSIS — Z12.5 SCREENING PSA (PROSTATE SPECIFIC ANTIGEN): ICD-10-CM

## 2023-01-19 LAB
ALBUMIN SERPL BCP-MCNC: 4.3 G/DL (ref 3.5–5)
ALP SERPL-CCNC: 78 U/L (ref 46–116)
ALT SERPL W P-5'-P-CCNC: 39 U/L (ref 12–78)
ANION GAP SERPL CALCULATED.3IONS-SCNC: 4 MMOL/L (ref 4–13)
AST SERPL W P-5'-P-CCNC: 23 U/L (ref 5–45)
BASOPHILS # BLD AUTO: 0.04 THOUSANDS/ÂΜL (ref 0–0.1)
BASOPHILS NFR BLD AUTO: 1 % (ref 0–1)
BILIRUB SERPL-MCNC: 1.4 MG/DL (ref 0.2–1)
BUN SERPL-MCNC: 17 MG/DL (ref 5–25)
CALCIUM SERPL-MCNC: 9.3 MG/DL (ref 8.3–10.1)
CHLORIDE SERPL-SCNC: 101 MMOL/L (ref 96–108)
CHOLEST SERPL-MCNC: 189 MG/DL
CO2 SERPL-SCNC: 30 MMOL/L (ref 21–32)
CREAT SERPL-MCNC: 1.01 MG/DL (ref 0.6–1.3)
EOSINOPHIL # BLD AUTO: 0.03 THOUSAND/ÂΜL (ref 0–0.61)
EOSINOPHIL NFR BLD AUTO: 1 % (ref 0–6)
ERYTHROCYTE [DISTWIDTH] IN BLOOD BY AUTOMATED COUNT: 11.9 % (ref 11.6–15.1)
GFR SERPL CREATININE-BSD FRML MDRD: 75 ML/MIN/1.73SQ M
GLUCOSE P FAST SERPL-MCNC: 121 MG/DL (ref 65–99)
HCT VFR BLD AUTO: 42.4 % (ref 36.5–49.3)
HDLC SERPL-MCNC: 52 MG/DL
HGB BLD-MCNC: 14.2 G/DL (ref 12–17)
IMM GRANULOCYTES # BLD AUTO: 0 THOUSAND/UL (ref 0–0.2)
IMM GRANULOCYTES NFR BLD AUTO: 0 % (ref 0–2)
LDLC SERPL CALC-MCNC: 119 MG/DL (ref 0–100)
LYMPHOCYTES # BLD AUTO: 1.26 THOUSANDS/ÂΜL (ref 0.6–4.47)
LYMPHOCYTES NFR BLD AUTO: 31 % (ref 14–44)
MCH RBC QN AUTO: 31 PG (ref 26.8–34.3)
MCHC RBC AUTO-ENTMCNC: 33.5 G/DL (ref 31.4–37.4)
MCV RBC AUTO: 93 FL (ref 82–98)
MONOCYTES # BLD AUTO: 0.53 THOUSAND/ÂΜL (ref 0.17–1.22)
MONOCYTES NFR BLD AUTO: 13 % (ref 4–12)
NEUTROPHILS # BLD AUTO: 2.27 THOUSANDS/ÂΜL (ref 1.85–7.62)
NEUTS SEG NFR BLD AUTO: 54 % (ref 43–75)
NRBC BLD AUTO-RTO: 0 /100 WBCS
PLATELET # BLD AUTO: 205 THOUSANDS/UL (ref 149–390)
PMV BLD AUTO: 11.5 FL (ref 8.9–12.7)
POTASSIUM SERPL-SCNC: 3.9 MMOL/L (ref 3.5–5.3)
PROT SERPL-MCNC: 7.9 G/DL (ref 6.4–8.4)
PSA SERPL-MCNC: 0.7 NG/ML (ref 0–4)
RBC # BLD AUTO: 4.58 MILLION/UL (ref 3.88–5.62)
SODIUM SERPL-SCNC: 135 MMOL/L (ref 135–147)
TRIGL SERPL-MCNC: 90 MG/DL
WBC # BLD AUTO: 4.13 THOUSAND/UL (ref 4.31–10.16)

## 2023-01-20 LAB
EST. AVERAGE GLUCOSE BLD GHB EST-MCNC: 120 MG/DL
HBA1C MFR BLD: 5.8 %

## 2023-01-30 ENCOUNTER — RA CDI HCC (OUTPATIENT)
Dept: OTHER | Facility: HOSPITAL | Age: 68
End: 2023-01-30

## 2023-01-30 NOTE — PROGRESS NOTES
[facility] 75  coding opportunities       Chart reviewed, no opportunity found:   [address]        Patients Insurance     Medicare Insurance: [employer] Advantage

## 2023-02-03 ENCOUNTER — OFFICE VISIT (OUTPATIENT)
Dept: FAMILY MEDICINE CLINIC | Facility: CLINIC | Age: 68
End: 2023-02-03

## 2023-02-03 VITALS
HEIGHT: 67 IN | OXYGEN SATURATION: 94 % | WEIGHT: 170 LBS | HEART RATE: 90 BPM | DIASTOLIC BLOOD PRESSURE: 90 MMHG | RESPIRATION RATE: 16 BRPM | SYSTOLIC BLOOD PRESSURE: 146 MMHG | BODY MASS INDEX: 26.68 KG/M2

## 2023-02-03 DIAGNOSIS — E78.49 OTHER HYPERLIPIDEMIA: Primary | ICD-10-CM

## 2023-02-03 DIAGNOSIS — K63.5 POLYP OF COLON, UNSPECIFIED PART OF COLON, UNSPECIFIED TYPE: ICD-10-CM

## 2023-02-03 DIAGNOSIS — I10 ESSENTIAL HYPERTENSION: ICD-10-CM

## 2023-02-03 DIAGNOSIS — T78.3XXD ANGIOEDEMA, SUBSEQUENT ENCOUNTER: ICD-10-CM

## 2023-02-03 NOTE — PROGRESS NOTES
Assessment/Plan:    1  Essential hypertension     - on HCTZ, well controlled with diet and exercise, no complaints, EKG today shows no change     3  Other hyperlipidemia     - controlling well with diet, , will repeat in 6 months      4  Angioedema     - saw Dr Drew Rascon, had allergy testing, thought to be hereditary, controlled with Allegra     5  Elevated Blood sugar     -  Excellent 5 8%, will monitor A1C in 6 months, c/w diet and exercise, will consider yearly     6  Colon polyps     - done 2022 with 2 polyps, repeat 5 years    F/u as needed  F/u 6 months        Subjective:   Chief Complaint   Patient presents with   • Hypertension   • Hyperlipidemia      Patient ID: Garrett Kumar  is a 69 y o  male  Patient here for recheck, has not been biking but has been walking a lot  Watching his diet  Compliant with medications  Had colonoscopy done 2022, 2 polyps  Repeat 5 years  Seeing massage therapist for right quad strain while biking, getting better  The following portions of the patient's history were reviewed and updated as appropriate: allergies, current medications, past family history, past medical history, past social history, past surgical history and problem list     Past Medical History:   Diagnosis Date   • Adjustment disorder     last assessed - 25Mar2014   • Allergic rhinitis 02/06/2012   • Cubital tunnel syndrome 04/20/2010    last assessed - 13Apr2012   • Dermatitis 10/17/2011    last assessed - 13Apr2012   • Electrolyte or fluid disorder 01/24/2011   • Fatty liver 10/06/2008   • Gout    • Hypertension    • Lichen planus 00/73/4438   • Varicella    • Wears glasses      Past Surgical History:   Procedure Laterality Date   • COLONOSCOPY  01/2015    Dr Vladimir Mckenna  Diverticulosis, hemorrhoids and hyperplastic polyp removed    Prep was suboptimal   • CYSTOSCOPY W/ URETEROSCOPY W/ LITHOTRIPSY     • MOUTH SURGERY      duct blockage after injury-inside cheek    • VAS DUPLEX (HISTORICAL)  (DUPLEX EXT VEINS COMPLETE BL)       Family History   Problem Relation Age of Onset   • Diabetes Mother    • Uterine cancer Mother    • Arthritis Father    • Heart failure Father         Congesrive heart failure   • Hypertension Father    • Prostate cancer Father    • COPD Sister    • Substance Abuse Neg Hx    • Mental illness Neg Hx    • Alcohol abuse Neg Hx      Social History     Socioeconomic History   • Marital status: Single     Spouse name: Not on file   • Number of children: 0   • Years of education: Not on file   • Highest education level: Not on file   Occupational History   • Occupation: [email or web address]      Comment: retired    • Occupation: OR Tech      Employer: [employer] EMPLOYEES     Comment: retired    Tobacco Use   • Smoking status: Never   • Smokeless tobacco: Never   Vaping Use   • Vaping Use: Never used   Substance and Sexual Activity   • Alcohol use:  Yes     Alcohol/week: 1 0 - 2 0 standard drink     Types: 1 - 2 Standard drinks or equivalent per week     Comment: social alcohol use, drinks 1 glass of wine twice a week   • Drug use: Never   • Sexual activity: Not on file   Other Topics Concern   • Not on file   Social History Narrative    Drinks coffee - drinks 2 cups a day    Has  Smoke detectors    Uses safety equipment - seatbelts            Who lives in your home: alone    What type of home do you live in: Single house    Age of your home: 59 yrs old     How long have you been living there: 20 yrs    Type of heat: Baseboard    Type of fuel: Oil and Gas    What type of annabelle is in your bedroom: Hardwood floor    Do you have the following in or near your home:    Air products: Central air and Dehumidifier    Pests: None    Pets: None    Are pets allowed in bedroom: N/A    Open fields, wooded areas nearby: Open fields and Wooded areas    Basement: Damp, Musty and Unfinished    Exposure to second hand smoke: No        Habits:    Caffeine: coffee 2 cups daily-hot tea occasionally     Chocolate: occasionally     Other:     Social Determinants of Health     Financial Resource Strain: Not on file   Food Insecurity: Not on file   Transportation Needs: Not on file   Physical Activity: Not on file   Stress: Not on file   Social Connections: Not on file   Intimate Partner Violence: Not on file   Housing Stability: Not on file       Current Outpatient Medications:   •  Calcium Carb-Cholecalciferol (CALCIUM/VITAMIN D PO), Take by mouth, Disp: , Rfl:   •  fexofenadine (ALLEGRA) 180 MG tablet, Take 1 tablet (180 mg total) by mouth daily IN AM, Disp: 30 tablet, Rfl: 11  •  hydrochlorothiazide (HYDRODIURIL) 12 5 mg tablet, TAKE 1 TABLET BY MOUTH EVERY DAY, Disp: 90 tablet, Rfl: 0  •  Misc Natural Products (SAW PALMETTO) CAPS, Take 3 capsules by mouth daily, Disp: , Rfl:   •  Multiple Vitamins-Minerals (OCUVITE EXTRA) TABS, Take 1 tablet by mouth daily  , Disp: , Rfl:   •  Multiple Vitamins-Minerals (ONE-A-DAY MENS 50+ PO), Take by mouth daily, Disp: , Rfl:   •  TART CHERRY PO, Take 1,000 mg by mouth in the morning, Disp: , Rfl:     Review of Systems   Constitutional: Negative for chills and fever  HENT: Negative for ear pain and sore throat  Eyes: Negative for pain and visual disturbance  Respiratory: Negative for cough and shortness of breath  Cardiovascular: Negative for chest pain and palpitations  Gastrointestinal: Negative for abdominal pain and vomiting  Genitourinary: Negative for dysuria and hematuria  Musculoskeletal: Negative for arthralgias and back pain  Skin: Negative for color change and rash  Neurological: Negative for seizures and syncope  All other systems reviewed and are negative  Objective:    Vitals:    02/03/23 0800   BP: 146/90   BP Location: Left arm   Patient Position: Sitting   Cuff Size: Standard   Pulse: 90   Resp: 16   SpO2: 94%   Weight: 77 1 kg (170 lb)   Height: 5' 7" (1 702 m)        Physical Exam  Constitutional:       Appearance: Normal appearance   He is well-developed and normal weight  HENT:      Head: Normocephalic and atraumatic  Neck:      Vascular: No carotid bruit  Cardiovascular:      Rate and Rhythm: Normal rate and regular rhythm  Pulses: Normal pulses  Heart sounds: Normal heart sounds  Pulmonary:      Effort: Pulmonary effort is normal       Breath sounds: Normal breath sounds  Musculoskeletal:         General: Normal range of motion  Cervical back: Normal range of motion and neck supple  Right lower leg: No edema  Left lower leg: No edema  Skin:     General: Skin is warm  Neurological:      General: No focal deficit present  Mental Status: He is alert and oriented to person, place, and time  Psychiatric:         Mood and Affect: Mood normal          Behavior: Behavior normal          Thought Content:  Thought content normal          Judgment: Judgment normal  No

## 2023-03-08 DIAGNOSIS — I10 ESSENTIAL HYPERTENSION: ICD-10-CM

## 2023-03-08 RX ORDER — HYDROCHLOROTHIAZIDE 12.5 MG/1
TABLET ORAL
Qty: 90 TABLET | Refills: 0 | Status: SHIPPED | OUTPATIENT
Start: 2023-03-08

## 2023-03-22 ENCOUNTER — TELEPHONE (OUTPATIENT)
Dept: FAMILY MEDICINE CLINIC | Facility: CLINIC | Age: 68
End: 2023-03-22

## 2023-03-22 DIAGNOSIS — R73.9 ELEVATED BLOOD SUGAR: ICD-10-CM

## 2023-03-22 DIAGNOSIS — E78.49 OTHER HYPERLIPIDEMIA: Primary | ICD-10-CM

## 2023-03-22 NOTE — TELEPHONE ENCOUNTER
Patient stopped in office and asked if you want him to get blood work before next apt in august?    Pt goes to [facility]

## 2023-06-05 DIAGNOSIS — I10 ESSENTIAL HYPERTENSION: ICD-10-CM

## 2023-06-05 RX ORDER — HYDROCHLOROTHIAZIDE 12.5 MG/1
TABLET ORAL
Qty: 90 TABLET | Refills: 0 | Status: SHIPPED | OUTPATIENT
Start: 2023-06-05

## 2023-08-14 ENCOUNTER — APPOINTMENT (OUTPATIENT)
Dept: LAB | Facility: HOSPITAL | Age: 68
End: 2023-08-14
Payer: COMMERCIAL

## 2023-08-14 DIAGNOSIS — E78.49 OTHER HYPERLIPIDEMIA: ICD-10-CM

## 2023-08-14 DIAGNOSIS — R73.9 ELEVATED BLOOD SUGAR: ICD-10-CM

## 2023-08-14 LAB
ALBUMIN SERPL BCP-MCNC: 4.4 G/DL (ref 3.5–5)
ALP SERPL-CCNC: 75 U/L (ref 46–116)
ALT SERPL W P-5'-P-CCNC: 41 U/L (ref 12–78)
ANION GAP SERPL CALCULATED.3IONS-SCNC: 4 MMOL/L
AST SERPL W P-5'-P-CCNC: 25 U/L (ref 5–45)
BILIRUB SERPL-MCNC: 1.5 MG/DL (ref 0.2–1)
BUN SERPL-MCNC: 15 MG/DL (ref 5–25)
CALCIUM SERPL-MCNC: 9.1 MG/DL (ref 8.3–10.1)
CHLORIDE SERPL-SCNC: 105 MMOL/L (ref 96–108)
CHOLEST SERPL-MCNC: 176 MG/DL
CO2 SERPL-SCNC: 28 MMOL/L (ref 21–32)
CREAT SERPL-MCNC: 1.03 MG/DL (ref 0.6–1.3)
EST. AVERAGE GLUCOSE BLD GHB EST-MCNC: 123 MG/DL
GFR SERPL CREATININE-BSD FRML MDRD: 73 ML/MIN/1.73SQ M
GLUCOSE P FAST SERPL-MCNC: 119 MG/DL (ref 65–99)
HBA1C MFR BLD: 5.9 %
HDLC SERPL-MCNC: 54 MG/DL
LDLC SERPL CALC-MCNC: 107 MG/DL (ref 0–100)
POTASSIUM SERPL-SCNC: 4.1 MMOL/L (ref 3.5–5.3)
PROT SERPL-MCNC: 7.6 G/DL (ref 6.4–8.4)
SODIUM SERPL-SCNC: 137 MMOL/L (ref 135–147)
TRIGL SERPL-MCNC: 77 MG/DL

## 2023-08-14 PROCEDURE — 83036 HEMOGLOBIN GLYCOSYLATED A1C: CPT

## 2023-08-14 PROCEDURE — 80061 LIPID PANEL: CPT

## 2023-08-14 PROCEDURE — 80053 COMPREHEN METABOLIC PANEL: CPT

## 2023-08-14 PROCEDURE — 36415 COLL VENOUS BLD VENIPUNCTURE: CPT

## 2023-08-21 ENCOUNTER — OFFICE VISIT (OUTPATIENT)
Dept: FAMILY MEDICINE CLINIC | Facility: CLINIC | Age: 68
End: 2023-08-21
Payer: COMMERCIAL

## 2023-08-21 ENCOUNTER — TELEPHONE (OUTPATIENT)
Dept: FAMILY MEDICINE CLINIC | Facility: CLINIC | Age: 68
End: 2023-08-21

## 2023-08-21 VITALS
DIASTOLIC BLOOD PRESSURE: 72 MMHG | HEIGHT: 67 IN | OXYGEN SATURATION: 98 % | SYSTOLIC BLOOD PRESSURE: 130 MMHG | TEMPERATURE: 97.8 F | WEIGHT: 161 LBS | HEART RATE: 76 BPM | BODY MASS INDEX: 25.27 KG/M2 | RESPIRATION RATE: 16 BRPM

## 2023-08-21 DIAGNOSIS — R73.9 ELEVATED BLOOD SUGAR: ICD-10-CM

## 2023-08-21 DIAGNOSIS — I10 ESSENTIAL HYPERTENSION: ICD-10-CM

## 2023-08-21 DIAGNOSIS — M54.12 CERVICAL RADICULOPATHY: Primary | ICD-10-CM

## 2023-08-21 DIAGNOSIS — Z00.00 MEDICARE ANNUAL WELLNESS VISIT, SUBSEQUENT: Primary | ICD-10-CM

## 2023-08-21 DIAGNOSIS — K63.5 POLYP OF COLON, UNSPECIFIED PART OF COLON, UNSPECIFIED TYPE: ICD-10-CM

## 2023-08-21 DIAGNOSIS — S81.801A WOUND OF RIGHT LOWER EXTREMITY, INITIAL ENCOUNTER: ICD-10-CM

## 2023-08-21 DIAGNOSIS — S80.811A ABRASION, RIGHT LOWER LEG, INITIAL ENCOUNTER: ICD-10-CM

## 2023-08-21 DIAGNOSIS — E78.49 OTHER HYPERLIPIDEMIA: ICD-10-CM

## 2023-08-21 PROCEDURE — 90714 TD VACC NO PRESV 7 YRS+ IM: CPT | Performed by: PHYSICIAN ASSISTANT

## 2023-08-21 PROCEDURE — G0439 PPPS, SUBSEQ VISIT: HCPCS | Performed by: PHYSICIAN ASSISTANT

## 2023-08-21 PROCEDURE — 99214 OFFICE O/P EST MOD 30 MIN: CPT | Performed by: PHYSICIAN ASSISTANT

## 2023-08-21 PROCEDURE — 90471 IMMUNIZATION ADMIN: CPT | Performed by: PHYSICIAN ASSISTANT

## 2023-08-21 NOTE — PROGRESS NOTES
Assessment and Plan:     1. AWV - conducted     2. HTN - well controlled on HCTZ 12.5 mg once daily    3. Hyperlipidemia - well controlled with diet and exercise, monitor q 6 months    4. IFG - A1C 5.9%, continue with diet, exercise, repeat 6 months    5. Wound right lower leg - healing nicely, TD today    6. Polyp of colon - colon due 2027    7. Numbness upper arms - refer to PT    F/u 6 months with labs or sooner if needed    BMI Counseling: Body mass index is 25.22 kg/m². The BMI is above normal. Nutrition recommendations include decreasing portion sizes. Rationale for BMI follow-up plan is due to patient being overweight or obese. Depression Screening and Follow-up Plan: Patient was screened for depression during today's encounter. They screened negative with a PHQ-2 score of 2. Preventive health issues were discussed with patient, and age appropriate screening tests were ordered as noted in patient's After Visit Summary. Personalized health advice and appropriate referrals for health education or preventive services given if needed, as noted in patient's After Visit Summary. History of Present Illness:     Patient presents for a Medicare Wellness Visit    Patient here for routine follow up. Admits he always had tight neck muscles, causes tightness in forearms b/l off and on over years. Sees a massage therapist for this. Now has had it since May with minimal improvement, normally starts with biking this time with moving the lawn. No weakness. Cut his right lower leg on a bike pedal a week ago, healing nicely.       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 Diverticulosis, hemorrhoids and hyperplastic polyp removed.   Prep was suboptimal   • CYSTOSCOPY W/ URETEROSCOPY W/ LITHOTRIPSY     • MOUTH SURGERY      duct blockage after injury-inside cheek    • VAS DUPLEX (HISTORICAL)  (DUPLEX EXT VEINS COMPLETE BL)        Family History:     Family History   Problem Relation Age of Onset   • Diabetes Mother    • Uterine cancer Mother    • Arthritis Father    • Heart failure Father         Congesrive heart failure   • Hypertension Father    • Prostate cancer Father    • COPD Sister    • Substance Abuse Neg Hx    • Mental illness Neg Hx    • Alcohol abuse Neg Hx       Social History:     Social History     Socioeconomic History   • Marital status: Single     Spouse name: None   • Number of children: 0   • Years of education: None   • Highest education level: None   Occupational History   • Occupation: [employer]      Comment: retired    • Occupation: OR Tech      Employer: [employer]     Comment: retired    Tobacco Use   • Smoking status: Never   • Smokeless tobacco: Never   Vaping Use   • Vaping Use: Never used   Substance and Sexual Activity   • Alcohol use: Yes     Alcohol/week: 1.0 - 2.0 standard drink of alcohol     Types: 1 - 2 Standard drinks or equivalent per week     Comment: social alcohol use, drinks 1 glass of wine twice a week   • Drug use: Never   • Sexual activity: None   Other Topics Concern   • None   Social History Narrative    Drinks coffee - drinks 2 cups a day    Has  Smoke detectors    Uses safety equipment - seatbelts            Who lives in your home: alone    What type of home do you live in: Single house    Age of your home: 59 yrs old     How long have you been living there: 20 yrs    Type of heat: Baseboard    Type of fuel: Oil and Gas    What type of annabelle is in your bedroom: Hardwood floor    Do you have the following in or near your home:    Air products: Central air and Dehumidifier    Pests: None    Pets: None    Are pets allowed in bedroom: N/A    Open fields, wooded areas nearby: Open fields and Wooded areas    Basement: Damp, Musty and Unfinished    Exposure to second hand smoke: No        Habits:    Caffeine: coffee 2 cups daily-hot tea occasionally     Chocolate: occasionally     Other:     Social Determinants of Health     Financial Resource Strain: Low Risk  (8/21/2023)    Overall Financial Resource Strain (CARDIA)    • Difficulty of Paying Living Expenses: Not hard at all   Food Insecurity: Not on file   Transportation Needs: Unmet Transportation Needs (8/21/2023)    PRAPARE - Transportation    • Lack of Transportation (Medical):  No    • Lack of Transportation (Non-Medical): Yes   Physical Activity: Sufficiently Active (9/29/2020)    Exercise Vital Sign    • Days of Exercise per Week: 7 days    • Minutes of Exercise per Session: 40 min   Stress: Not on file   Social Connections: Not on file   Intimate Partner Violence: Not on file   Housing Stability: Not on file      Medications and Allergies:     Current Outpatient Medications   Medication Sig Dispense Refill   • Calcium Carb-Cholecalciferol (CALCIUM/VITAMIN D PO) Take by mouth     • fexofenadine (ALLEGRA) 180 MG tablet Take 1 tablet (180 mg total) by mouth daily IN AM 30 tablet 11   • hydrochlorothiazide (HYDRODIURIL) 12.5 mg tablet TAKE 1 TABLET BY MOUTH EVERY DAY 90 tablet 0   • Misc Natural Products (SAW PALMETTO) CAPS Take 3 capsules by mouth daily     • Multiple Vitamins-Minerals (OCUVITE EXTRA) TABS Take 1 tablet by mouth daily       • Multiple Vitamins-Minerals (ONE-A-DAY MENS 50+ PO) Take by mouth daily     • TART CHERRY PO Take 1,000 mg by mouth in the morning       No current facility-administered medications for this visit.      Allergies   Allergen Reactions   • Shellfish Allergy - Food Allergy Hives and GI Intolerance   • Allopurinol      Other reaction(s): Reverse reaction   • [place] (Diagnostic) - Food Allergy GI Intolerance   • Aspirin Hives   • Lisinopril Swelling   • Other Allergic Rhinitis, Cough and Edema     Trees, tree nuts   • Penicillins Hives   • [place] [Nuts - Food Allergy] GI Intolerance   • Valsartan Edema      Immunizations:     Immunization History   Administered Date(s) Administered   • COVID-19 MODERNA VACC 0.5 ML IM 03/24/2021, 04/21/2021   • COVID-19 Moderna Vac BIVALENT 12 Yr+ IM (BOOSTER ONLY) 0.5 ML 10/01/2022   • INFLUENZA 10/01/2015, 09/21/2016, 10/30/2017, 10/30/2017, 11/05/2018   • Influenza Quadrivalent Preservative Free 3 years and older IM 10/01/2015   • Influenza Quadrivalent, 6-35 Months IM 11/03/2017   • Influenza Split High Dose Preservative Free IM 11/05/2018   • Influenza, high dose seasonal 0.7 mL 09/19/2019, 09/29/2020, 10/05/2021, 10/01/2022   • Influenza, seasonal, injectable 10/19/2010, 09/06/2012, 09/16/2013, 09/21/2016   • Pneumococcal Conjugate 13-Valent 03/12/2019   • Pneumococcal Polysaccharide PPV23 06/11/2020   • Tdap 01/24/2011   • Zoster 04/01/2014   • Zoster Vaccine Recombinant 04/01/2014      Health Maintenance:         Topic Date Due   • Hepatitis C Screening  02/21/2024 (Originally 1955)   • Colorectal Cancer Screening  08/19/2027         Topic Date Due   • COVID-19 Vaccine (4 - Moderna series) 02/01/2023   • Influenza Vaccine (1) 09/01/2023      Medicare Screening Tests and Risk Assessments:     Chandler Calabrese is here for his Subsequent Wellness visit. Health Risk Assessment:   Patient rates overall health as excellent. Patient feels that their physical health rating is same. Patient is satisfied with their life. Eyesight was rated as same. Hearing was rated as same. Patient feels that their emotional and mental health rating is same. Patients states they are sometimes angry. Patient states they are sometimes unusually tired/fatigued. Pain experienced in the last 7 days has been none. Patient states that he has experienced no weight loss or gain in last 6 months. Depression Screening:   PHQ-2 Score: 2      Fall Risk Screening: In the past year, patient has experienced: no history of falling in past year      Home Safety:  Patient does not have trouble with stairs inside or outside of their home. Patient has working smoke alarms and has working carbon monoxide detector. Home safety hazards include: none. Nutrition:   Current diet is Regular. Medications:   Patient is not currently taking any over-the-counter supplements. Patient is able to manage medications. Activities of Daily Living (ADLs)/Instrumental Activities of Daily Living (IADLs):   Walk and transfer into and out of bed and chair?: Yes  Dress and groom yourself?: Yes    Bathe or shower yourself?: Yes    Feed yourself?  Yes  Do your laundry/housekeeping?: Yes  Manage your money, pay your bills and track your expenses?: Yes  Make your own meals?: Yes    Do your own shopping?: Yes    Previous Hospitalizations:   Any hospitalizations or ED visits within the last 12 months?: No      Advance Care Planning:   Living will: Yes    Advanced directive: Yes    End of Life Decisions reviewed with patient: Yes    Provider agrees with end of life decisions: Yes      Cognitive Screening:   Provider or family/friend/caregiver concerned regarding cognition?: No    PREVENTIVE SCREENINGS      Cardiovascular Screening:    General: Screening Not Indicated and History Lipid Disorder      Diabetes Screening:     General: Screening Current      Colorectal Cancer Screening:     General: Screening Current      Prostate Cancer Screening:    General: Screening Current      Osteoporosis Screening:    General: Screening Not Indicated      Abdominal Aortic Aneurysm (AAA) Screening:    Risk factors include: age between 70-77 yo        General: Risks and Benefits Discussed    Due for: Screening AAA Ultrasound      Lung Cancer Screening:     General: Screening Not Indicated      Hepatitis C Screening:    General: Screening Current    Screening, Brief Intervention, and Referral to Treatment (SBIRT)    Screening  Typical number of drinks in a day: 1  Typical number of drinks in a week: 5  Interpretation: Low risk drinking behavior. Single Item Drug Screening:  How often have you used an illegal drug (including marijuana) or a prescription medication for non-medical reasons in the past year? never    Single Item Drug Screen Score: 0  Interpretation: Negative screen for possible drug use disorder    Brief Intervention  Alcohol & drug use screenings were reviewed. No concerns regarding substance use disorder identified. No results found. Physical Exam:     Vitals:    08/21/23 0913   BP: 144/82   Pulse: 76   Resp: 16   Temp: 97.8 °F (36.6 °C)   TempSrc: Temporal   SpO2: 98%   Weight: 73 kg (161 lb)   Height: 5' 7" (1.702 m)     Physical Exam  Constitutional:       Appearance: Normal appearance. HENT:      Head: Normocephalic and atraumatic. Neck:      Vascular: No carotid bruit. Cardiovascular:      Rate and Rhythm: Normal rate and regular rhythm. Pulses: Normal pulses. Heart sounds: Normal heart sounds.    Pulmonary:      Effort: Pulmonary effort is normal.      Breath sounds: Normal breath sounds. Musculoskeletal:         General: Normal range of motion. Cervical back: Normal range of motion and neck supple. Right lower leg: No edema. Left lower leg: No edema. Skin:     Comments: Right lower leg over tibial plateau healing abrasion, scab 2 mm, ecchymosis surrounding lesion, not tender. No erythema. Neurological:      General: No focal deficit present. Mental Status: He is alert and oriented to person, place, and time. Psychiatric:         Mood and Affect: Mood normal.         Behavior: Behavior normal.         Thought Content:  Thought content normal.         Judgment: Judgment normal.          CAITLIN RobbC

## 2023-08-21 NOTE — PATIENT INSTRUCTIONS
Medicare Preventive Visit Patient Instructions  Thank you for completing your Welcome to Medicare Visit or Medicare Annual Wellness Visit today. Your next wellness visit will be due in one year (8/21/2024). The screening/preventive services that you may require over the next 5-10 years are detailed below. Some tests may not apply to you based off risk factors and/or age. Screening tests ordered at today's visit but not completed yet may show as past due. Also, please note that scanned in results may not display below. Preventive Screenings:  Service Recommendations Previous Testing/Comments   Colorectal Cancer Screening  · Colonoscopy    · Fecal Occult Blood Test (FOBT)/Fecal Immunochemical Test (FIT)  · Fecal DNA/Cologuard Test  · Flexible Sigmoidoscopy Age: 43-73 years old   Colonoscopy: every 10 years (May be performed more frequently if at higher risk)  OR  FOBT/FIT: every 1 year  OR  Cologuard: every 3 years  OR  Sigmoidoscopy: every 5 years  Screening may be recommended earlier than age 39 if at higher risk for colorectal cancer. Also, an individualized decision between you and your healthcare provider will decide whether screening between the ages of 77-80 would be appropriate.  Colonoscopy: 08/19/2022  FOBT/FIT: Not on file  Cologuard: Not on file  Sigmoidoscopy: Not on file    Screening Current     Prostate Cancer Screening Individualized decision between patient and health care provider in men between ages of 53-66   Medicare will cover every 12 months beginning on the day after your 50th birthday PSA: 0.7 ng/mL     Screening Current     Hepatitis C Screening Once for adults born between 1945 and 1965  More frequently in patients at high risk for Hepatitis C Hep C Antibody: Not on file    Screening Current   Diabetes Screening 1-2 times per year if you're at risk for diabetes or have pre-diabetes Fasting glucose: 119 mg/dL (8/14/2023)  A1C: 5.9 % (8/14/2023)  Screening Current   Cholesterol Screening Once every 5 years if you don't have a lipid disorder. May order more often based on risk factors. Lipid panel: 08/14/2023  Screening Not Indicated  History Lipid Disorder      Other Preventive Screenings Covered by Medicare:  1. Abdominal Aortic Aneurysm (AAA) Screening: covered once if your at risk. You're considered to be at risk if you have a family history of AAA or a male between the age of 70-76 who smoking at least 100 cigarettes in your lifetime. 2. Lung Cancer Screening: covers low dose CT scan once per year if you meet all of the following conditions: (1) Age 48-67; (2) No signs or symptoms of lung cancer; (3) Current smoker or have quit smoking within the last 15 years; (4) You have a tobacco smoking history of at least 20 pack years (packs per day x number of years you smoked); (5) You get a written order from a healthcare provider. 3. Glaucoma Screening: covered annually if you're considered high risk: (1) You have diabetes OR (2) Family history of glaucoma OR (3)  aged 48 and older OR (3)  American aged 72 and older  3. Osteoporosis Screening: covered every 2 years if you meet one of the following conditions: (1) Have a vertebral abnormality; (2) On glucocorticoid therapy for more than 3 months; (3) Have primary hyperparathyroidism; (4) On osteoporosis medications and need to assess response to drug therapy. 5. HIV Screening: covered annually if you're between the age of 14-79. Also covered annually if you are younger than 13 and older than 72 with risk factors for HIV infection. For pregnant patients, it is covered up to 3 times per pregnancy.     Immunizations:  Immunization Recommendations   Influenza Vaccine Annual influenza vaccination during flu season is recommended for all persons aged >= 6 months who do not have contraindications   Pneumococcal Vaccine   * Pneumococcal conjugate vaccine = PCV13 (Prevnar 13), PCV15 (Vaxneuvance), PCV20 (Prevnar 20)  * Pneumococcal polysaccharide vaccine = PPSV23 (Pneumovax) Adults 2364 years old: 1-3 doses may be recommended based on certain risk factors  Adults 72 years old: 1-2 doses may be recommended based off what pneumonia vaccine you previously received   Hepatitis B Vaccine 3 dose series if at intermediate or high risk (ex: diabetes, end stage renal disease, liver disease)   Tetanus (Td) Vaccine - COST NOT COVERED BY MEDICARE PART B Following completion of primary series, a booster dose should be given every 10 years to maintain immunity against tetanus. Td may also be given as tetanus wound prophylaxis. Tdap Vaccine - COST NOT COVERED BY MEDICARE PART B Recommended at least once for all adults. For pregnant patients, recommended with each pregnancy. Shingles Vaccine (Shingrix) - COST NOT COVERED BY MEDICARE PART B  2 shot series recommended in those aged 48 and above     Health Maintenance Due:      Topic Date Due   • Hepatitis C Screening  02/21/2024 (Originally 1955)   • Colorectal Cancer Screening  08/19/2027     Immunizations Due:      Topic Date Due   • COVID-19 Vaccine (4 - Moderna series) 02/01/2023   • Influenza Vaccine (1) 09/01/2023     Advance Directives   What are advance directives? Advance directives are legal documents that state your wishes and plans for medical care. These plans are made ahead of time in case you lose your ability to make decisions for yourself. Advance directives can apply to any medical decision, such as the treatments you want, and if you want to donate organs. What are the types of advance directives? There are many types of advance directives, and each state has rules about how to use them. You may choose a combination of any of the following:  · Living will: This is a written record of the treatment you want. You can also choose which treatments you do not want, which to limit, and which to stop at a certain time. This includes surgery, medicine, IV fluid, and tube feedings. · Durable power of  for [facility]): This is a written record that states who you want to make healthcare choices for you when you are unable to make them for yourself. This person, called a proxy, is usually a family member or a friend. You may choose more than 1 proxy. · Do not resuscitate (DNR) order:  A DNR order is used in case your heart stops beating or you stop breathing. It is a request not to have certain forms of treatment, such as CPR. A DNR order may be included in other types of advance directives. · Medical directive: This covers the care that you want if you are in a coma, near death, or unable to make decisions for yourself. You can list the treatments you want for each condition. Treatment may include pain medicine, surgery, blood transfusions, dialysis, IV or tube feedings, and a ventilator (breathing machine). · Values history: This document has questions about your views, beliefs, and how you feel and think about life. This information can help others choose the care that you would choose. Why are advance directives important? An advance directive helps you control your care. Although spoken wishes may be used, it is better to have your wishes written down. Spoken wishes can be misunderstood, or not followed. Treatments may be given even if you do not want them. An advance directive may make it easier for your family to make difficult choices about your care. Weight Management   Why it is important to manage your weight:  Being overweight increases your risk of health conditions such as heart disease, high blood pressure, type 2 diabetes, and certain types of cancer. It can also increase your risk for osteoarthritis, sleep apnea, and other respiratory problems. Aim for a slow, steady weight loss. Even a small amount of weight loss can lower your risk of health problems.   How to lose weight safely:  A safe and healthy way to lose weight is to eat fewer calories and get regular exercise. You can lose up about 1 pound a week by decreasing the number of calories you eat by 500 calories each day. Healthy meal plan for weight management:  A healthy meal plan includes a variety of foods, contains fewer calories, and helps you stay healthy. A healthy meal plan includes the following:  · Eat whole-grain foods more often. A healthy meal plan should contain fiber. Fiber is the part of grains, fruits, and vegetables that is not broken down by your body. Whole-grain foods are healthy and provide extra fiber in your diet. Some examples of whole-grain foods are whole-wheat breads and pastas, oatmeal, brown rice, and bulgur. · Eat a variety of vegetables every day. Include dark, leafy greens such as spinach, kale, korey greens, and mustard greens. Eat yellow and orange vegetables such as carrots, sweet potatoes, and winter squash. · Eat a variety of fruits every day. Choose fresh or canned fruit (canned in its own juice or light syrup) instead of juice. Fruit juice has very little or no fiber. · Eat low-fat dairy foods. Drink fat-free (skim) milk or 1% milk. Eat fat-free yogurt and low-fat cottage cheese. Try low-fat cheeses such as mozzarella and other reduced-fat cheeses. · Choose meat and other protein foods that are low in fat. Choose beans or other legumes such as split peas or lentils. Choose fish, skinless poultry (chicken or turkey), or lean cuts of red meat (beef or pork). Before you cook meat or poultry, cut off any visible fat. · Use less fat and oil. Try baking foods instead of frying them. Add less fat, such as margarine, sour cream, regular salad dressing and mayonnaise to foods. Eat fewer high-fat foods. Some examples of high-fat foods include french fries, doughnuts, ice cream, and cakes. · Eat fewer sweets. Limit foods and drinks that are high in sugar. This includes candy, cookies, regular soda, and sweetened drinks.   Exercise:  Exercise at least 30 minutes per day on most days of the week. Some examples of exercise include walking, biking, dancing, and swimming. You can also fit in more physical activity by taking the stairs instead of the elevator or parking farther away from stores. Ask your healthcare provider about the best exercise plan for you. © Copyright [employer] 2018 Information is for End User's use only and may not be sold, redistributed or otherwise used for commercial purposes.  All illustrations and images included in CareNotes® are the copyrighted property of A.D.A.M., Inc. or  [employer]

## 2023-08-21 NOTE — TELEPHONE ENCOUNTER
Aisha Cohen [facility] [place] patient to let him know physical therapy referral has been entered.

## 2023-09-05 DIAGNOSIS — I10 ESSENTIAL HYPERTENSION: ICD-10-CM

## 2023-09-05 RX ORDER — HYDROCHLOROTHIAZIDE 12.5 MG/1
TABLET ORAL
Qty: 90 TABLET | Refills: 0 | Status: SHIPPED | OUTPATIENT
Start: 2023-09-05

## 2023-11-29 DIAGNOSIS — I10 ESSENTIAL HYPERTENSION: ICD-10-CM

## 2023-11-29 RX ORDER — HYDROCHLOROTHIAZIDE 12.5 MG/1
TABLET ORAL
Qty: 90 TABLET | Refills: 0 | Status: SHIPPED | OUTPATIENT
Start: 2023-11-29

## 2023-12-13 ENCOUNTER — EVALUATION (OUTPATIENT)
Dept: PHYSICAL THERAPY | Facility: CLINIC | Age: 68
End: 2023-12-13
Payer: COMMERCIAL

## 2023-12-13 DIAGNOSIS — M54.12 CERVICAL RADICULOPATHY: Primary | ICD-10-CM

## 2023-12-13 PROCEDURE — 97140 MANUAL THERAPY 1/> REGIONS: CPT | Performed by: PHYSICAL THERAPIST

## 2023-12-13 PROCEDURE — 97161 PT EVAL LOW COMPLEX 20 MIN: CPT | Performed by: PHYSICAL THERAPIST

## 2023-12-13 NOTE — PROGRESS NOTES
PT Evaluation     Today's date: 2023  Patient name: Yina Khan. : 1955  MRN: [id number]  Referring provider: Joana Roy Ala*  Dx:   Encounter Diagnosis     ICD-10-CM    1. Cervical radiculopathy  M54.12                      Assessment  Assessment details: Pt is 65yo male presenting to therapy with cervical radiculopathy Rt>Lt. Pt responded well with extension biased with retraction overpressure as well. Due to symptoms pt is limited with ADL's and holding items. Pt would benefit from PT services to improve rom, strength, and radicular symptoms to return to PLOF. Impairments: abnormal or restricted ROM, activity intolerance, impaired physical strength, lacks appropriate home exercise program and pain with function  Functional limitations: playing guitar; holding  Symptom irritability: lowUnderstanding of Dx/Px/POC: good   Prognosis: good    Goals  1. Pt will be independent with HEP upon discharge. 2. Pt will abolish radicular symptoms with exercises. 3. Pt will improve UE strength to 5/5.  4. Pt will be able to play guitar without difficulty. Plan  Patient would benefit from: skilled physical therapy  Planned therapy interventions: functional ROM exercises, therapeutic activities, therapeutic exercise, therapeutic training, stretching, strengthening, home exercise program, neuromuscular re-education, manual therapy and patient education  Frequency: 2x week  Duration in weeks: 6  Treatment plan discussed with: patient    Subjective Evaluation    History of Present Illness  Mechanism of injury: Around , Pt reports his riding mower broke down and had to push mow his lawn 2 acres, rough ground. Pt likes to play guitar. He gets tingling into both forearm. Numbness into Rt index finger. Pt started doing some UT/levator stretches, isometrics, chin tucks, with SNAG rotation. Pt notes improvement with riding his hybrid bike as well as his motorcycle.  Symptoms are the best in the morning.   Quality of life: good    Patient Goals  Patient goals for therapy: independence with ADLs/IADLs  Patient goal: reduce numbness in index finger  Pain  At worst pain ratin  Location: neck, forearms, Rt pointer finger  Quality: burning (tingling)  Relieving factors: change in position (movement)  Progression: no change    Exercise history: walks 4 mile/day; plays [employer]      Objective     Postural Observations  Seated posture: fair  Standing posture: fair  Correction of posture: has no consistent effect      Palpation     Additional Palpation Details  Tenderness to Rt 1st rib    Active Range of Motion   Cervical/Thoracic Spine       Cervical    Flexion: 45 degrees   Extension: 35 degrees      Left lateral flexion:  WFL  Right lateral flexion:  Restriction level moderate  Left rotation:  Restriction level: minimal  Right rotation:  Restriction level: minimal  Mechanical Assessment    Cervical    Seated retraction: repeated movements   Pain location: no change  Supine retractation: repeated movements  Pain location: centralized  Pain intensity: better  Supine extension: repeated movements  Pain location: centralized  Pain intensity: better    Thoracic      Lumbar      Strength/Myotome Testing     Left Shoulder     Planes of Motion   Flexion: 5   Abduction: 5     Right Shoulder     Planes of Motion   Flexion: 5   Abduction: 5     Left Elbow   Flexion: 5  Extension: 5    Right Elbow   Flexion: 5  Extension: 5             Precautions: none      Manuals             Retraction with OP FH 50% 10x            Ext with Retraction FH 10x            Cerivcal Traction                          Neuro Re-Ed             Scap Retraction             Rows             Sh Ext             I,T,Y, W's prone                                                    Ther Ex             Thoracic Ext             Pec stretch             Open Book stretch                                                    UBE retro Ther Activity                                       Gait Training                                       Modalities

## 2023-12-18 ENCOUNTER — OFFICE VISIT (OUTPATIENT)
Dept: PHYSICAL THERAPY | Facility: CLINIC | Age: 68
End: 2023-12-18
Payer: COMMERCIAL

## 2023-12-18 DIAGNOSIS — M54.12 CERVICAL RADICULOPATHY: ICD-10-CM

## 2023-12-18 PROCEDURE — 97110 THERAPEUTIC EXERCISES: CPT | Performed by: PHYSICAL THERAPIST

## 2023-12-18 PROCEDURE — 97112 NEUROMUSCULAR REEDUCATION: CPT | Performed by: PHYSICAL THERAPIST

## 2023-12-18 PROCEDURE — 97140 MANUAL THERAPY 1/> REGIONS: CPT | Performed by: PHYSICAL THERAPIST

## 2023-12-18 NOTE — PROGRESS NOTES
Daily Note     Today's date: 2023  Patient name: Ayad Peacock Jr.  : 1955  MRN: [id number]  Referring provider: Debi Rosenberg P*  Dx:   Encounter Diagnosis     ICD-10-CM    1. Cervical radiculopathy  M54.12 Ambulatory Referral to Physical Therapy                     Subjective: Pt reports good improvement about 2 days later.      Objective: See treatment diary below      Assessment: Pt is tolerating manual very well, feeling more of a musclar stretch with Rt UT than N/T. Pt tolerating strengthening exercises very well. Continue to progress as able.      Plan: Continue per plan of care.      Precautions: none      Manuals            Retraction with OP FH 50% 10x FH 75% 10x           Ext with Retraction FH 10x Fh 10x           Cerivcal Traction                          Neuro Re-Ed             Scap Retraction  10x10''           Rows  2x10 black           Sh Ext  2x10 black           I,T,Y, W's prone  15xea           Chin tuck  10x5''                                     Ther Ex             Thoracic Ext  10x5-10''           Pec stretch  3x30''           Open Book stretch  10x5''ea                                                  UBE retro  6'                        Ther Activity                                       Gait Training                                       Modalities

## 2023-12-20 ENCOUNTER — OFFICE VISIT (OUTPATIENT)
Dept: PHYSICAL THERAPY | Facility: CLINIC | Age: 68
End: 2023-12-20
Payer: COMMERCIAL

## 2023-12-20 DIAGNOSIS — M54.12 CERVICAL RADICULOPATHY: Primary | ICD-10-CM

## 2023-12-20 PROCEDURE — 97140 MANUAL THERAPY 1/> REGIONS: CPT | Performed by: PHYSICAL THERAPIST

## 2023-12-20 PROCEDURE — 97110 THERAPEUTIC EXERCISES: CPT | Performed by: PHYSICAL THERAPIST

## 2023-12-20 PROCEDURE — 97112 NEUROMUSCULAR REEDUCATION: CPT | Performed by: PHYSICAL THERAPIST

## 2023-12-20 NOTE — PROGRESS NOTES
Daily Note     Today's date: 2023  Patient name: Ayad Peacock Jr.  : 1955  MRN: [id number]  Referring provider: Debi Rosenberg P*  Dx:   Encounter Diagnosis     ICD-10-CM    1. Cervical radiculopathy  M54.12                      Subjective: Pt reports his fingers felt congested when trying to play the guitar.      Objective: See treatment diary below      Assessment: Pt tolerating exercises well. Fatigues with scap stability exercises but reports no pain throughout session. Decreased band resistance with shoulder extension for better form.      Plan: Continue per plan of care.      Precautions: none      Manuals           Retraction with OP FH 50% 10x FH 75% 10x % 10x          Ext with Retraction FH 10x Fh 10x FH 10x          Cerivcal Traction   FH                       Neuro Re-Ed             Scap Retraction  10x10'' 10x10''          Rows  2x10 black 2x10 black          Sh Ext  2x10 black 2x10 blue          I,T,Y, W's prone  15xea 15xea          Chin tuck  10x5'' 10x5''                                    Ther Ex             Thoracic Ext  10x5-10'' 10x5''          Pec stretch  3x30'' 3x30''          Open Book stretch  10x5''ea 10x5''ea                                                 UBE retro  6' 6'                       Ther Activity                                       Gait Training                                       Modalities

## 2023-12-27 ENCOUNTER — OFFICE VISIT (OUTPATIENT)
Dept: PHYSICAL THERAPY | Facility: CLINIC | Age: 68
End: 2023-12-27
Payer: COMMERCIAL

## 2023-12-27 DIAGNOSIS — M54.12 CERVICAL RADICULOPATHY: Primary | ICD-10-CM

## 2023-12-27 PROCEDURE — 97110 THERAPEUTIC EXERCISES: CPT

## 2023-12-27 PROCEDURE — 97140 MANUAL THERAPY 1/> REGIONS: CPT

## 2023-12-27 PROCEDURE — 97112 NEUROMUSCULAR REEDUCATION: CPT

## 2023-12-27 NOTE — PROGRESS NOTES
"Daily Note     Today's date: 2023  Patient name: Ayad Peacock Jr.  : 1955  MRN: [id number]  Referring provider: Debi Rosenberg P*  Dx:   Encounter Diagnosis     ICD-10-CM    1. Cervical radiculopathy  M54.12           Start Time: 0757  Stop Time: 0842  Total time in clinic (min): 45 minutes    Subjective: Pt reports he continues to experience increased stiffness and numbness, described as \"congestion\" and \"thick, in both hands.  Has been performing self pec stretch in doorway at home which he feels like has been helping.        Objective: See treatment diary below      Assessment: Tolerated treatment well. Continued with program as outlined below.  Most challenged with shldr ext movements, both prone and TB resisted in standing, with noticeable weakness in RUE compared to LUE.  UT compensation with TB resisted ext as he begins to fatigue.  Patient would benefit from continued PT to further reduce intensity/frequency of radicular symptoms and maximize overall function with ADL's.        Plan: Continue per plan of care.      Precautions: none      Manuals          Retraction with OP FH 50% 10x FH 75% 10x % 10x AFB  10x         Ext with Retraction FH 10x Fh 10x FH 10x AFB  10x         Cerivcal Traction   FH AFB                      Neuro Re-Ed             Scap Retraction  10x10'' 10x10''          Rows  2x10 black 2x10 black 2x10 black         Sh Ext  2x10 black 2x10 blue 2x10 blue         I,T,Y, W's prone  15xea 15xea 15xea         Chin tuck  10x5'' 10x5'' 10x5\"                                   Ther Ex             Thoracic Ext  10x5-10'' 10x5'' 10x5''         Pec stretch  3x30'' 3x30'' 3x30''         Open Book stretch  10x5''ea 10x5''ea 10x5''ea                                                UBE retro  6' 6' 6'                      Ther Activity                                       Gait Training                                       Modalities                        "

## 2023-12-29 ENCOUNTER — OFFICE VISIT (OUTPATIENT)
Dept: PHYSICAL THERAPY | Facility: CLINIC | Age: 68
End: 2023-12-29
Payer: COMMERCIAL

## 2023-12-29 DIAGNOSIS — M54.12 CERVICAL RADICULOPATHY: Primary | ICD-10-CM

## 2023-12-29 PROCEDURE — 97112 NEUROMUSCULAR REEDUCATION: CPT

## 2023-12-29 PROCEDURE — 97140 MANUAL THERAPY 1/> REGIONS: CPT

## 2023-12-29 PROCEDURE — 97110 THERAPEUTIC EXERCISES: CPT

## 2023-12-29 NOTE — PROGRESS NOTES
"Daily Note     Today's date: 2023  Patient name: Ayad Peacock Jr.  : 1955  MRN: [id number]  Referring provider: Debi Rosenberg P*  Dx:   Encounter Diagnosis     ICD-10-CM    1. Cervical radiculopathy  M54.12                      Subjective: Pt reports his hands cont to feel \"thick and congested.\"    Objective: See treatment diary below    Assessment: Performed exercise program w/o complaint. Required occasional vc'ing during same. Comfortable throughout manual therapies. Tolerated treatment well. Will monitor. Issued updated written HEP instructions, reviewed same w/pt.      Plan: Continue per plan of care.      Precautions: none      Manuals         Retraction with OP FH 50% 10x FH 75% 10x % 10x AFB  10x MO  10x        Ext with Retraction FH 10x Fh 10x FH 10x AFB  10x MO  10x        Cerivcal Traction   FH AFB MO                     Neuro Re-Ed             Scap Retraction  10x10'' 10x10''  HEP        Rows  2x10 black 2x10 black 2x10 black 2x10  Black         Sh Ext  2x10 black 2x10 blue 2x10 blue 2x10 Blue         I,T,Y, W's prone  15xea 15xea 15xea 15x ea        Chin tuck  10x5'' 10x5'' 10x5\" 10x5\"                                  Ther Ex             Thoracic Ext  10x5-10'' 10x5'' 10x5'' 10x5\"        Pec stretch  3x30'' 3x30'' 3x30'' 3x30\"        Open Book stretch  10x5''ea 10x5''ea 10x5''ea 10x5\"   ea                                               UBE retro  6' 6' 6' 6'                     Ther Activity                                       Gait Training                                       Modalities                                                  "

## 2024-01-02 ENCOUNTER — OFFICE VISIT (OUTPATIENT)
Dept: PHYSICAL THERAPY | Facility: CLINIC | Age: 69
End: 2024-01-02
Payer: COMMERCIAL

## 2024-01-02 DIAGNOSIS — M54.12 CERVICAL RADICULOPATHY: Primary | ICD-10-CM

## 2024-01-02 PROCEDURE — 97110 THERAPEUTIC EXERCISES: CPT

## 2024-01-02 PROCEDURE — 97112 NEUROMUSCULAR REEDUCATION: CPT

## 2024-01-02 PROCEDURE — 97140 MANUAL THERAPY 1/> REGIONS: CPT

## 2024-01-02 NOTE — PROGRESS NOTES
"Daily Note     Today's date: 2024  Patient name: Ayad Peacock Jr.  : 1955  MRN: [id number]  Referring provider: Debi Rosenberg P*  Dx:   Encounter Diagnosis     ICD-10-CM    1. Cervical radiculopathy  M54.12                      Subjective: Pt reports his neck feels good, \"tightness and congestion\" in his hands cont.Notes this morning after performing doorway stretch his R index finger was numb.    Objective: See treatment diary below    Assessment: Performed exercise program w/o difficulty or increasing symptoms. Cont to respond well to manual therapies. Relief after tx. Tolerated treatment well. Will monitor.    Plan: Continue per plan of care.      Precautions: none      Manuals 24       Retraction with OP FH 50% 10x FH 75% 10x % 10x AFB  10x MO  10x MO  10x       Ext with Retraction FH 10x Fh 10x FH 10x AFB  10x MO  10x MO  10x       Cerivcal Traction   FH AFB MO MO                    Neuro Re-Ed             Scap Retraction  10x10'' 10x10''  HEP HEP       Rows  2x10 black 2x10 black 2x10 black 2x10  Black  2x10 Black       Sh Ext  2x10 black 2x10 blue 2x10 blue 2x10 Blue  2x10 Blue        I,T,Y, W's prone  15xea 15xea 15xea 15x ea 15x  ea       Chin tuck  10x5'' 10x5'' 10x5\" 10x5\" 10x5\"                                 Ther Ex             Thoracic Ext  10x5-10'' 10x5'' 10x5'' 10x5\" 10x5\"       Pec stretch  3x30'' 3x30'' 3x30'' 3x30\" 3x30\"       Open Book stretch  10x5''ea 10x5''ea 10x5''ea 10x5\"   ea 10x5\"  ea                                              UBE retro  6' 6' 6' 6' 6'                    Ther Activity                                       Gait Training                                       Modalities                                                    "

## 2024-01-04 ENCOUNTER — OFFICE VISIT (OUTPATIENT)
Dept: PHYSICAL THERAPY | Facility: CLINIC | Age: 69
End: 2024-01-04
Payer: COMMERCIAL

## 2024-01-04 DIAGNOSIS — M54.12 CERVICAL RADICULOPATHY: Primary | ICD-10-CM

## 2024-01-04 PROCEDURE — 97112 NEUROMUSCULAR REEDUCATION: CPT | Performed by: PHYSICAL THERAPIST

## 2024-01-04 PROCEDURE — 97140 MANUAL THERAPY 1/> REGIONS: CPT | Performed by: PHYSICAL THERAPIST

## 2024-01-04 PROCEDURE — 97110 THERAPEUTIC EXERCISES: CPT | Performed by: PHYSICAL THERAPIST

## 2024-01-04 NOTE — PROGRESS NOTES
"Daily Note     Today's date: 2024  Patient name: Ayad Peacock Jr.  : 1955  MRN: [id number]  Referring provider: Debi Rosenberg P*  Dx:   Encounter Diagnosis     ICD-10-CM    1. Cervical radiculopathy  M54.12                      Subjective: Pt reports after last PT visit he had a significant amount of relief of symptoms lasting the rest of the day and some into the next day. He arrives with some stiffness/swelling sensation in fingers.       Objective: See treatment diary below      Assessment: Continued with plan of care as pt is responding well to treatments. Trialed gentle side glides to further mobilize nerve centrally with improvements in sensation of his fingers post. Pt educated to monitor symptom improvement and to continue NV if better. Pt will benefit from continued skilled PT.       Plan: Continue per plan of care.      Precautions: none      Manuals 24      Retraction with OP FH 50% 10x FH 75% 10x % 10x AFB  10x MO  10x MO  10x DL 10x      Ext with Retraction FH 10x Fh 10x FH 10x AFB  10x MO  10x MO  10x DL 10x      Cerivcal Traction   FH AFB MO MO DL      Central cervical gliders (side glide)       DL B      Neuro Re-Ed             Scap Retraction  10x10'' 10x10''  HEP HEP       Rows  2x10 black 2x10 black 2x10 black 2x10  Black  2x10 Black 2x10 Black      Sh Ext  2x10 black 2x10 blue 2x10 blue 2x10 Blue  2x10 Blue  2x10 Blue      I,T,Y, W's prone  15xea 15xea 15xea 15x ea 15x  ea 15x  ea      Chin tuck  10x5'' 10x5'' 10x5\" 10x5\" 10x5\" 10x5\"                                Ther Ex             Thoracic Ext  10x5-10'' 10x5'' 10x5'' 10x5\" 10x5\" 10x5\"      Pec stretch  3x30'' 3x30'' 3x30'' 3x30\" 3x30\" 3x30\"      Open Book stretch  10x5''ea 10x5''ea 10x5''ea 10x5\"   ea 10x5\"  ea 10x5\"  ea                                             UBE retro  6' 6' 6' 6' 6' 6'                   Ther Activity                                       Gait Training     "                                   Modalities

## 2024-01-09 ENCOUNTER — OFFICE VISIT (OUTPATIENT)
Dept: PHYSICAL THERAPY | Facility: CLINIC | Age: 69
End: 2024-01-09
Payer: COMMERCIAL

## 2024-01-09 DIAGNOSIS — M54.12 CERVICAL RADICULOPATHY: Primary | ICD-10-CM

## 2024-01-09 PROCEDURE — 97110 THERAPEUTIC EXERCISES: CPT

## 2024-01-09 PROCEDURE — 97140 MANUAL THERAPY 1/> REGIONS: CPT

## 2024-01-09 PROCEDURE — 97112 NEUROMUSCULAR REEDUCATION: CPT

## 2024-01-09 NOTE — PROGRESS NOTES
"Daily Note     Today's date: 2024  Patient name: Ayad Peacock Jr.  : 1955  MRN: [id number]  Referring provider: Debi Rosenberg P*  Dx:   Encounter Diagnosis     ICD-10-CM    1. Cervical radiculopathy  M54.12                      Subjective: Pt is able to \"endure\" the congested feeling in his hands, since central cerv glides LV. Pt has been able to hold pick for 6 string guitar.    Objective: See treatment diary below    Assessment: Performed exercise program w/o complaint. Required some vc'ing during same. Cont to respond well to manual therapies. Tolerated treatment well. Will monitor.    Plan: Continue per plan of care.      Precautions: none      Manuals 24     Retraction with OP FH 50% 10x FH 75% 10x % 10x AFB  10x MO  10x MO  10x DL 10x MO  10x     Ext with Retraction FH 10x Fh 10x FH 10x AFB  10x MO  10x MO  10x DL 10x MO  10x     Cerivcal Traction   FH AFB MO MO DL MO     Central cervical gliders (side glide)       DL B DL- B     Neuro Re-Ed             Scap Retraction  10x10'' 10x10''  HEP HEP       Rows  2x10 black 2x10 black 2x10 black 2x10  Black  2x10 Black 2x10 Black 20x  Black      Sh Ext  2x10 black 2x10 blue 2x10 blue 2x10 Blue  2x10 Blue  2x10 Blue 20x  Blue      I,T,Y, W's prone  15xea 15xea 15xea 15x ea 15x  ea 15x  ea 15x ea     Chin tuck  10x5'' 10x5'' 10x5\" 10x5\" 10x5\" 10x5\" 10x5\"                               Ther Ex             Thoracic Ext  10x5-10'' 10x5'' 10x5'' 10x5\" 10x5\" 10x5\" 10x5\"     Pec stretch  3x30'' 3x30'' 3x30'' 3x30\" 3x30\" 3x30\" 3x30\"     Open Book stretch  10x5''ea 10x5''ea 10x5''ea 10x5\"   ea 10x5\"  ea 10x5\"  ea 10x5\" ea                                            UBE retro  6' 6' 6' 6' 6' 6' 6'                  Ther Activity                                       Gait Training                                       Modalities                                                        "

## 2024-01-11 ENCOUNTER — OFFICE VISIT (OUTPATIENT)
Dept: PHYSICAL THERAPY | Facility: CLINIC | Age: 69
End: 2024-01-11
Payer: COMMERCIAL

## 2024-01-11 DIAGNOSIS — M54.12 CERVICAL RADICULOPATHY: Primary | ICD-10-CM

## 2024-01-11 PROCEDURE — 97112 NEUROMUSCULAR REEDUCATION: CPT

## 2024-01-11 PROCEDURE — 97140 MANUAL THERAPY 1/> REGIONS: CPT

## 2024-01-11 PROCEDURE — 97110 THERAPEUTIC EXERCISES: CPT

## 2024-01-11 NOTE — PROGRESS NOTES
"Daily Note     Today's date: 2024  Patient name: Ayad Peacock Jr.  : 1955  MRN: [id number]  Referring provider: Debi Rosenberg P*  Dx:   Encounter Diagnosis     ICD-10-CM    1. Cervical radiculopathy  M54.12                      Subjective: \"It went the other way\" after LV. Notes he feels the side glides felt different.    Objective: See treatment diary below    Assessment: Performed exercise progressions w/o difficulty or discomfort. Responded well to manual therapies. Tolerated treatment well. Will monitor.    Plan: Continue per plan of care.      Precautions: none      Manuals 24    Retraction with OP FH 50% 10x FH 75% 10x % 10x AFB  10x MO  10x MO  10x DL 10x MO  10x MO  15x    Ext with Retraction FH 10x Fh 10x FH 10x AFB  10x MO  10x MO  10x DL 10x MO  10x MO  15x    Cerivcal Traction   FH AFB MO MO DL MO MO    Central cervical gliders (side glide)       DL B DL- B MD- B    Neuro Re-Ed             Scap Retraction  10x10'' 10x10''  HEP HEP       Rows  2x10 black 2x10 black 2x10 black 2x10  Black  2x10 Black 2x10 Black 20x  Black  20x  Black    Sh Ext  2x10 black 2x10 blue 2x10 blue 2x10 Blue  2x10 Blue  2x10 Blue 20x  Blue  20x Blue      I,T,Y, W's prone  15xea 15xea 15xea 15x ea 15x  ea 15x  ea 15x ea I 15x Y,T, W  20x    Chin tuck  10x5'' 10x5'' 10x5\" 10x5\" 10x5\" 10x5\" 10x5\" 10x5\"                              Ther Ex             Thoracic Ext  10x5-10'' 10x5'' 10x5'' 10x5\" 10x5\" 10x5\" 10x5\" 10x5\"    Pec stretch  3x30'' 3x30'' 3x30'' 3x30\" 3x30\" 3x30\" 3x30\" 3x30\"    Open Book stretch  10x5''ea 10x5''ea 10x5''ea 10x5\"   ea 10x5\"  ea 10x5\"  ea 10x5\" ea 10x5\"  harley                                           UBE retro  6' 6' 6' 6' 6' 6' 6' 6'                 Ther Activity                                       Gait Training                                       Modalities                                                          "

## 2024-01-16 ENCOUNTER — OFFICE VISIT (OUTPATIENT)
Dept: PHYSICAL THERAPY | Facility: CLINIC | Age: 69
End: 2024-01-16
Payer: COMMERCIAL

## 2024-01-16 DIAGNOSIS — M54.12 CERVICAL RADICULOPATHY: Primary | ICD-10-CM

## 2024-01-16 PROCEDURE — 97112 NEUROMUSCULAR REEDUCATION: CPT | Performed by: PHYSICAL THERAPIST

## 2024-01-16 PROCEDURE — 97110 THERAPEUTIC EXERCISES: CPT | Performed by: PHYSICAL THERAPIST

## 2024-01-16 PROCEDURE — 97140 MANUAL THERAPY 1/> REGIONS: CPT | Performed by: PHYSICAL THERAPIST

## 2024-01-16 NOTE — PROGRESS NOTES
"Daily Note     Today's date: 2024  Patient name: Ayad Peacock Jr.  : 1955  MRN: [id number]  Referring provider: Debi Rosenberg P*  Dx:   Encounter Diagnosis     ICD-10-CM    1. Cervical radiculopathy  M54.12                      Subjective: Pt reports he has a couple days of relief after PT sessions. He finds that the manual techniques help.       Objective: See treatment diary below      Assessment: Initiated thoracic foam protocol with no complaints, will monitor pt's response to this. Also tolerated progressions in periscapular strengthening. Continued with exercises as listed with pt responding well to manuals. Pt will benefit from continued skilled PT.       Plan: Continue per plan of care.      Precautions: none      Manuals 24   Retraction with OP FH 50% 10x FH 75% 10x % 10x AFB  10x MO  10x MO  10x DL 10x MO  10x MO  15x DL 10x   Ext with Retraction FH 10x Fh 10x FH 10x AFB  10x MO  10x MO  10x DL 10x MO  10x MO  15x DL 10x   Cerivcal Traction   FH AFB MO MO DL MO MO DL   Central cervical gliders (side glide)       DL B DL- B MD- B DL B   Neuro Re-Ed             Scap Retraction  10x10'' 10x10''  HEP HEP       Rows  2x10 black 2x10 black 2x10 black 2x10  Black  2x10 Black 2x10 Black 20x  Black  20x  Black [place] 17R 20x   Sh Ext  2x10 black 2x10 blue 2x10 blue 2x10 Blue  2x10 Blue  2x10 Blue 20x  Blue  20x Blue   [place] 8R 20x   I,T,Y, W's prone  15xea 15xea 15xea 15x ea 15x  ea 15x  ea 15x ea I 15x Y,T, W  20x    Chin tuck  10x5'' 10x5'' 10x5\" 10x5\" 10x5\" 10x5\" 10x5\" 10x5\" 10x5\"                             Ther Ex             Thoracic Ext  10x5-10'' 10x5'' 10x5'' 10x5\" 10x5\" 10x5\" 10x5\" 10x5\" 10x10\"   Pec stretch  3x30'' 3x30'' 3x30'' 3x30\" 3x30\" 3x30\" 3x30\" 3x30\" 3x30\"   Open Book stretch  10x5''ea 10x5''ea 10x5''ea 10x5\"   ea 10x5\"  ea 10x5\"  ea 10x5\" ea 10x5\"  ea 10x5\"   Thoracic foam protocol          Hugs, angels, scap rolls " 15x ea                             UBE retro  6' 6' 6' 6' 6' 6' 6' 6' 6'                Ther Activity                                       Gait Training                                       Modalities

## 2024-01-17 ENCOUNTER — TELEPHONE (OUTPATIENT)
Dept: FAMILY MEDICINE CLINIC | Facility: CLINIC | Age: 69
End: 2024-01-17

## 2024-01-17 DIAGNOSIS — R53.83 FATIGUE, UNSPECIFIED TYPE: ICD-10-CM

## 2024-01-17 DIAGNOSIS — I10 ESSENTIAL HYPERTENSION: ICD-10-CM

## 2024-01-17 DIAGNOSIS — Z12.5 SCREENING PSA (PROSTATE SPECIFIC ANTIGEN): ICD-10-CM

## 2024-01-17 DIAGNOSIS — E78.49 OTHER HYPERLIPIDEMIA: ICD-10-CM

## 2024-01-17 DIAGNOSIS — R73.9 ELEVATED BLOOD SUGAR: Primary | ICD-10-CM

## 2024-01-17 NOTE — TELEPHONE ENCOUNTER
Patient is looking for a script for blood work to get done prior to his an appointment with you on 02/23.

## 2024-01-18 ENCOUNTER — OFFICE VISIT (OUTPATIENT)
Dept: PHYSICAL THERAPY | Facility: CLINIC | Age: 69
End: 2024-01-18
Payer: COMMERCIAL

## 2024-01-18 DIAGNOSIS — M54.12 CERVICAL RADICULOPATHY: Primary | ICD-10-CM

## 2024-01-18 PROCEDURE — 97112 NEUROMUSCULAR REEDUCATION: CPT

## 2024-01-18 PROCEDURE — 97140 MANUAL THERAPY 1/> REGIONS: CPT

## 2024-01-18 PROCEDURE — 97110 THERAPEUTIC EXERCISES: CPT

## 2024-01-18 NOTE — PROGRESS NOTES
"Daily Note     Today's date: 2024  Patient name: Ayad Peacock Jr.  : 1955  MRN: [id number]  Referring provider: Debi Rosenberg P*  Dx:   Encounter Diagnosis     ICD-10-CM    1. Cervical radiculopathy  M54.12                      Subjective: Pt reports he feels TB exercises are more effective then using May machine, adding the handles feel \"knarwly.\"    Objective: See treatment diary below    Assessment: Performed exercise program w/o difficulty. Responded well to manual therapies. Tolerated treatment well. Will monitor.    Plan: Continue per plan of care.      Precautions: none      Manuals 24   Retraction with OP MO  10x  % 10x AFB  10x MO  10x MO  10x DL 10x MO  10x MO  15x DL 10x   Ext with Retraction MO 10x  FH 10x AFB  10x MO  10x MO  10x DL 10x MO  10x MO  15x DL 10x   Cerivcal Traction MO  FH AFB MO MO DL MO MO DL   Central cervical gliders (side glide) FH  B      DL B DL- B MD- B DL B   Neuro Re-Ed             Scap Retraction   10x10''  HEP HEP       Rows [place]  17R 20x  2x10 black 2x10 black 2x10  Black  2x10 Black 2x10 Black 20x  Black  20x  Black [place] 17R 20x   Sh Ext May 8R 20x  2x10 blue 2x10 blue 2x10 Blue  2x10 Blue  2x10 Blue 20x  Blue  20x Blue   [place] 8R 20x   I,T,Y, W's prone   15xea 15xea 15x ea 15x  ea 15x  ea 15x ea I 15x Y,T, W  20x    Chin tuck 10x5\"  10x5'' 10x5\" 10x5\" 10x5\" 10x5\" 10x5\" 10x5\" 10x5\"                             Ther Ex             Thoracic Ext 10x10\"  10x5'' 10x5'' 10x5\" 10x5\" 10x5\" 10x5\" 10x5\" 10x10\"   Pec stretch 3x30\"  3x30'' 3x30'' 3x30\" 3x30\" 3x30\" 3x30\" 3x30\" 3x30\"   Open Book stretch 10x5\"  ea  10x5''ea 10x5''ea 10x5\"   ea 10x5\"  ea 10x5\"  ea 10x5\" ea 10x5\"  ea 10x5\"   Thoracic foam protocol Latoya, michael, scap rolls 15x ea         michael Klein, scap rolls 15x ea                             UBE retro 6'  6' 6' 6' 6' 6' 6' 6' 6'                Ther Activity                                   "     Gait Training                                       Modalities

## 2024-01-19 ENCOUNTER — APPOINTMENT (OUTPATIENT)
Dept: PHYSICAL THERAPY | Facility: CLINIC | Age: 69
End: 2024-01-19
Payer: COMMERCIAL

## 2024-01-23 ENCOUNTER — OFFICE VISIT (OUTPATIENT)
Dept: PHYSICAL THERAPY | Facility: CLINIC | Age: 69
End: 2024-01-23
Payer: COMMERCIAL

## 2024-01-23 DIAGNOSIS — M54.12 CERVICAL RADICULOPATHY: Primary | ICD-10-CM

## 2024-01-23 PROCEDURE — 97110 THERAPEUTIC EXERCISES: CPT | Performed by: PHYSICAL THERAPIST

## 2024-01-23 PROCEDURE — 97140 MANUAL THERAPY 1/> REGIONS: CPT | Performed by: PHYSICAL THERAPIST

## 2024-01-23 PROCEDURE — 97164 PT RE-EVAL EST PLAN CARE: CPT | Performed by: PHYSICAL THERAPIST

## 2024-01-23 NOTE — PROGRESS NOTES
"PT Re-Evaluation     Today's date: 2024  Patient name: Ayad Peacock Jr.  : 1955  MRN: [id number]  Referring provider: Debi Rosenberg P*  Dx:   Encounter Diagnosis     ICD-10-CM    1. Cervical radiculopathy  M54.12                           Assessment  Assessment details: Pt is 67yo male presenting to therapy with cervical radiculopathy Rt>Lt. Pt has responded greatly to therapy showing improvement with burning and symptoms in forearms and neck and not dropping things anymore. Pt still has some \"congestion in hands\" which we discussed may be related more to OA. Rt index finger is less numb but does improve with exercises. Pt would continue to benefit from PT services to improve rom, strength, and Rt index finger radicular symptoms to return to PLOF.  Impairments: abnormal or restricted ROM, activity intolerance, impaired physical strength, lacks appropriate home exercise program and pain with function  Functional limitations: playing guitar; holding  Symptom irritability: lowUnderstanding of Dx/Px/POC: good   Prognosis: good    Goals  1. Pt will be independent with HEP upon discharge. Progressing  2. Pt will abolish radicular symptoms with exercises. Progressing  3. Pt will improve UE strength to 5/5.  Met minus external rotation  4. Pt will be able to play guitar without difficulty. Progressing    Plan  Patient would benefit from: skilled physical therapy  Planned therapy interventions: functional ROM exercises, therapeutic activities, therapeutic exercise, therapeutic training, stretching, strengthening, home exercise program, neuromuscular re-education, manual therapy and patient education  Frequency: 2x week  Duration in weeks: 4  Treatment plan discussed with: patient    Subjective Evaluation    History of Present Illness  Mechanism of injury: Around , Pt reports his riding mower broke down and had to push mow his lawn 2 acres, rough ground. Pt likes to play guitar. He gets tingling into " both forearm. Numbness into Rt index finger. Pt started doing some UT/levator stretches, isometrics, chin tucks, with SNAG rotation. Pt notes improvement with riding his hybrid bike as well as his motorcycle. Symptoms are the best in the morning.    Pt has greatly improved with burning with forearms and neck. Pt still reports congestion in bilateral hands and Rt index finger.  Quality of life: good    Patient Goals  Patient goals for therapy: independence with ADLs/IADLs  Patient goal: reduce numbness in index finger  Pain  At worst pain ratin  Location: neck, forearms, Rt pointer finger  Quality: burning (tingling)  Relieving factors: change in position (movement)  Progression: no change    Exercise history: walks 4 mile/day; plays [employer]      Objective     Postural Observations  Seated posture: fair  Standing posture: fair  Correction of posture: has no consistent effect    Palpation     Additional Palpation Details  Tenderness to Rt 1st rib    Active Range of Motion   Cervical/Thoracic Spine     Cervical    Flexion: 45 degrees   Extension: 45 degrees      Left lateral flexion:  WFL  Right lateral flexion:  Restriction level minimal to moderate  Left rotation:  Restriction level: minimal  Right rotation:  Restriction level: minimal        Precautions: none      Manuals 24   Retraction with OP MO  10x    MO  10x MO  10x DL 10x MO  10x MO  15x DL 10x   Ext with Retraction MO 10x HEP   MO  10x MO  10x DL 10x MO  10x MO  15x DL 10x   Cerivcal Traction MO FH   MO MO DL MO MO DL   Central cervical gliders (side glide) FH  B FH Lt upper Rt lower     DL B DL- B MD- B DL B   Rt 1st rib mob  FH           Neuro Re-Ed             Scap Retraction     HEP HEP       Rows [place]  17R 20x [place] 17R 20x   2x10  Black  2x10 Black 2x10 Black 20x  Black  20x  Black [place] 17R 20x   Sh Ext [place] 8R 20x [place] R8 20x   2x10 Blue  2x10 Blue  2x10 Blue 20x  Blue  20x Blue   [place] 8R 20x  "  I,T,Y, W's prone     15x ea 15x  ea 15x  ea 15x ea I 15x Y,T, W  20x    Chin tuck 10x5\" 10x5''   10x5\" 10x5\" 10x5\" 10x5\" 10x5\" 10x5\"                             Ther Ex             Thoracic Ext 10x10\" 10x10''   10x5\" 10x5\" 10x5\" 10x5\" 10x5\" 10x10\"   Pec stretch 3x30\"    3x30\" 3x30\" 3x30\" 3x30\" 3x30\" 3x30\"   Open Book stretch 10x5\"  ea 10x5''ea   10x5\"   ea 10x5\"  ea 10x5\"  ea 10x5\" ea 10x5\"  ea 10x5\"   Thoracic foam protocol Huvidhi, michael, scap rolls 15x ea 15xea        Huvidhi, angels, scap rolls 15x ea   Sh b/l ER  20x blue black                       UBE retro 6' 6'   6' 6' 6' 6' 6' 6'                Ther Activity                                       Gait Training                                       Modalities                                              "

## 2024-01-26 ENCOUNTER — OFFICE VISIT (OUTPATIENT)
Dept: PHYSICAL THERAPY | Facility: CLINIC | Age: 69
End: 2024-01-26
Payer: COMMERCIAL

## 2024-01-26 DIAGNOSIS — M54.12 CERVICAL RADICULOPATHY: Primary | ICD-10-CM

## 2024-01-26 PROCEDURE — 97140 MANUAL THERAPY 1/> REGIONS: CPT

## 2024-01-26 PROCEDURE — 97110 THERAPEUTIC EXERCISES: CPT

## 2024-01-26 NOTE — PROGRESS NOTES
"Daily Note     Today's date: 2024  Patient name: Ayad Peacock Jr.  : 1955  MRN: [id number]  Referring provider: Debi Rosenberg P*  Dx:   Encounter Diagnosis     ICD-10-CM    1. Cervical radiculopathy  M54.12                      Subjective: Pt reports he's feeling a little better since LV, performing 1st rib mob w/strap at home. Notes he is performing this B/L.    Objective: See treatment diary below    Assessment: Reviewed/performed 1st rib mob w/strap B/L. Performed exercise progression and remaing ex program w/o issue. Cont to respond well to manual therapies. Tolerated treatment well. Will monitor.    Plan: Continue per plan of care.   l  Precautions: none      Manuals 24   Retraction with OP MO  10x    MO  10x MO  10x DL 10x MO  10x MO  15x DL 10x   Ext with Retraction MO 10x HEP   MO  10x MO  10x DL 10x MO  10x MO  15x DL 10x   Cerivcal Traction MO FH MO  MO MO DL MO MO DL   Central cervical gliders (side glide) FH  B FH Lt upper Rt lower MD    DL B DL- B MD- B DL B   Rt 1st rib mob  FH MD          Neuro Re-Ed             Scap Retraction     HEP HEP       Rows [place]  17R 20x [place] 17R 20x [place] 17R 20x  2x10  Black  2x10 Black 2x10 Black 20x  Black  20x  Black [place] 17R 20x   Sh Ext [place] 8R 20x [place] R8 20x [place] R8  20x  2x10 Blue  2x10 Blue  2x10 Blue 20x  Blue  20x Blue   [place] 8R 20x   I,T,Y, W's prone     15x ea 15x  ea 15x  ea 15x ea I 15x Y,T, W  20x    Chin tuck 10x5\" 10x5'' 10x5\"  10x5\" 10x5\" 10x5\" 10x5\" 10x5\" 10x5\"   1st rib mob w/strap   [id number]\" ea                       Ther Ex             Thoracic Ext 10x10\" 10x10'' 10x10\"  10x5\" 10x5\" 10x5\" 10x5\" 10x5\" 10x10\"   Pec stretch 3x30\"    3x30\" 3x30\" 3x30\" 3x30\" 3x30\" 3x30\"   Open Book stretch 10x5\"  ea 10x5''ea 10x5\" ea  10x5\"   ea 10x5\"  ea 10x5\"  ea 10x5\" ea 10x5\"  ea 10x5\"   Thoracic foam protocol michael Klein scap rolls 15x ea 15xea 15x ea       michael Klein scap " rolls 15x ea   Sh b/l ER  20x blue Black  15x                       UBE retro 6' 6' 6'  6' 6' 6' 6' 6' 6'                Ther Activity                                       Gait Training                                       Modalities

## 2024-01-30 ENCOUNTER — OFFICE VISIT (OUTPATIENT)
Dept: PHYSICAL THERAPY | Facility: CLINIC | Age: 69
End: 2024-01-30
Payer: COMMERCIAL

## 2024-01-30 DIAGNOSIS — M54.12 CERVICAL RADICULOPATHY: Primary | ICD-10-CM

## 2024-01-30 PROCEDURE — 97140 MANUAL THERAPY 1/> REGIONS: CPT

## 2024-01-30 PROCEDURE — 97110 THERAPEUTIC EXERCISES: CPT

## 2024-01-30 NOTE — PROGRESS NOTES
"Daily Note     Today's date: 2024  Patient name: Ayad Peacock Jr.  : 1955  MRN: [id number]  Referring provider: Debi Rosenberg P*  Dx:   Encounter Diagnosis     ICD-10-CM    1. Cervical radiculopathy  M54.12                      Subjective: Patient reports he continues to have radiating symptoms into bilateral hands.      Objective: See treatment diary below    Assessment: Patient instructed in use of towel roll in pillow for cervical support while sleeping.  Will try at home.   Tolerated treatment well  Patient noted improved mobility with lat stretch.     Plan: Continue per plan of care.   l  Precautions: none      Manuals 24   Retraction with OP MO  10x    MO  10x MO  10x DL 10x MO  10x MO  15x DL 10x   Ext with Retraction MO 10x HEP   MO  10x MO  10x DL 10x MO  10x MO  15x DL 10x   Cerivcal Traction MO FH MO ksg MO MO DL MO MO DL   Central cervical gliders (side glide) FH  B FH Lt upper Rt lower MD ksg   DL B DL- B MD- B DL B   Rt 1st rib mob  FH MD ksg         Neuro Re-Ed             Scap Retraction     HEP HEP       Rows [place]  17R 20x [place] 17R 20x [place] 17R 20x [place] 17R 20x 2x10  Black  2x10 Black 2x10 Black 20x  Black  20x  Black [place] 17R 20x   Sh Ext May 8R 20x [place] R8 20x May R8  20x May  R8  20x 2x10 Blue  2x10 Blue  2x10 Blue 20x  Blue  20x Blue   [place] 8R 20x   I,T,Y, W's prone     15x ea 15x  ea 15x  ea 15x ea I 15x Y,T, W  20x    Chin tuck 10x5\" 10x5'' 10x5\" 10x5\" 10x5\" 10x5\" 10x5\" 10x5\" 10x5\" 10x5\"   1st rib mob w/strap   [id number]\" ea                       Ther Ex             Thoracic Ext 10x10\" 10x10'' 10x10\" 15x10\" 10x5\" 10x5\" 10x5\" 10x5\" 10x5\" 10x10\"   Pec stretch 3x30\"    3x30\" 3x30\" 3x30\" 3x30\" 3x30\" 3x30\"   Open Book stretch 10x5\"  ea 10x5''ea 10x5\" ea 10x5\" 10x5\"   ea 10x5\"  ea 10x5\"  ea 10x5\" ea 10x5\"  ea 10x5\"   Thoracic foam protocol Latoya, michael, scap rolls 15x ea 15xea 15x ea 15x ea      Latoya, " "angels, scap rolls 15x ea   Sh b/l ER  20x blue Black  15x Black 15x         Kneeling lat stretch/elbows on Table    10\" x10         UBE retro 6' 6' 6' 6' 6' 6' 6' 6' 6' 6'                Ther Activity                                       Gait Training                                       Modalities                                              "

## 2024-02-01 ENCOUNTER — OFFICE VISIT (OUTPATIENT)
Dept: PHYSICAL THERAPY | Facility: CLINIC | Age: 69
End: 2024-02-01
Payer: COMMERCIAL

## 2024-02-01 DIAGNOSIS — M54.12 CERVICAL RADICULOPATHY: Primary | ICD-10-CM

## 2024-02-01 PROCEDURE — 97140 MANUAL THERAPY 1/> REGIONS: CPT | Performed by: PHYSICAL THERAPIST

## 2024-02-01 PROCEDURE — 97110 THERAPEUTIC EXERCISES: CPT | Performed by: PHYSICAL THERAPIST

## 2024-02-01 NOTE — PROGRESS NOTES
"Daily Note     Today's date: 2024  Patient name: Ayad Peacock Jr.  : 1955  MRN: [id number]  Referring provider: Debi Rosenberg P*  Dx:   Encounter Diagnosis     ICD-10-CM    1. Cervical radiculopathy  M54.12                      Subjective: Pt reports continued improvements.       Objective: See treatment diary below      Assessment: Elevation in R 1st and 2nd rib noted. Pt reported relief of stiffness post manual therapy.       Plan: Continue per plan of care.            Precautions: none    Manuals         Retraction with OP MO  10x            Ext with Retraction MO 10x HEP           Cerivcal Traction MO FH MO ksg MD        Central cervical gliders (side glide) FH  B FH Lt upper Rt lower MD rangel R UPA to C6-7        Rt 1st rib mob  FH MD rangel And 2nd MD        PA glides to thoracic     MD        Neuro Re-Ed             Scap Retraction             Rows [place]  17R 20x [place] 17R 20x [place] 17R 20x [place] 17R 20x [place] 18R 20x        Sh Ext May 8R 20x May R8 20x May R8  20x [place]  R8  20x [place] R10 20x        I,T,Y, W's prone             Chin tuck 10x5\" 10x5'' 10x5\" 10x5\"         1st rib mob w/strap   [id number]\" ea                       Ther Ex             Thoracic Ext 10x10\" 10x10'' 10x10\" 15x10\" 3x30\"        Pec stretch 3x30\"            Open Book stretch 10x5\"  ea 10x5''ea 10x5\" ea 10x5\"         Thoracic foam protocol Hugs, angels, scap rolls 15x ea 15xea 15x ea 15x ea 15x ea        Sh b/l ER  20x blue Black  15x Black 15x Black 20x        Kneeling lat stretch/elbows on Table    10\" x10 10\"x10        UBE retro 6' 6' 6' 6' 6'                     Ther Activity                                       Gait Training                                       Modalities                                               "

## 2024-02-05 ENCOUNTER — OFFICE VISIT (OUTPATIENT)
Dept: PHYSICAL THERAPY | Facility: CLINIC | Age: 69
End: 2024-02-05
Payer: COMMERCIAL

## 2024-02-05 DIAGNOSIS — M54.12 CERVICAL RADICULOPATHY: Primary | ICD-10-CM

## 2024-02-05 PROCEDURE — 97140 MANUAL THERAPY 1/> REGIONS: CPT | Performed by: PHYSICAL THERAPIST

## 2024-02-05 PROCEDURE — 97110 THERAPEUTIC EXERCISES: CPT | Performed by: PHYSICAL THERAPIST

## 2024-02-05 NOTE — PROGRESS NOTES
"Daily Note     Today's date: 2024  Patient name: Ayad Peacock Jr.  : 1955  MRN: [id number]  Referring provider: Debi Rosenberg P*  Dx:   Encounter Diagnosis     ICD-10-CM    1. Cervical radiculopathy  M54.12                      Subjective: Pt reports he was a little sore after last visit but he reports good results.      Objective: See treatment diary below      Assessment: Pt tolerating strengthening exercises well, progressing to increased reps. Pt still benefiting from manual. Will continue to progress as able.      Plan: Continue per plan of care.      Precautions: none    Manuals        Retraction with OP MO  10x            Ext with Retraction MO 10x HEP           Cerivcal Traction MO FH MO ksg MD CAMPBELL       Central cervical gliders (side glide) FH  B FH Lt upper Rt lower MD rangel R UPA to C6-7 R UPA to C6-7       Rt 1st rib mob  FH MD rangel And 2nd MD ADRIAN 1+2nd       PA glides to thoracic     MD CAMPBELL       Neuro Re-Ed             Scap Retraction             Rows [place]  17R 20x [place] 17R 20x [place] 17R 20x [place] 17R 20x [place] 18R 20x May 18R 2x15       Sh Ext [place] 8R 20x [place] R8 20x [place] R8  20x [place]  R8  20x [place] R10 20x [place] R10 2x15       I,T,Y, W's prone             Chin tuck 10x5\" 10x5'' 10x5\" 10x5\"         1st rib mob w/strap   [id number]\" ea                       Ther Ex             Thoracic Ext 10x10\" 10x10'' 10x10\" 15x10\" 3x30\" 3x30''       Pec stretch 3x30\"            Open Book stretch 10x5\"  ea 10x5''ea 10x5\" ea 10x5\"         Thoracic foam protocol Hugs, angels, scap rolls 15x ea 15xea 15x ea 15x ea 15x ea 15x ea       Sh b/l ER  20x blue Black  15x Black 15x Black 20x Black 20x       Kneeling lat stretch/elbows on Table    10\" x10 10\"x10 10x10''       UBE retro 6' 6' 6' 6' 6' 6'                    Ther Activity                                       Gait Training                                       Modalities                               "

## 2024-02-08 ENCOUNTER — OFFICE VISIT (OUTPATIENT)
Dept: PHYSICAL THERAPY | Facility: CLINIC | Age: 69
End: 2024-02-08
Payer: COMMERCIAL

## 2024-02-08 DIAGNOSIS — M54.12 CERVICAL RADICULOPATHY: Primary | ICD-10-CM

## 2024-02-08 PROCEDURE — 97110 THERAPEUTIC EXERCISES: CPT

## 2024-02-08 PROCEDURE — 97140 MANUAL THERAPY 1/> REGIONS: CPT

## 2024-02-08 NOTE — PROGRESS NOTES
"Daily Note     Today's date: 2024  Patient name: Ayad Peacock Jr.  : 1955  MRN: [id number]  Referring provider: Debi Rosenberg P*  Dx:   Encounter Diagnosis     ICD-10-CM    1. Cervical radiculopathy  M54.12                      Subjective: Pt reports yesterday was not a good day, his hands felt \"stiff and congested.\" Notes he played his guitar for 2 hours w/o dropping the pic. Pt reports since mobilizing 1st rib he has more \"color\" in his hands.    Objective: See treatment diary below    Assessment: Performed exercise program w/o difficulty or discomfort.  Relief after manual therapies, as below. Tolerated treatment well. Will monitor.    Plan: Continue per plan of care.      Precautions: none    Manuals       Retraction with OP MO  10x            Ext with Retraction MO 10x HEP           Cerivcal Traction MO FH MO ksg MD  MO      Central cervical gliders (side glide) FH  B FH Lt upper Rt lower MD rangel R UPA to C6-7 R UPA to C6-7 R UPA to C6-7      Rt 1st rib mob  FH MD rangel And 2nd MD FH 1+2nd KL  1+2nd  KL      PA glides to thoracic     MD  KL      Neuro Re-Ed             Scap Retraction             Rows May  17R 20x [place] 17R 20x [place] 17R 20x May 17R 20x May 18R 20x May 18R 2x15 [place]  18R 2x15      Sh Ext May 8R 20x [place] R8 20x May R8  20x [place]  R8  20x May R10 20x [place] R10 2x15 May  R 10 2x15      I,T,Y, W's prone             Chin tuck 10x5\" 10x5'' 10x5\" 10x5\"         1st rib mob w/strap   [id number]\" ea                       Ther Ex             Thoracic Ext 10x10\" 10x10'' 10x10\" 15x10\" 3x30\" 3x30'' 3x30\"      Pec stretch 3x30\"            Open Book stretch 10x5\"  ea 10x5''ea 10x5\" ea 10x5\"         Thoracic foam protocol Hugs, angels, scap rolls 15x ea 15xea 15x ea 15x ea 15x ea 15x ea 15x ea      Sh b/l ER  20x blue Black  15x Black 15x Black 20x Black 20x Black 20x      Kneeling lat stretch/elbows on Table    10\" x10 " "10\"x10 10x10'' 10x10\"      UBE retro 6' 6' 6' 6' 6' 6' 6'                   Ther Activity                                       Gait Training                                       Modalities                                                   "

## 2024-02-12 ENCOUNTER — OFFICE VISIT (OUTPATIENT)
Dept: PHYSICAL THERAPY | Facility: CLINIC | Age: 69
End: 2024-02-12
Payer: COMMERCIAL

## 2024-02-12 DIAGNOSIS — M54.12 CERVICAL RADICULOPATHY: Primary | ICD-10-CM

## 2024-02-12 PROCEDURE — 97110 THERAPEUTIC EXERCISES: CPT | Performed by: PHYSICAL THERAPIST

## 2024-02-12 PROCEDURE — 97112 NEUROMUSCULAR REEDUCATION: CPT | Performed by: PHYSICAL THERAPIST

## 2024-02-12 PROCEDURE — 97140 MANUAL THERAPY 1/> REGIONS: CPT | Performed by: PHYSICAL THERAPIST

## 2024-02-12 NOTE — PROGRESS NOTES
"Daily Note     Today's date: 2024  Patient name: Ayad Peacock Jr.  : 1955  MRN: [id number]  Referring provider: Debi Rosenberg P*  Dx:   Encounter Diagnosis     ICD-10-CM    1. Cervical radiculopathy  M54.12                      Subjective: Pt reports he had a incident on Thursday where he had to drag a deer through his property and that was very hard and increased some symptoms for 2 days following and then symptoms started to improve again.      Objective: See treatment diary below      Assessment: Pt tolerating exercises well, progressed some resistance which was challenging \"more challenging than dragging the deer\". Pt tolerated manual well, Lt upper cervical most stiff improve with manual.       Plan: Continue per plan of care.      Precautions: none    Manuals      Retraction with OP MO  10x            Ext with Retraction MO 10x HEP           Cerivcal Traction MO FH MO ksg MD FH MO FH     Central cervical gliders (side glide) FH  B FH Lt upper Rt lower MD ksg R UPA to C6-7 R UPA to C6-7 R UPA to C6-7 L UPA to C2     Rt 1st rib mob  FH MD ksg And 2nd MD FH 1+2nd KL  1+2nd  KL FH 1+2     PA glides to thoracic     MD FH KL FH     Neuro Re-Ed             Scap Retraction             Rows [place]  17R 20x May 17R 20x May 17R 20x May 17R 20x [place] 18R 20x [place] 18R 2x15 [place]  18R 2x15 [place]  20R 2x15     Sh Ext [place] 8R 20x May R8 20x May R8  20x [place]  R8  20x [place] R10 20x [place] R10 2x15 May  R 10 2x15 May  R 12 2x15     I,T,Y, W's prone             Chin tuck 10x5\" 10x5'' 10x5\" 10x5\"         1st rib mob w/strap   [id number]\" ea                       Ther Ex             Thoracic Ext 10x10\" 10x10'' 10x10\" 15x10\" 3x30\" 3x30'' 3x30\" 3x30''     Pec stretch 3x30\"            Open Book stretch 10x5\"  ea 10x5''ea 10x5\" ea 10x5\"         Thoracic foam protocol Hugs, angels, scap rolls 15x ea 15xea 15x ea 15x ea 15x ea 15x ea 15x ea 15xea  " "   Sh b/l ER  20x blue Black  15x Black 15x Black 20x Black 20x Black 20x Black 20x     Kneeling lat stretch/elbows on Table    10\" x10 10\"x10 10x10'' 10x10\" 10x10''     UBE retro 6' 6' 6' 6' 6' 6' 6' 6'                  Ther Activity                                       Gait Training                                       Modalities                                                     "

## 2024-02-14 ENCOUNTER — OFFICE VISIT (OUTPATIENT)
Dept: PHYSICAL THERAPY | Facility: CLINIC | Age: 69
End: 2024-02-14
Payer: COMMERCIAL

## 2024-02-14 DIAGNOSIS — M54.12 CERVICAL RADICULOPATHY: Primary | ICD-10-CM

## 2024-02-14 PROCEDURE — 97140 MANUAL THERAPY 1/> REGIONS: CPT | Performed by: PHYSICAL THERAPIST

## 2024-02-14 PROCEDURE — 97110 THERAPEUTIC EXERCISES: CPT | Performed by: PHYSICAL THERAPIST

## 2024-02-14 PROCEDURE — 97112 NEUROMUSCULAR REEDUCATION: CPT | Performed by: PHYSICAL THERAPIST

## 2024-02-14 NOTE — PROGRESS NOTES
"Daily Note     Today's date: 2024  Patient name: Ayad Peacock Jr.  : 1955  MRN: [id number]  Referring provider: Debi Rosenberg P*  Dx:   Encounter Diagnosis     ICD-10-CM    1. Cervical radiculopathy  M54.12                      Subjective: Pt reports his fingers are very congested today but he had to snow blow his driveway and the  is very heavy. Other than that he is doing very well.      Objective: See treatment diary below      Assessment: Pt tolerating exercises well and as well as manual. Education regarding if a new activity with increased intensity can flare him up due to increased use of arms and hands like snow blowing. Some congestion may be due to circulation, educated to discuss with doctor at next follow up.      Plan: Continue per plan of care.      Precautions: none    Manuals     Retraction with OP MO  10x            Ext with Retraction MO 10x HEP           Cerivcal Traction MO FH MO ksg MD FH MO FH FH    Central cervical gliders (side glide) FH  B FH Lt upper Rt lower MD claire R UPA to C6-7 R UPA to C6-7 R UPA to C6-7 L UPA to C2 L UPA to C2    Rt 1st rib mob  FH MD rangel And 2nd MD FH 1+2nd KL  1+2nd  KL FH 1+2 FH 1+2    PA glides to thoracic     MD FH KL FH     Neuro Re-Ed             Scap Retraction             Rows [place]  17R 20x May 17R 20x May 17R 20x [place] 17R 20x [place] 18R 20x [place] 18R 2x15 May  18R 2x15 May  20R 2x15 [place]  20R 2x15    Sh Ext [place] 8R 20x May R8 20x [place] R8  20x May  R8  20x [place] R10 20x [place] R10 2x15 May  R 10 2x15 May  R 12 2x15 [place]  10R 2x15    I,T,Y, W's prone             Chin tuck 10x5\" 10x5'' 10x5\" 10x5\"         1st rib mob w/strap   [id number]\" ea                       Ther Ex             Thoracic Ext 10x10\" 10x10'' 10x10\" 15x10\" 3x30\" 3x30'' 3x30\" 3x30'' 3x30''    Pec stretch 3x30\"            Open Book stretch 10x5\"  ea 10x5''ea 10x5\" ea 10x5\"       " "  Thoracic foam protocol Hugs, angels, scap rolls 15x ea 15xea 15x ea 15x ea 15x ea 15x ea 15x ea 15xea 15xea    Sh b/l ER  20x blue Black  15x Black 15x Black 20x Black 20x Black 20x Black 20x Black 20x    Kneeling lat stretch/elbows on Table    10\" x10 10\"x10 10x10'' 10x10\" 10x10''     UBE retro 6' 6' 6' 6' 6' 6' 6' 6' 6'                 Ther Activity                                       Gait Training                                       Modalities                                                       "

## 2024-02-15 ENCOUNTER — APPOINTMENT (OUTPATIENT)
Dept: LAB | Facility: HOSPITAL | Age: 69
End: 2024-02-15
Payer: COMMERCIAL

## 2024-02-15 DIAGNOSIS — R53.83 FATIGUE, UNSPECIFIED TYPE: ICD-10-CM

## 2024-02-15 DIAGNOSIS — Z12.5 SCREENING PSA (PROSTATE SPECIFIC ANTIGEN): ICD-10-CM

## 2024-02-15 DIAGNOSIS — E78.49 OTHER HYPERLIPIDEMIA: ICD-10-CM

## 2024-02-15 DIAGNOSIS — R73.9 ELEVATED BLOOD SUGAR: ICD-10-CM

## 2024-02-15 DIAGNOSIS — I10 ESSENTIAL HYPERTENSION: ICD-10-CM

## 2024-02-15 LAB
ALBUMIN SERPL BCP-MCNC: 4.9 G/DL (ref 3.5–5)
ALP SERPL-CCNC: 77 U/L (ref 34–104)
ALT SERPL W P-5'-P-CCNC: 30 U/L (ref 7–52)
ANION GAP SERPL CALCULATED.3IONS-SCNC: 7 MMOL/L
AST SERPL W P-5'-P-CCNC: 22 U/L (ref 13–39)
BASOPHILS # BLD AUTO: 0.03 THOUSANDS/ÂΜL (ref 0–0.1)
BASOPHILS NFR BLD AUTO: 1 % (ref 0–1)
BILIRUB SERPL-MCNC: 1.14 MG/DL (ref 0.2–1)
BILIRUB UR QL STRIP: NEGATIVE
BUN SERPL-MCNC: 15 MG/DL (ref 5–25)
CALCIUM SERPL-MCNC: 9.5 MG/DL (ref 8.4–10.2)
CHLORIDE SERPL-SCNC: 98 MMOL/L (ref 96–108)
CHOLEST SERPL-MCNC: 184 MG/DL
CLARITY UR: CLEAR
CO2 SERPL-SCNC: 30 MMOL/L (ref 21–32)
COLOR UR: NORMAL
CREAT SERPL-MCNC: 0.88 MG/DL (ref 0.6–1.3)
EOSINOPHIL # BLD AUTO: 0.12 THOUSAND/ÂΜL (ref 0–0.61)
EOSINOPHIL NFR BLD AUTO: 3 % (ref 0–6)
ERYTHROCYTE [DISTWIDTH] IN BLOOD BY AUTOMATED COUNT: 12 % (ref 11.6–15.1)
EST. AVERAGE GLUCOSE BLD GHB EST-MCNC: 123 MG/DL
GFR SERPL CREATININE-BSD FRML MDRD: 87 ML/MIN/1.73SQ M
GLUCOSE P FAST SERPL-MCNC: 118 MG/DL (ref 65–99)
GLUCOSE UR STRIP-MCNC: NEGATIVE MG/DL
HBA1C MFR BLD: 5.9 %
HCT VFR BLD AUTO: 45.9 % (ref 36.5–49.3)
HDLC SERPL-MCNC: 53 MG/DL
HGB BLD-MCNC: 15.3 G/DL (ref 12–17)
HGB UR QL STRIP.AUTO: NEGATIVE
IMM GRANULOCYTES # BLD AUTO: 0 THOUSAND/UL (ref 0–0.2)
IMM GRANULOCYTES NFR BLD AUTO: 0 % (ref 0–2)
KETONES UR STRIP-MCNC: NEGATIVE MG/DL
LDLC SERPL CALC-MCNC: 103 MG/DL (ref 0–100)
LEUKOCYTE ESTERASE UR QL STRIP: NEGATIVE
LYMPHOCYTES # BLD AUTO: 1.34 THOUSANDS/ÂΜL (ref 0.6–4.47)
LYMPHOCYTES NFR BLD AUTO: 30 % (ref 14–44)
MCH RBC QN AUTO: 31.3 PG (ref 26.8–34.3)
MCHC RBC AUTO-ENTMCNC: 33.3 G/DL (ref 31.4–37.4)
MCV RBC AUTO: 94 FL (ref 82–98)
MONOCYTES # BLD AUTO: 0.5 THOUSAND/ÂΜL (ref 0.17–1.22)
MONOCYTES NFR BLD AUTO: 11 % (ref 4–12)
NEUTROPHILS # BLD AUTO: 2.45 THOUSANDS/ÂΜL (ref 1.85–7.62)
NEUTS SEG NFR BLD AUTO: 55 % (ref 43–75)
NITRITE UR QL STRIP: NEGATIVE
NRBC BLD AUTO-RTO: 0 /100 WBCS
PH UR STRIP.AUTO: 7 [PH]
PLATELET # BLD AUTO: 208 THOUSANDS/UL (ref 149–390)
PMV BLD AUTO: 11.2 FL (ref 8.9–12.7)
POTASSIUM SERPL-SCNC: 3.9 MMOL/L (ref 3.5–5.3)
PROT SERPL-MCNC: 7.8 G/DL (ref 6.4–8.4)
PROT UR STRIP-MCNC: NEGATIVE MG/DL
PSA SERPL-MCNC: 0.7 NG/ML (ref 0–4)
RBC # BLD AUTO: 4.89 MILLION/UL (ref 3.88–5.62)
SODIUM SERPL-SCNC: 135 MMOL/L (ref 135–147)
SP GR UR STRIP.AUTO: 1.01 (ref 1–1.03)
TRIGL SERPL-MCNC: 138 MG/DL
TSH SERPL DL<=0.05 MIU/L-ACNC: 1.26 UIU/ML (ref 0.45–4.5)
UROBILINOGEN UR STRIP-ACNC: <2 MG/DL
WBC # BLD AUTO: 4.44 THOUSAND/UL (ref 4.31–10.16)

## 2024-02-15 PROCEDURE — 36415 COLL VENOUS BLD VENIPUNCTURE: CPT

## 2024-02-15 PROCEDURE — 84443 ASSAY THYROID STIM HORMONE: CPT

## 2024-02-15 PROCEDURE — G0103 PSA SCREENING: HCPCS

## 2024-02-15 PROCEDURE — 83036 HEMOGLOBIN GLYCOSYLATED A1C: CPT

## 2024-02-15 PROCEDURE — 85025 COMPLETE CBC W/AUTO DIFF WBC: CPT

## 2024-02-15 PROCEDURE — 81003 URINALYSIS AUTO W/O SCOPE: CPT

## 2024-02-15 PROCEDURE — 80061 LIPID PANEL: CPT

## 2024-02-15 PROCEDURE — 80053 COMPREHEN METABOLIC PANEL: CPT

## 2024-02-18 ENCOUNTER — RA CDI HCC (OUTPATIENT)
Dept: OTHER | Facility: HOSPITAL | Age: 69
End: 2024-02-18

## 2024-02-19 ENCOUNTER — OFFICE VISIT (OUTPATIENT)
Dept: PHYSICAL THERAPY | Facility: CLINIC | Age: 69
End: 2024-02-19
Payer: COMMERCIAL

## 2024-02-19 DIAGNOSIS — M54.12 CERVICAL RADICULOPATHY: Primary | ICD-10-CM

## 2024-02-19 PROCEDURE — 97110 THERAPEUTIC EXERCISES: CPT | Performed by: PHYSICAL THERAPIST

## 2024-02-19 PROCEDURE — 97112 NEUROMUSCULAR REEDUCATION: CPT | Performed by: PHYSICAL THERAPIST

## 2024-02-19 PROCEDURE — 97140 MANUAL THERAPY 1/> REGIONS: CPT | Performed by: PHYSICAL THERAPIST

## 2024-02-19 NOTE — PROGRESS NOTES
"Daily Note     Today's date: 2024  Patient name: Ayad Peacock Jr.  : 1955  MRN: [id number]  Referring provider: Debi Rosenberg P*  Dx:   Encounter Diagnosis     ICD-10-CM    1. Cervical radiculopathy  M54.12                      Subjective: He feels as though he is plateauing with his symptoms. He continues to report stiffness, coldness, congestion in his hands and fingers. Symptoms improve with gloves and movement. Discussed possible arthritis and cold of winter with worsening symptoms.      Objective: See treatment diary below      Assessment: Pt is tolerating strengthening well as well as manual. Rt first rib and UT remains more hypertonic but hands symptoms are bilateral. Educated patient to discuss continued symptoms with PCP on Friday's appt and look for possible referral for ortho.      Plan: Continue per plan of care.      Precautions: none    Manuals    Retraction with OP MO  10x            Ext with Retraction MO 10x HEP           Cerivcal Traction MO FH MO ksg MD FH MO FH FH FH   Central cervical gliders (side glide) FH  B FH Lt upper Rt lower MD claire R UPA to C6-7 R UPA to C6-7 R UPA to C6-7 L UPA to C2 L UPA to C2 L UPA to C2   Rt 1st rib mob  FH MD rangel And 2nd MD FH 1+2nd KL  1+2nd  KL FH 1+2 FH 1+2 FH 1+2   PA glides to thoracic     MD FH KL FH     Neuro Re-Ed             Scap Retraction             Rows [place]  17R 20x [place] 17R 20x May 17R 20x [place] 17R 20x [place] 18R 20x [place] 18R 2x15 [place]  18R 2x15 May  20R 2x15 [place]  20R 2x15 May  21R 2x15   Sh Ext [place] 8R 20x [place] R8 20x [place] R8  20x May  R8  20x May R10 20x [place] R10 2x15 [place]  R 10 2x15 May  R 12 2x15 May  10R 2x15 [place]  10R 2x15   I,T,Y, W's prone             Chin tuck 10x5\" 10x5'' 10x5\" 10x5\"         1st rib mob w/strap   [id number]\" ea                       Ther Ex             Thoracic Ext 10x10\" 10x10'' 10x10\" 15x10\" 3x30\" 3x30'' " "3x30\" 3x30'' 3x30'' 3x30''   Pec stretch 3x30\"            Open Book stretch 10x5\"  ea 10x5''ea 10x5\" ea 10x5\"         Thoracic foam protocol [place]uv[place], michael, scap rolls 15x ea 15xea 15x ea 15x ea 15x ea 15x ea 15x ea 15xea 15xea 20xea   Sh b/l ER  20x blue Black  15x Black 15x Black 20x Black 20x Black 20x Black 20x Black 20x Back 20x   Kneeling lat stretch/elbows on Table    10\" x10 10\"x10 10x10'' 10x10\" 10x10''     UBE retro 6' 6' 6' 6' 6' 6' 6' 6' 6' 6'                Ther Activity                                       Gait Training                                       Modalities                                                         "

## 2024-02-21 ENCOUNTER — OFFICE VISIT (OUTPATIENT)
Dept: PHYSICAL THERAPY | Facility: CLINIC | Age: 69
End: 2024-02-21
Payer: COMMERCIAL

## 2024-02-21 DIAGNOSIS — M54.12 CERVICAL RADICULOPATHY: Primary | ICD-10-CM

## 2024-02-21 PROBLEM — Z00.00 HEALTHCARE MAINTENANCE: Status: RESOLVED | Noted: 2018-04-02 | Resolved: 2024-02-21

## 2024-02-21 PROBLEM — H10.13 ALLERGIC CONJUNCTIVITIS OF BOTH EYES: Status: RESOLVED | Noted: 2020-08-13 | Resolved: 2024-02-21

## 2024-02-21 PROCEDURE — 97110 THERAPEUTIC EXERCISES: CPT | Performed by: PHYSICAL THERAPIST

## 2024-02-21 PROCEDURE — 97140 MANUAL THERAPY 1/> REGIONS: CPT | Performed by: PHYSICAL THERAPIST

## 2024-02-21 PROCEDURE — 97112 NEUROMUSCULAR REEDUCATION: CPT | Performed by: PHYSICAL THERAPIST

## 2024-02-21 NOTE — PROGRESS NOTES
"Daily Note     Today's date: 2024  Patient name: Ayad Peacock Jr.  : 1955  MRN: [id number]  Referring provider: Debi Rosenberg P*  Dx:   Encounter Diagnosis     ICD-10-CM    1. Cervical radiculopathy  M54.12                      Subjective: Pt reports he felt worse for the rest of the day but then felt better.      Objective: See treatment diary below      Assessment: Pt tolerating manual well. Rt first rib and soft tissue remain tighter than left. Pt is tolerated strengthening well. Discussed seeing ortho for further treatment as treatment seems to be plateauing with symptoms at PT. Pt has met most goals overall but pt reports good and bad days.      Plan: Hold until appt. Likely refer to ortho for further treatment.     Precautions: none    Manuals    Retraction with OP             Ext with Retraction             Cerivcal Traction FH  MO ksg MD FH MO FH FH FH   Central cervical gliders (side glide) L UPA to C2  MD rangel R UPA to C6-7 R UPA to C6-7 R UPA to C6-7 L UPA to C2 L UPA to C2 L UPA to C2   Rt 1st rib mob FH 1+2  MD ksg And 2nd MD FH 1+2nd KL  1+2nd  KL FH 1+2 FH 1+2 FH 1+2   PA glides to thoracic     MD FH KL FH     Neuro Re-Ed             Scap Retraction             Rows [place]  21R 2x15  May 17R 20x May 17R 20x [place] 18R 20x [place] 18R 2x15 May  18R 2x15 [place]  20R 2x15 [place]  20R 2x15 May  21R 2x15   Sh Ext [place]  10R 2x15  May R8  20x [place]  R8  20x [place] R10 20x May R10 2x15 [place]  R 10 2x15 [place]  R 12 2x15 May  10R 2x15 [place]  10R 2x15   I,T,Y, W's prone             Chin tuck   10x5\" 10x5\"         1st rib mob w/strap   [id number]\" ea                       Ther Ex             Thoracic Ext   10x10\" 15x10\" 3x30\" 3x30'' 3x30\" 3x30'' 3x30'' 3x30''   Pec stretch             Open Book stretch   10x5\" ea 10x5\"         Thoracic foam protocol 20xea  15x ea 15x ea 15x ea 15x ea 15x ea 15xea 15xea 20xea    b/l ER " "Black 20x  Black  15x Black 15x Black 20x Black 20x Black 20x Black 20x Black 20x Black 20x   Kneeling lat stretch/elbows on Table    10\" x10 10\"x10 10x10'' 10x10\" 10x10''     UBE retro 6'  6' 6' 6' 6' 6' 6' 6' 6'                Ther Activity                                       Gait Training                                       Modalities                                                           "

## 2024-02-27 ENCOUNTER — OFFICE VISIT (OUTPATIENT)
Dept: FAMILY MEDICINE CLINIC | Facility: CLINIC | Age: 69
End: 2024-02-27
Payer: COMMERCIAL

## 2024-02-27 VITALS
RESPIRATION RATE: 16 BRPM | TEMPERATURE: 98.4 F | WEIGHT: 161 LBS | OXYGEN SATURATION: 98 % | SYSTOLIC BLOOD PRESSURE: 132 MMHG | HEIGHT: 67 IN | BODY MASS INDEX: 25.27 KG/M2 | DIASTOLIC BLOOD PRESSURE: 80 MMHG | HEART RATE: 74 BPM

## 2024-02-27 DIAGNOSIS — I10 ESSENTIAL HYPERTENSION: Primary | ICD-10-CM

## 2024-02-27 DIAGNOSIS — E78.49 OTHER HYPERLIPIDEMIA: ICD-10-CM

## 2024-02-27 DIAGNOSIS — R73.9 ELEVATED BLOOD SUGAR: ICD-10-CM

## 2024-02-27 PROCEDURE — 99214 OFFICE O/P EST MOD 30 MIN: CPT | Performed by: PHYSICIAN ASSISTANT

## 2024-02-27 NOTE — PROGRESS NOTES
Assessment/Plan:    1. HTN - well controlled on HCTZ 12.5 mg once daily     2. Hyperlipidemia - well controlled with diet and exercise, monitor q 6 months     3. IFG - A1C 5.9%, continue with diet, exercise, repeat 6 months     4. Wound right lower leg - healing nicely, TD today     5. Polyp of colon - colon due 2027     6. Numbness upper arms - was advised is arthritis, discussed foods and supplement that will help with this. Berries, cherries, tumeric, fish oil, glucosamine chondroitin, work on stretching and strengthening     F/u 6 months with labs or sooner if needed      Subjective:   Chief Complaint   Patient presents with    Hyperlipidemia    Hypertension      Patient ID: Ayad Peacock Jr. is a 68 y.o. male.    Patient here in follow up. Had labs done.     Was seeing PT for arm numbness.Was told arthritis in neck. Seeing PT and massage therapist for this, has since been discharged and would like to discuss.    Started biking and walking as the weather is improving.    Compliant with medications, healthy diet.         The following portions of the patient's history were reviewed and updated as appropriate: allergies, current medications, past family history, past medical history, past social history, past surgical history, and problem list.    Past Medical History:   Diagnosis Date    Adjustment disorder     last assessed - 25Mar2014    Allergic rhinitis 02/06/2012    Cubital tunnel syndrome 04/20/2010    last assessed - 13Apr2012    Dermatitis 10/17/2011    last assessed - [id number]    Electrolyte or fluid disorder 01/24/2011    Fatty liver 10/06/2008    Gout     Hypertension     Lichen planus 01/24/2011    Varicella     Wears glasses      Past Surgical History:   Procedure Laterality Date    COLONOSCOPY  01/2015    Dr Graham. Diverticulosis, hemorrhoids and hyperplastic polyp removed.  Prep was suboptimal    CYSTOSCOPY W/ URETEROSCOPY W/ LITHOTRIPSY      MOUTH SURGERY      duct blockage after injury-inside  cheek     VAS DUPLEX (HISTORICAL)  (DUPLEX EXT VEINS COMPLETE BL)       Family History   Problem Relation Age of Onset    Diabetes Mother     Uterine cancer Mother     Arthritis Father     Heart failure Father         Congesrive heart failure    Hypertension Father     Prostate cancer Father     COPD Sister     Substance Abuse Neg Hx     Mental illness Neg Hx     Alcohol abuse Neg Hx      Social History     Socioeconomic History    Marital status: Single     Spouse name: Not on file    Number of children: 0    Years of education: Not on file    Highest education level: Not on file   Occupational History    Occupation: [employer]      Comment: retired     Occupation: OR Tech      Employer: [email or web address]     Comment: retired    Tobacco Use    Smoking status: Never    Smokeless tobacco: Never   Vaping Use    Vaping status: Never Used   Substance and Sexual Activity    Alcohol use: Yes     Alcohol/week: 1.0 - 2.0 standard drink of alcohol     Types: 1 - 2 Standard drinks or equivalent per week     Comment: social alcohol use, drinks 1 glass of wine twice a week    Drug use: Never    Sexual activity: Not on file   Other Topics Concern    Not on file   Social History Narrative    Drinks coffee - drinks 2 cups a day    Has  Smoke detectors    Uses safety equipment - seatbelts            Who lives in your home: alone    What type of home do you live in: Single house    Age of your home: 58 yrs old     How long have you been living there: 20 yrs    Type of heat: Baseboard    Type of fuel: Oil and Gas    What type of annabelle is in your bedroom: Hardwood floor    Do you have the following in or near your home:    Air products: Central air and Dehumidifier    Pests: None    Pets: None    Are pets allowed in bedroom: N/A    Open fields, wooded areas nearby: Open fields and Wooded areas    Basement: Damp, Musty and Unfinished    Exposure to second hand smoke: No        Habits:    Caffeine: coffee 2 cups daily-hot tea  occasionally     Chocolate: occasionally     Other:     Social Determinants of Health     Financial Resource Strain: Low Risk  (8/21/2023)    Overall Financial Resource Strain (CARDIA)     Difficulty of Paying Living Expenses: Not hard at all   Food Insecurity: Not on file   Transportation Needs: No Transportation Needs (8/21/2023)    PRAPARE - Transportation     Lack of Transportation (Medical): No     Lack of Transportation (Non-Medical): No   Recent Concern: Transportation Needs - Unmet Transportation Needs (8/21/2023)    PRAPARE - Transportation     Lack of Transportation (Medical): No     Lack of Transportation (Non-Medical): Yes   Physical Activity: Sufficiently Active (9/29/2020)    Exercise Vital Sign     Days of Exercise per Week: 7 days     Minutes of Exercise per Session: 40 min   Stress: Not on file   Social Connections: Not on file   Intimate Partner Violence: Not on file   Housing Stability: Not on file       Current Outpatient Medications:     Calcium Carb-Cholecalciferol (CALCIUM/VITAMIN D PO), Take by mouth, Disp: , Rfl:     fexofenadine (ALLEGRA) 180 MG tablet, Take 1 tablet (180 mg total) by mouth daily IN AM, Disp: 30 tablet, Rfl: 11    hydrochlorothiazide (HYDRODIURIL) 12.5 mg tablet, TAKE 1 TABLET BY MOUTH EVERY DAY, Disp: 90 tablet, Rfl: 0    Misc Natural Products (SAW PALMETTO) CAPS, Take 3 capsules by mouth daily, Disp: , Rfl:     Multiple Vitamins-Minerals (OCUVITE EXTRA) TABS, Take 1 tablet by mouth daily  , Disp: , Rfl:     TART CHERRY PO, Take 1,000 mg by mouth in the morning, Disp: , Rfl:     Multiple Vitamins-Minerals (ONE-A-DAY MENS 50+ PO), Take by mouth daily (Patient not taking: Reported on 2/27/2024), Disp: , Rfl:     Review of Systems   Constitutional:  Negative for chills and fever.   HENT:  Negative for ear pain and sore throat.    Eyes:  Negative for pain and visual disturbance.   Respiratory:  Negative for cough and shortness of breath.    Cardiovascular:  Negative for chest  "pain and palpitations.   Gastrointestinal:  Negative for abdominal pain and vomiting.   Genitourinary:  Negative for dysuria and hematuria.   Musculoskeletal:  Negative for arthralgias and back pain.   Skin:  Negative for color change and rash.   Neurological:  Negative for seizures and syncope.   All other systems reviewed and are negative.            Objective:    Vitals:    02/27/24 1013   BP: (!) 174/90   Pulse: 74   Resp: 16   Temp: 98.4 °F (36.9 °C)   TempSrc: Temporal   SpO2: 98%   Weight: 73 kg (161 lb)   Height: 5' 6.75\" (1.695 m)     BP Readings from Last 3 Encounters:   02/27/24 132/80   08/21/23 130/72   02/03/23 146/90        Physical Exam  Constitutional:       Appearance: Normal appearance. He is well-developed. He is obese.   HENT:      Head: Normocephalic and atraumatic.   Cardiovascular:      Rate and Rhythm: Normal rate and regular rhythm.      Pulses: Normal pulses.      Heart sounds: Normal heart sounds.   Pulmonary:      Effort: Pulmonary effort is normal.      Breath sounds: Normal breath sounds.   Musculoskeletal:         General: Normal range of motion.      Cervical back: Normal range of motion and neck supple.   Skin:     General: Skin is warm.   Neurological:      General: No focal deficit present.      Mental Status: He is alert and oriented to person, place, and time.   Psychiatric:         Mood and Affect: Mood normal.         Behavior: Behavior normal.         Thought Content: Thought content normal.         Judgment: Judgment normal.             "

## 2024-02-29 DIAGNOSIS — I10 ESSENTIAL HYPERTENSION: ICD-10-CM

## 2024-02-29 RX ORDER — HYDROCHLOROTHIAZIDE 12.5 MG/1
TABLET ORAL
Qty: 90 TABLET | Refills: 0 | Status: SHIPPED | OUTPATIENT
Start: 2024-02-29

## 2024-05-27 DIAGNOSIS — I10 ESSENTIAL HYPERTENSION: ICD-10-CM

## 2024-05-28 RX ORDER — HYDROCHLOROTHIAZIDE 12.5 MG/1
TABLET ORAL
Qty: 90 TABLET | Refills: 1 | Status: SHIPPED | OUTPATIENT
Start: 2024-05-28

## 2024-08-19 ENCOUNTER — APPOINTMENT (OUTPATIENT)
Dept: LAB | Facility: HOSPITAL | Age: 69
End: 2024-08-19
Payer: COMMERCIAL

## 2024-08-19 DIAGNOSIS — I10 ESSENTIAL HYPERTENSION: ICD-10-CM

## 2024-08-19 DIAGNOSIS — E78.49 OTHER HYPERLIPIDEMIA: ICD-10-CM

## 2024-08-19 DIAGNOSIS — R73.9 ELEVATED BLOOD SUGAR: ICD-10-CM

## 2024-08-19 LAB
ALBUMIN SERPL BCG-MCNC: 4.8 G/DL (ref 3.5–5)
ALP SERPL-CCNC: 80 U/L (ref 34–104)
ALT SERPL W P-5'-P-CCNC: 30 U/L (ref 7–52)
ANION GAP SERPL CALCULATED.3IONS-SCNC: 13 MMOL/L (ref 4–13)
AST SERPL W P-5'-P-CCNC: 27 U/L (ref 13–39)
BILIRUB SERPL-MCNC: 1.66 MG/DL (ref 0.2–1)
BUN SERPL-MCNC: 12 MG/DL (ref 5–25)
CALCIUM SERPL-MCNC: 9.4 MG/DL (ref 8.4–10.2)
CHLORIDE SERPL-SCNC: 98 MMOL/L (ref 96–108)
CHOLEST SERPL-MCNC: 175 MG/DL
CO2 SERPL-SCNC: 25 MMOL/L (ref 21–32)
CREAT SERPL-MCNC: 0.89 MG/DL (ref 0.6–1.3)
EST. AVERAGE GLUCOSE BLD GHB EST-MCNC: 123 MG/DL
GFR SERPL CREATININE-BSD FRML MDRD: 86 ML/MIN/1.73SQ M
GLUCOSE P FAST SERPL-MCNC: 117 MG/DL (ref 65–99)
HBA1C MFR BLD: 5.9 %
HDLC SERPL-MCNC: 51 MG/DL
LDLC SERPL CALC-MCNC: 100 MG/DL (ref 0–100)
POTASSIUM SERPL-SCNC: 4.4 MMOL/L (ref 3.5–5.3)
PROT SERPL-MCNC: 7.5 G/DL (ref 6.4–8.4)
SODIUM SERPL-SCNC: 136 MMOL/L (ref 135–147)
TRIGL SERPL-MCNC: 122 MG/DL

## 2024-08-19 PROCEDURE — 80061 LIPID PANEL: CPT

## 2024-08-19 PROCEDURE — 36415 COLL VENOUS BLD VENIPUNCTURE: CPT

## 2024-08-19 PROCEDURE — 83036 HEMOGLOBIN GLYCOSYLATED A1C: CPT

## 2024-08-19 PROCEDURE — 80053 COMPREHEN METABOLIC PANEL: CPT

## 2024-08-26 DIAGNOSIS — I10 ESSENTIAL HYPERTENSION: ICD-10-CM

## 2024-08-27 RX ORDER — HYDROCHLOROTHIAZIDE 12.5 MG/1
TABLET ORAL
Qty: 90 TABLET | Refills: 1 | Status: SHIPPED | OUTPATIENT
Start: 2024-08-27

## 2024-08-28 ENCOUNTER — OFFICE VISIT (OUTPATIENT)
Dept: FAMILY MEDICINE CLINIC | Facility: CLINIC | Age: 69
End: 2024-08-28
Payer: COMMERCIAL

## 2024-08-28 VITALS
BODY MASS INDEX: 24.96 KG/M2 | HEIGHT: 67 IN | OXYGEN SATURATION: 98 % | SYSTOLIC BLOOD PRESSURE: 180 MMHG | DIASTOLIC BLOOD PRESSURE: 90 MMHG | RESPIRATION RATE: 16 BRPM | HEART RATE: 76 BPM | WEIGHT: 159 LBS | TEMPERATURE: 98.4 F

## 2024-08-28 DIAGNOSIS — Z12.5 SCREENING PSA (PROSTATE SPECIFIC ANTIGEN): ICD-10-CM

## 2024-08-28 DIAGNOSIS — Z00.00 MEDICARE ANNUAL WELLNESS VISIT, SUBSEQUENT: Primary | ICD-10-CM

## 2024-08-28 DIAGNOSIS — R73.9 ELEVATED BLOOD SUGAR: ICD-10-CM

## 2024-08-28 DIAGNOSIS — R53.83 FATIGUE, UNSPECIFIED TYPE: ICD-10-CM

## 2024-08-28 DIAGNOSIS — I10 ESSENTIAL HYPERTENSION: ICD-10-CM

## 2024-08-28 DIAGNOSIS — E78.49 OTHER HYPERLIPIDEMIA: ICD-10-CM

## 2024-08-28 DIAGNOSIS — L98.9 NON-HEALING SKIN LESION OF NOSE: ICD-10-CM

## 2024-08-28 DIAGNOSIS — I10 PRIMARY HYPERTENSION: ICD-10-CM

## 2024-08-28 PROCEDURE — G0439 PPPS, SUBSEQ VISIT: HCPCS | Performed by: PHYSICIAN ASSISTANT

## 2024-08-28 PROCEDURE — 99214 OFFICE O/P EST MOD 30 MIN: CPT | Performed by: PHYSICIAN ASSISTANT

## 2024-08-28 RX ORDER — AMLODIPINE BESYLATE 5 MG/1
5 TABLET ORAL DAILY
Qty: 100 TABLET | Refills: 3 | Status: SHIPPED | OUTPATIENT
Start: 2024-08-28

## 2024-08-28 RX ORDER — SODIUM FLUORIDE 5 MG/G
PASTE, DENTIFRICE ORAL
COMMUNITY
Start: 2024-07-16

## 2024-08-28 NOTE — PROGRESS NOTES
Ambulatory Visit  Name: Ayad Peacock Jr.      : 1955      MRN: [id number]  Encounter Provider: Debi Rosenberg PA-C  Encounter Date: 2024   Encounter department: [facility]    Assessment & Plan      1. AWV - conducted    2. HTN - poor controlled on HCTZ 12.5 mg recently noticed, will add amlodipine 5 mg once daily, monitor BP at home for 2 months, send mychart reading with BP readings     3. Hyperlipidemia - well controlled with diet and exercise, monitor q 6 months     4. IFG - A1C 5.9%, continue with diet, exercise, repeat 6 months     5. Polyp of colon - colon due     6. Nasal skin lesion - not healing, refer to derm       F/u 6 months with labs or sooner if needed     Preventive health issues were discussed with patient, and age appropriate screening tests were ordered as noted in patient's After Visit Summary. Personalized health advice and appropriate referrals for health education or preventive services given if needed, as noted in patient's After Visit Summary.    History of Present Illness     Patient here with only concern of slowing healing skin lesion on nose. Constantly reopening and bleeding. Does not follow with derm.    Continues to eat healthy, exercise but also notes his BP running higher.        Patient Care Team:  Debi Rosenberg PA-C as PCP - General (Family Medicine)  Sherry Santiago MD as PCP - PCP-[facility] ([facility])    Review of Systems   Constitutional: Negative.    HENT: Negative.     Eyes: Negative.    Respiratory: Negative.     Cardiovascular: Negative.    Gastrointestinal: Negative.    Endocrine: Negative.    Genitourinary: Negative.    Musculoskeletal: Negative.    Skin: Negative.    Allergic/Immunologic: Negative.    Neurological: Negative.    Hematological: Negative.    Psychiatric/Behavioral: Negative.       Medical History Reviewed by provider this encounter:       Annual Wellness Visit Questionnaire    Ayad is here for his Subsequent Wellness visit.     Health Risk Assessment:   Patient rates overall health as excellent. Patient feels that their physical health rating is same. Patient is satisfied with their life. Eyesight was rated as same. Hearing was rated as same. Patient feels that their emotional and mental health rating is same. Patients states they are never, rarely angry. Patient states they are never, rarely unusually tired/fatigued. Pain experienced in the last 7 days has been none. Patient states that he has experienced no weight loss or gain in last 6 months.     Depression Screening:   PHQ-2 Score: 0      Fall Risk Screening:   In the past year, patient has experienced: no history of falling in past year      Home Safety:  Patient does not have trouble with stairs inside or outside of their home. Patient has working smoke alarms and has working carbon monoxide detector. Home safety hazards include: none.     Nutrition:   Current diet is Regular.     Medications:   Patient is currently taking over-the-counter supplements. OTC medications include: see medication list. Patient is able to manage medications.     Activities of Daily Living (ADLs)/Instrumental Activities of Daily Living (IADLs):   Walk and transfer into and out of bed and chair?: Yes  Dress and groom yourself?: Yes    Bathe or shower yourself?: Yes    Feed yourself? Yes  Do your laundry/housekeeping?: Yes  Manage your money, pay your bills and track your expenses?: Yes  Make your own meals?: Yes    Do your own shopping?: Yes    Previous Hospitalizations:   Any hospitalizations or ED visits within the last 12 months?: No      Advance Care Planning:   Living will: Yes    Advanced directive: Yes    End of Life Decisions reviewed with patient: Yes    Provider agrees with end of life decisions: Yes      Cognitive Screening:   Provider or family/friend/caregiver concerned regarding cognition?: No    PREVENTIVE SCREENINGS      Cardiovascular  Screening:    General: Screening Not Indicated and History Lipid Disorder      Diabetes Screening:     General: Screening Current      Colorectal Cancer Screening:     General: Screening Current      Prostate Cancer Screening:    General: Screening Current      Osteoporosis Screening:    General: Screening Not Indicated      Abdominal Aortic Aneurysm (AAA) Screening:    Risk factors include: age between 65-74 yo        General: Screening Not Indicated      Lung Cancer Screening:     General: Screening Not Indicated      Hepatitis C Screening:    General: Screening Current    Screening, Brief Intervention, and Referral to Treatment (SBIRT)    Screening  Typical number of drinks in a day: 0  Typical number of drinks in a week: 1  Interpretation: Low risk drinking behavior.    Single Item Drug Screening:  How often have you used an illegal drug (including marijuana) or a prescription medication for non-medical reasons in the past year? never    Single Item Drug Screen Score: 0  Interpretation: Negative screen for possible drug use disorder    Brief Intervention  Alcohol & drug use screenings were reviewed. No concerns regarding substance use disorder identified.     Other Counseling Topics:   Car/seat belt/driving safety, skin self-exam, sunscreen and calcium and vitamin D intake and regular weightbearing exercise.     Social Determinants of Health     Financial Resource Strain: Low Risk  (8/21/2023)    Overall Financial Resource Strain (CARDIA)     Difficulty of Paying Living Expenses: Not hard at all   Transportation Needs: No Transportation Needs (8/21/2023)    PRAPARE - Transportation     Lack of Transportation (Medical): No     Lack of Transportation (Non-Medical): No   Recent Concern: Transportation Needs - Unmet Transportation Needs (8/21/2023)    PRAPARE - Transportation     Lack of Transportation (Medical): No     Lack of Transportation (Non-Medical): Yes     No results found.    Objective     There were no  vitals taken for this visit.    Physical Exam  Constitutional:       Appearance: Normal appearance. He is well-developed and normal weight.   HENT:      Head: Normocephalic and atraumatic.      Right Ear: External ear normal.      Left Ear: External ear normal.   Eyes:      Extraocular Movements: Extraocular movements intact.      Conjunctiva/sclera: Conjunctivae normal.      Pupils: Pupils are equal, round, and reactive to light.   Neck:      Thyroid: No thyromegaly.   Cardiovascular:      Rate and Rhythm: Normal rate and regular rhythm.      Pulses: Normal pulses.      Heart sounds: Normal heart sounds. No murmur heard.  Pulmonary:      Effort: Pulmonary effort is normal.      Breath sounds: Normal breath sounds. No wheezing or rales.   Abdominal:      General: Abdomen is flat. Bowel sounds are normal.      Palpations: Abdomen is soft. There is no mass.      Tenderness: There is no abdominal tenderness. There is no rebound.   Musculoskeletal:         General: Normal range of motion.      Cervical back: Normal range of motion and neck supple.   Lymphadenopathy:      Cervical: No cervical adenopathy.   Skin:     General: Skin is warm.   Neurological:      General: No focal deficit present.      Mental Status: He is alert and oriented to person, place, and time.      Cranial Nerves: No cranial nerve deficit.      Deep Tendon Reflexes: Reflexes normal.   Psychiatric:         Mood and Affect: Mood normal.         Behavior: Behavior normal.         Thought Content: Thought content normal.         Judgment: Judgment normal.

## 2025-02-19 ENCOUNTER — APPOINTMENT (OUTPATIENT)
Dept: LAB | Facility: HOSPITAL | Age: 70
End: 2025-02-19
Payer: COMMERCIAL

## 2025-02-19 DIAGNOSIS — I10 ESSENTIAL HYPERTENSION: ICD-10-CM

## 2025-02-19 DIAGNOSIS — R53.83 FATIGUE, UNSPECIFIED TYPE: ICD-10-CM

## 2025-02-19 DIAGNOSIS — Z12.5 SCREENING PSA (PROSTATE SPECIFIC ANTIGEN): ICD-10-CM

## 2025-02-19 DIAGNOSIS — E78.49 OTHER HYPERLIPIDEMIA: ICD-10-CM

## 2025-02-19 DIAGNOSIS — R73.9 ELEVATED BLOOD SUGAR: ICD-10-CM

## 2025-02-19 LAB
ALBUMIN SERPL BCG-MCNC: 4.8 G/DL (ref 3.5–5)
ALP SERPL-CCNC: 88 U/L (ref 34–104)
ALT SERPL W P-5'-P-CCNC: 32 U/L (ref 7–52)
ANION GAP SERPL CALCULATED.3IONS-SCNC: 10 MMOL/L (ref 4–13)
AST SERPL W P-5'-P-CCNC: 24 U/L (ref 13–39)
BASOPHILS # BLD AUTO: 0.04 THOUSANDS/ΜL (ref 0–0.1)
BASOPHILS NFR BLD AUTO: 1 % (ref 0–1)
BILIRUB SERPL-MCNC: 1.34 MG/DL (ref 0.2–1)
BILIRUB UR QL STRIP: NEGATIVE
BUN SERPL-MCNC: 12 MG/DL (ref 5–25)
CALCIUM SERPL-MCNC: 9.7 MG/DL (ref 8.4–10.2)
CHLORIDE SERPL-SCNC: 97 MMOL/L (ref 96–108)
CHOLEST SERPL-MCNC: 203 MG/DL (ref ?–200)
CLARITY UR: CLEAR
CO2 SERPL-SCNC: 29 MMOL/L (ref 21–32)
COLOR UR: COLORLESS
CREAT SERPL-MCNC: 0.84 MG/DL (ref 0.6–1.3)
EOSINOPHIL # BLD AUTO: 0.05 THOUSAND/ΜL (ref 0–0.61)
EOSINOPHIL NFR BLD AUTO: 1 % (ref 0–6)
ERYTHROCYTE [DISTWIDTH] IN BLOOD BY AUTOMATED COUNT: 12.1 % (ref 11.6–15.1)
EST. AVERAGE GLUCOSE BLD GHB EST-MCNC: 128 MG/DL
GFR SERPL CREATININE-BSD FRML MDRD: 88 ML/MIN/1.73SQ M
GLUCOSE P FAST SERPL-MCNC: 139 MG/DL (ref 65–99)
GLUCOSE UR STRIP-MCNC: NEGATIVE MG/DL
HBA1C MFR BLD: 6.1 %
HCT VFR BLD AUTO: 46.7 % (ref 36.5–49.3)
HDLC SERPL-MCNC: 58 MG/DL
HGB BLD-MCNC: 15.3 G/DL (ref 12–17)
HGB UR QL STRIP.AUTO: NEGATIVE
IMM GRANULOCYTES # BLD AUTO: 0.01 THOUSAND/UL (ref 0–0.2)
IMM GRANULOCYTES NFR BLD AUTO: 0 % (ref 0–2)
KETONES UR STRIP-MCNC: NEGATIVE MG/DL
LDLC SERPL CALC-MCNC: 120 MG/DL (ref 0–100)
LEUKOCYTE ESTERASE UR QL STRIP: NEGATIVE
LYMPHOCYTES # BLD AUTO: 1.48 THOUSANDS/ΜL (ref 0.6–4.47)
LYMPHOCYTES NFR BLD AUTO: 30 % (ref 14–44)
MCH RBC QN AUTO: 31.1 PG (ref 26.8–34.3)
MCHC RBC AUTO-ENTMCNC: 32.8 G/DL (ref 31.4–37.4)
MCV RBC AUTO: 95 FL (ref 82–98)
MONOCYTES # BLD AUTO: 0.58 THOUSAND/ΜL (ref 0.17–1.22)
MONOCYTES NFR BLD AUTO: 12 % (ref 4–12)
NEUTROPHILS # BLD AUTO: 2.81 THOUSANDS/ΜL (ref 1.85–7.62)
NEUTS SEG NFR BLD AUTO: 56 % (ref 43–75)
NITRITE UR QL STRIP: NEGATIVE
NRBC BLD AUTO-RTO: 0 /100 WBCS
PH UR STRIP.AUTO: 7.5 [PH]
PLATELET # BLD AUTO: 221 THOUSANDS/UL (ref 149–390)
PMV BLD AUTO: 11.5 FL (ref 8.9–12.7)
POTASSIUM SERPL-SCNC: 4.4 MMOL/L (ref 3.5–5.3)
PROT SERPL-MCNC: 8 G/DL (ref 6.4–8.4)
PROT UR STRIP-MCNC: NEGATIVE MG/DL
PSA SERPL-MCNC: 0.85 NG/ML (ref 0–4)
RBC # BLD AUTO: 4.92 MILLION/UL (ref 3.88–5.62)
SODIUM SERPL-SCNC: 136 MMOL/L (ref 135–147)
SP GR UR STRIP.AUTO: 1.01 (ref 1–1.03)
TRIGL SERPL-MCNC: 123 MG/DL (ref ?–150)
UROBILINOGEN UR STRIP-ACNC: <2 MG/DL
WBC # BLD AUTO: 4.97 THOUSAND/UL (ref 4.31–10.16)

## 2025-02-19 PROCEDURE — G0103 PSA SCREENING: HCPCS

## 2025-02-19 PROCEDURE — 80053 COMPREHEN METABOLIC PANEL: CPT

## 2025-02-19 PROCEDURE — 36415 COLL VENOUS BLD VENIPUNCTURE: CPT

## 2025-02-19 PROCEDURE — 85025 COMPLETE CBC W/AUTO DIFF WBC: CPT

## 2025-02-19 PROCEDURE — 81003 URINALYSIS AUTO W/O SCOPE: CPT

## 2025-02-19 PROCEDURE — 80061 LIPID PANEL: CPT

## 2025-02-19 PROCEDURE — 83036 HEMOGLOBIN GLYCOSYLATED A1C: CPT

## 2025-02-23 ENCOUNTER — RA CDI HCC (OUTPATIENT)
Dept: OTHER | Facility: HOSPITAL | Age: 70
End: 2025-02-23

## 2025-02-28 ENCOUNTER — OFFICE VISIT (OUTPATIENT)
Dept: FAMILY MEDICINE CLINIC | Facility: CLINIC | Age: 70
End: 2025-02-28
Payer: COMMERCIAL

## 2025-02-28 VITALS
OXYGEN SATURATION: 98 % | HEART RATE: 86 BPM | WEIGHT: 165 LBS | SYSTOLIC BLOOD PRESSURE: 168 MMHG | BODY MASS INDEX: 25.9 KG/M2 | TEMPERATURE: 98.6 F | RESPIRATION RATE: 16 BRPM | HEIGHT: 67 IN | DIASTOLIC BLOOD PRESSURE: 78 MMHG

## 2025-02-28 DIAGNOSIS — E78.49 OTHER HYPERLIPIDEMIA: ICD-10-CM

## 2025-02-28 DIAGNOSIS — R73.9 ELEVATED BLOOD SUGAR: ICD-10-CM

## 2025-02-28 DIAGNOSIS — K63.5 POLYP OF COLON, UNSPECIFIED PART OF COLON, UNSPECIFIED TYPE: ICD-10-CM

## 2025-02-28 DIAGNOSIS — R01.1 HEART MURMUR: ICD-10-CM

## 2025-02-28 DIAGNOSIS — T46.4X5A ANGIOTENSIN CONVERTING ENZYME INHIBITOR (ACE-I) INDUCED ANGIOEDEMA OF INTESTINE: ICD-10-CM

## 2025-02-28 DIAGNOSIS — I10 ESSENTIAL HYPERTENSION: Primary | ICD-10-CM

## 2025-02-28 DIAGNOSIS — T78.3XXA ANGIOTENSIN CONVERTING ENZYME INHIBITOR (ACE-I) INDUCED ANGIOEDEMA OF INTESTINE: ICD-10-CM

## 2025-02-28 PROCEDURE — 99214 OFFICE O/P EST MOD 30 MIN: CPT | Performed by: PHYSICIAN ASSISTANT

## 2025-02-28 PROCEDURE — G2211 COMPLEX E/M VISIT ADD ON: HCPCS | Performed by: PHYSICIAN ASSISTANT

## 2025-02-28 NOTE — ASSESSMENT & PLAN NOTE
Well controlled on diet and exercise  Orders:    Lipid Panel with Direct LDL reflex; Future    Comprehensive metabolic panel; Future

## 2025-02-28 NOTE — ASSESSMENT & PLAN NOTE
Well controlled on diet and exercise  Orders:    Hemoglobin A1C; Future    Comprehensive metabolic panel; Future

## 2025-02-28 NOTE — PROGRESS NOTES
Name: Ayad Peacock Jr.      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    Admits norvasc has helped the aches in his hands, brought BP for review. Staying in 110-130/70-80. Not biking now, eating more cheese. Will get back in track as it warms up.    Hypertension  Pertinent negatives include no chest pain, palpitations or shortness of breath.     Review of Systems   Constitutional:  Negative for chills and fever.   HENT:  Negative for ear pain and sore throat.    Eyes:  Negative for pain and visual disturbance.   Respiratory:  Negative for cough and shortness of breath.    Cardiovascular:  Negative for chest pain and palpitations.   Gastrointestinal:  Negative for abdominal pain and vomiting.   Genitourinary:  Negative for dysuria and hematuria.   Musculoskeletal:  Negative for arthralgias and back pain.   Skin:  Negative for color change and rash.   Neurological:  Negative for seizures and syncope.   All other systems reviewed and are negative.      Objective   /78   Pulse 86   Temp 98.6  "°F (37 °C) (Temporal)   Resp 16   Ht 5' 6.5\" (1.689 m)   Wt 74.8 kg (165 lb)   SpO2 98%   BMI 26.23 kg/m²      Physical Exam  Constitutional:       Appearance: Normal appearance. He is well-developed.   HENT:      Head: Normocephalic and atraumatic.   Cardiovascular:      Rate and Rhythm: Normal rate and regular rhythm.      Pulses: Normal pulses.      Heart sounds: Murmur heard.   Pulmonary:      Effort: Pulmonary effort is normal.      Breath sounds: Normal breath sounds.   Musculoskeletal:         General: Normal range of motion.      Cervical back: Normal range of motion and neck supple.   Skin:     General: Skin is warm.   Neurological:      General: No focal deficit present.      Mental Status: He is alert and oriented to person, place, and time.   Psychiatric:         Mood and Affect: Mood normal.         Behavior: Behavior normal.         Thought Content: Thought content normal.         Judgment: Judgment normal.         "

## 2025-05-14 ENCOUNTER — HOSPITAL ENCOUNTER (OUTPATIENT)
Dept: NON INVASIVE DIAGNOSTICS | Age: 70
Discharge: HOME | End: 2025-05-14
Attending: PHYSICIAN ASSISTANT
Payer: COMMERCIAL

## 2025-05-14 VITALS
BODY MASS INDEX: 25.9 KG/M2 | HEART RATE: 91 BPM | HEIGHT: 67 IN | DIASTOLIC BLOOD PRESSURE: 78 MMHG | SYSTOLIC BLOOD PRESSURE: 168 MMHG | WEIGHT: 165 LBS

## 2025-05-14 DIAGNOSIS — R01.1 HEART MURMUR: ICD-10-CM

## 2025-05-14 LAB
AORTIC ROOT: 3.2 CM
AORTIC VALVE MEAN VELOCITY: 12.5 M/S
AV AREA BY CONTINUOUS VTI: 1.6 CM2
AV AREA PEAK VELOCITY: 1.4 CM2
AV LVOT MEAN GRADIENT: 2 MMHG
AV LVOT PEAK GRADIENT: 3 MMHG
AV MEAN PRESS GRAD SYS DOP V1V2: 7 MMHG
AV ORIFICE AREA US: 1.63 CM2
AV PEAK GRADIENT: 15 MMHG
AV VELOCITY RATIO: 0.52
AV VMAX SYS DOP: 1.95 M/S
BSA FOR ECHO PROCEDURE: 1.85 M2
DOP CALC AO VTI: 33.77 CM
DOP CALC LVOT AREA: 3.14 CM2
DOP CALC LVOT CARDIAC INDEX: 2.51 L/MIN/M2
DOP CALC LVOT CARDIAC OUTPUT: 4.65 L/MIN
DOP CALC LVOT DIAMETER: 2 CM
DOP CALC LVOT PEAK VEL VTI: 17.52 CM
DOP CALC LVOT PEAK VEL: 0.87 M/S
DOP CALC LVOT STROKE INDEX: 30.3 ML/M2
DOP CALC LVOT STROKE VOLUME: 55.01
DOP CALC MV VTI: 17.69 CM
E WAVE DECELERATION TIME: 126 MS
E/A RATIO: 0.54
FRACTIONAL SHORTENING: 36 (ref 28–44)
INTERVENTRICULAR SEPTUM IN DIASTOLE (PARASTERNAL SHORT AXIS VIEW): 1.3 CM
INTERVENTRICULAR SEPTUM: 1.3 CM (ref 0.6–1.1)
LAAS-AP2: 15.9 CM2
LAAS-AP4: 13.3 CM2
LEFT ATRIUM SIZE: 3.5 CM
LEFT ATRIUM VOLUME (MOD BIPLANE): 40 ML
LEFT ATRIUM VOLUME INDEX (MOD BIPLANE): 21.6 ML/M2
LEFT INTERNAL DIMENSION IN SYSTOLE: 2.7 CM (ref 2.1–4)
LEFT VENTRICLE DIASTOLIC VOLUME (MOD BIPLANE): 107 ML
LEFT VENTRICLE DIASTOLIC VOLUME INDEX (MOD BIPLANE): 57.8 ML/M2
LEFT VENTRICLE SYSTOLIC VOLUME (MOD BIPLANE): 32 ML
LEFT VENTRICLE SYSTOLIC VOLUME INDEX (MOD BIPLANE): 17.3 ML/M2
LEFT VENTRICULAR INTERNAL DIMENSION IN DIASTOLE: 4.2 CM (ref 3.5–6)
LEFT VENTRICULAR POSTERIOR WALL IN END DIASTOLE: 1.1 CM
LEFT VENTRICULAR STROKE VOLUME: 50 ML
LV EF BIPLANE MOD: 70 %
LV EF US.2D.A4C+ESTIMATED: 61 %
LVSV (TEICH): 50 ML
MV E'TISSUE VEL-LAT: 12 CM/S
MV E'TISSUE VEL-SEP: 8 CM/S
MV MEAN GRADIENT: 2 MMHG
MV PEAK A VEL: 1.03 M/S
MV PEAK E VEL: 56 CM/S
MV PEAK GRADIENT: 6 MMHG
MV STENOSIS PRESSURE HALF TIME: 36 MS
MV VALVE AREA BY CONTINUITY EQUATION: 3.11 CM2
MV VALVE AREA P 1/2 METHOD: 6.11
RIGHT ATRIAL 2D VOLUME: 36 ML
RIGHT ATRIUM AREA SYSTOLE A4C: 14.4 CM2
RIGHT VENTRICLE ID DIMENSION: 3.9 CM
SL CV LEFT ATRIUM LENGTH A2C: 4.6 CM
SL CV LV EF: 65
SL CV PED ECHO LEFT VENTRICLE DIASTOLIC VOLUME (MOD BIPLANE) 2D: 77 ML
SL CV PED ECHO LEFT VENTRICLE SYSTOLIC VOLUME (MOD BIPLANE) 2D: 27 ML
TRICUSPID ANNULAR PLANE SYSTOLIC EXCURSION: 2.3 CM

## 2025-05-14 PROCEDURE — 93306 TTE W/DOPPLER COMPLETE: CPT

## 2025-05-14 PROCEDURE — 93306 TTE W/DOPPLER COMPLETE: CPT | Performed by: INTERNAL MEDICINE

## 2025-05-16 DIAGNOSIS — I10 ESSENTIAL HYPERTENSION: ICD-10-CM

## 2025-05-16 RX ORDER — HYDROCHLOROTHIAZIDE 12.5 MG/1
12.5 TABLET ORAL DAILY
Qty: 90 TABLET | Refills: 1 | Status: SHIPPED | OUTPATIENT
Start: 2025-05-16

## 2025-05-20 ENCOUNTER — RESULTS FOLLOW-UP (OUTPATIENT)
Dept: FAMILY MEDICINE CLINIC | Facility: CLINIC | Age: 70
End: 2025-05-20

## 2025-05-20 NOTE — RESULT ENCOUNTER NOTE
Call patient regarding his echo  Murmur was because of calcifications on his valve, he is finding nothing more needs to be done

## 2025-05-21 NOTE — TELEPHONE ENCOUNTER
----- Message from Nicki Graham DO sent at 5/20/2025  4:22 PM EDT -----  Call patient regarding his echo  Murmur was because of calcifications on his valve, he is finding nothing more needs to be done  ----- Message -----  From: Andrews Ambriz MD  Sent: 5/14/2025   4:27 PM EDT  To: Debi Rosenberg PA-C